# Patient Record
Sex: FEMALE | Race: WHITE | NOT HISPANIC OR LATINO | Employment: UNEMPLOYED | URBAN - METROPOLITAN AREA
[De-identification: names, ages, dates, MRNs, and addresses within clinical notes are randomized per-mention and may not be internally consistent; named-entity substitution may affect disease eponyms.]

---

## 2018-04-12 ENCOUNTER — CONVERSION ENCOUNTER (OUTPATIENT)
Dept: SURGERY | Facility: CLINIC | Age: 23
End: 2018-04-12

## 2018-04-12 ENCOUNTER — OFFICE VISIT CONVERTED (OUTPATIENT)
Dept: SURGERY | Facility: CLINIC | Age: 23
End: 2018-04-12
Attending: SURGERY

## 2019-05-15 ENCOUNTER — HOSPITAL ENCOUNTER (OUTPATIENT)
Dept: URGENT CARE | Facility: CLINIC | Age: 24
Discharge: HOME | End: 2019-05-15
Attending: NURSE PRACTITIONER

## 2019-05-18 LAB — BACTERIA SPEC AEROBE CULT: NORMAL

## 2020-06-01 ENCOUNTER — HOSPITAL ENCOUNTER (OUTPATIENT)
Dept: OTHER | Facility: HOSPITAL | Age: 25
Discharge: HOME | End: 2020-06-01
Attending: FAMILY MEDICINE

## 2020-06-02 LAB
VZV IGG SER IA-ACNC: 602 INDEX
VZV IGM SER IA-ACNC: <0.91 INDEX (ref 0–0.9)

## 2021-01-18 ENCOUNTER — HOSPITAL ENCOUNTER (OUTPATIENT)
Dept: OTHER | Facility: HOSPITAL | Age: 26
Discharge: HOME | End: 2021-01-18
Attending: INTERNAL MEDICINE

## 2021-02-12 ENCOUNTER — HOSPITAL ENCOUNTER (OUTPATIENT)
Dept: VACCINE CLINIC | Facility: HOSPITAL | Age: 26
Discharge: HOME | End: 2021-02-12
Attending: INTERNAL MEDICINE

## 2021-05-16 VITALS — WEIGHT: 158.5 LBS | RESPIRATION RATE: 14 BRPM | HEIGHT: 64 IN | BODY MASS INDEX: 27.06 KG/M2

## 2022-03-14 ENCOUNTER — OFFICE VISIT (OUTPATIENT)
Dept: OBSTETRICS AND GYNECOLOGY | Facility: CLINIC | Age: 27
End: 2022-03-14

## 2022-03-14 VITALS
HEART RATE: 76 BPM | BODY MASS INDEX: 36.8 KG/M2 | WEIGHT: 200 LBS | HEIGHT: 62 IN | SYSTOLIC BLOOD PRESSURE: 136 MMHG | DIASTOLIC BLOOD PRESSURE: 80 MMHG

## 2022-03-14 DIAGNOSIS — R10.2 PELVIC PAIN IN FEMALE: Primary | ICD-10-CM

## 2022-03-14 PROCEDURE — 99213 OFFICE O/P EST LOW 20 MIN: CPT | Performed by: OBSTETRICS & GYNECOLOGY

## 2022-03-14 PROCEDURE — G0123 SCREEN CERV/VAG THIN LAYER: HCPCS | Performed by: OBSTETRICS & GYNECOLOGY

## 2022-03-14 NOTE — PROGRESS NOTES
"GYN Problem/Follow Up Visit    Chief Complaint   Patient presents with   • Discuss IUD removel           HPI  Sherri Clifford is a 27 y.o. female, No obstetric history on file., who presents for pelvic pain. States had mirena inserted about four years ago and has had some pain/cramping since then but it has worsened over the past year. C/o bad cramps in mid pelvis. Has irregular menses with mirena. Denies vaginal sx. Also c/o weight gain. Considering iud removal but getting  in October and unsure if she should wait to remove it.        Additional OB/GYN History   No LMP recorded. Patient has had an implant.  Current contraception: contraceptive methods: IUD.  Insertion date: 4 years ago  Allergies : Patient has no known allergies.     The additional following portions of the patient's history were reviewed and updated as appropriate: allergies, current medications, past family history, past medical history, past social history, past surgical history and problem list.    Review of Systems    I have reviewed and agree with the HPI, ROS, and historical information as entered above. Wendy Nelson, APRN    Objective   /80   Pulse 76   Ht 157.5 cm (62\")   Wt 90.7 kg (200 lb)   BMI 36.58 kg/m²     Physical Exam  Vitals reviewed.   Genitourinary:     General: Normal vulva.      Vagina: Normal.      Cervix: Normal.      Uterus: Normal. Not tender.       Adnexa: Right adnexa normal and left adnexa normal.        Right: No tenderness.          Left: No tenderness.     Skin:     General: Skin is warm and dry.   Neurological:      Mental Status: She is alert and oriented to person, place, and time.            Assessment and Plan    Diagnoses and all orders for this visit:    1. Pelvic pain in female (Primary)  -     US Pelvis Transvaginal Non OB; Future  -     IGP,rfx Aptima HPV All Pth    will check pap and pelvic u/s. Discussed checking hormone levels but we will see what u/s shows first. Declines iud " removal today. Will consider options and f/u after u/s.     Counseling:  She understands the importance of having the above orders performed in a timely fashion.  She is encouraged to review her results online and/or contact or office if she has questions.     Follow Up:  Return for u/s f/u.      Wendy Nelson, KECIA  03/14/2022

## 2022-03-18 LAB
CONV .: NORMAL
CYTOLOGIST CVX/VAG CYTO: NORMAL
CYTOLOGY CVX/VAG DOC CYTO: NORMAL
CYTOLOGY CVX/VAG DOC THIN PREP: NORMAL
DX ICD CODE: NORMAL
HIV 1 & 2 AB SER-IMP: NORMAL
OTHER STN SPEC: NORMAL
STAT OF ADQ CVX/VAG CYTO-IMP: NORMAL

## 2022-03-29 ENCOUNTER — HOSPITAL ENCOUNTER (OUTPATIENT)
Dept: ULTRASOUND IMAGING | Facility: HOSPITAL | Age: 27
Discharge: HOME | End: 2022-03-29
Admitting: OBSTETRICS & GYNECOLOGY

## 2022-03-29 DIAGNOSIS — R10.2 PELVIC PAIN IN FEMALE: ICD-10-CM

## 2022-03-29 PROCEDURE — 76856 US EXAM PELVIC COMPLETE: CPT

## 2022-03-29 PROCEDURE — 76830 TRANSVAGINAL US NON-OB: CPT

## 2022-04-04 ENCOUNTER — OFFICE VISIT (OUTPATIENT)
Dept: OBSTETRICS AND GYNECOLOGY | Facility: CLINIC | Age: 27
End: 2022-04-04

## 2022-04-04 VITALS
WEIGHT: 206 LBS | HEART RATE: 82 BPM | BODY MASS INDEX: 37.68 KG/M2 | SYSTOLIC BLOOD PRESSURE: 140 MMHG | DIASTOLIC BLOOD PRESSURE: 84 MMHG

## 2022-04-04 DIAGNOSIS — R10.2 PELVIC PAIN IN FEMALE: Primary | ICD-10-CM

## 2022-04-04 DIAGNOSIS — N83.202 LEFT OVARIAN CYST: ICD-10-CM

## 2022-04-04 PROCEDURE — 99213 OFFICE O/P EST LOW 20 MIN: CPT | Performed by: OBSTETRICS & GYNECOLOGY

## 2022-04-04 NOTE — PROGRESS NOTES
GYN Problem/Follow Up Visit    Chief Complaint   Patient presents with   • Follow-up     U/s           HPI  Sherri Cilfford is a 27 y.o. female, No obstetric history on file., who presents for u/s f/u. Seen in office 3/14/22 with c/o pelvic pain. Has had mirena for four years. irreg menses.        Additional OB/GYN History   Patient's last menstrual period was 04/04/2022.  Current contraception: contraceptive methods: IUD.  Insertion date: 4 years ago  Desires to: continue contraception  Allergies : Patient has no known allergies.     The additional following portions of the patient's history were reviewed and updated as appropriate: allergies, current medications, past family history, past medical history, past social history, past surgical history and problem list.    Review of Systems    I have reviewed and agree with the HPI, ROS, and historical information as entered above. Wendy Nelson, APRN    Objective   /84   Pulse 82   Wt 93.4 kg (206 lb)   LMP 04/04/2022   BMI 37.68 kg/m²     Physical Exam  Vitals reviewed.   Neurological:      Mental Status: She is alert and oriented to person, place, and time.            Assessment and Plan    Diagnoses and all orders for this visit:    1. Pelvic pain in female (Primary)  -     US Pelvis Transvaginal Non OB; Future    2. Left ovarian cyst  -     US Pelvis Transvaginal Non OB; Future    reviewed pap done 3/14/22: normal. Reviewed pelvic u/s done 3/29/22: stripe 5 mm, iud in place, 2.3 cm possible hemorrhagic left ovarian cyst. Discussed options such as removing mirena to see if that helps her pain. Pt states has always had painful menses and declines iud removal today. Getting  in October. Will monitor sx. Will recheck pelvic u/s in two months.     Counseling:  She understands the importance of having the above orders performed in a timely fashion.  She is encouraged to review her results online and/or contact or office if she has questions.      Follow Up:  Return for u/s f/u-phone appt okay.      Wendy Nelson, APRN  04/04/2022

## 2022-04-26 ENCOUNTER — TELEPHONE (OUTPATIENT)
Dept: OBSTETRICS AND GYNECOLOGY | Facility: CLINIC | Age: 27
End: 2022-04-26

## 2022-04-26 NOTE — TELEPHONE ENCOUNTER
Please unable to repeat the ultrasound at this time due to the cost. She is requesting to speak with you.

## 2022-04-26 NOTE — TELEPHONE ENCOUNTER
----- Message from Sherri Clifford sent at 4/26/2022 12:32 PM EDT -----  Regarding: Ovarian Cyst   I would like to cancel my upcoming ultrasound appointment due to the high bill that follows. I would like to meet with you or a doctor to discuss surgery and remove the lesion completely as this will most likely be my decision in the end regardless. Please give me a call at your earliest convenience.   Thank you   Sherri

## 2022-06-03 ENCOUNTER — OFFICE VISIT (OUTPATIENT)
Dept: OBSTETRICS AND GYNECOLOGY | Facility: CLINIC | Age: 27
End: 2022-06-03

## 2022-06-03 VITALS
HEIGHT: 62 IN | DIASTOLIC BLOOD PRESSURE: 92 MMHG | BODY MASS INDEX: 36.44 KG/M2 | WEIGHT: 198 LBS | SYSTOLIC BLOOD PRESSURE: 141 MMHG | HEART RATE: 86 BPM

## 2022-06-03 DIAGNOSIS — F41.9 ANXIETY: ICD-10-CM

## 2022-06-03 DIAGNOSIS — N83.202 LEFT OVARIAN CYST: Primary | ICD-10-CM

## 2022-06-03 PROBLEM — N83.209 OVARIAN CYST: Status: ACTIVE | Noted: 2022-06-03

## 2022-06-03 PROCEDURE — 99213 OFFICE O/P EST LOW 20 MIN: CPT | Performed by: STUDENT IN AN ORGANIZED HEALTH CARE EDUCATION/TRAINING PROGRAM

## 2022-06-03 RX ORDER — LEVONORGESTREL 52 MG/1
1 INTRAUTERINE DEVICE INTRAUTERINE
COMMUNITY
End: 2022-11-28

## 2022-06-03 NOTE — PROGRESS NOTES
GYN Problem/Follow Up Visit    Chief Complaint   Patient presents with   • Follow-up     FU from  for Plv Pain and Ovarian Cyst            HPI  Sherri Clifford is a 27 y.o. female, , who presents for ovarian cyst. Feeling crampy and diana. Had an IUD placed four years ago. Reports that periods have been sporadic and irregular. 5-6 day period and then she can have spotting after a week or so afterwards. This has been long this with the IUD. She is a nonsmoker. She uses tylenol/ibuprofen and heating pad to help with discomfort. She reports that it usually does not help. Denies any pain with urination. She reports irregular bowel movements since she had her GB taken out. She has not seen a GI for this discomfort. She has not had any other abdominal surgeries. She has never been pregnant. No vaginal discharge, no odor. She is sexually active no pain with intercourse.  She does have some discomfort with bowel movements.  She reports that she is not sleeping very well, having difficulty with concentration, still finds shiv in life.  No desire to hurt self or hurt others.  Had been on Zoloft in the past when she was 18 has not seen by for this in a while.  Her primary care physician has retired.  She does not have a primary care physician.  She is in the process of planning a wedding    Additional OB/GYN History   No LMP recorded. Patient has had an implant.  Current contraception: contraceptive methods: IUD.  Insertion date:   Desires to: continue contraception  Allergies : Patient has no known allergies.     The additional following portions of the patient's history were reviewed and updated as appropriate: allergies, current medications, past family history, past medical history, past social history, past surgical history and problem list.    Review of Systems   Constitutional: Positive for fatigue and unexpected weight gain. Negative for fever.   Respiratory: Negative for cough and shortness of breath.   "  Cardiovascular: Negative for chest pain.   Gastrointestinal: Negative for abdominal distention and abdominal pain.   Genitourinary: Positive for menstrual problem and pelvic pain. Negative for decreased urine volume, difficulty urinating, dyspareunia, dysuria, urgency, urinary incontinence, vaginal discharge and vaginal pain.   Psychiatric/Behavioral: Positive for behavioral problems, decreased concentration and depressed mood. The patient is nervous/anxious.        I have reviewed and agree with the HPI, ROS, and historical information as entered above. Adonis Silva MD    Objective   /92   Pulse 86   Ht 157.5 cm (62\")   Wt 89.8 kg (198 lb)   Breastfeeding No   BMI 36.21 kg/m²     Physical Exam  Vitals and nursing note reviewed.   Constitutional:       General: She is not in acute distress.     Appearance: Normal appearance. She is not toxic-appearing.   HENT:      Head: Normocephalic and atraumatic.   Eyes:      Extraocular Movements: Extraocular movements intact.      Conjunctiva/sclera: Conjunctivae normal.   Cardiovascular:      Pulses: Normal pulses.   Pulmonary:      Effort: Pulmonary effort is normal.   Abdominal:      General: There is no distension.      Palpations: Abdomen is soft.      Tenderness: There is no abdominal tenderness.   Musculoskeletal:      Cervical back: Normal range of motion.   Skin:     General: Skin is warm and dry.   Neurological:      Mental Status: She is alert and oriented to person, place, and time.   Psychiatric:         Mood and Affect: Mood normal.         Behavior: Behavior normal.         Thought Content: Thought content normal.            Assessment and Plan  Sherri Clifford is a 27 y.o.  presenting for follow-up for ongoing pelvic discomforts.  Review of transvaginal ultrasound performed in March showing normal-appearing uterus with IUD in place.  There appeared to be a small hemorrhagic cyst on the left side.  Right ovary appears normal.  " Discussed with patient possibility that this began a small endometrioma versus a hemorrhagic cyst with recommendation for follow-up ultrasound 6 to 8 weeks following that previous scan.  Also discussed stress is a cause of abnormalities in menses regulation.  Recommendation for evaluation by primary care physician for possible needs of anxiolytic, depressive medication.  Patient also with elevated blood pressures noted since July of last year likely needs to be on an agent.    Diagnoses and all orders for this visit:    1. Left ovarian cyst (Primary)  -     US Non-ob Transvaginal; Future    2. Anxiety  -     Ambulatory Referral to Family Practice          She understands the importance of having the above orders performed in a timely fashion.  The risks of not performing them include, but are not limited to, cancer and/or subsequent increase in morbidity and/or mortality.  She is encouraged to review her results online and/or contact or office if she has questions.     Follow Up:  Return in about 6 weeks (around 7/15/2022) for Next scheduled follow up.        Adonis Silva MD  06/03/2022

## 2022-06-06 ENCOUNTER — APPOINTMENT (OUTPATIENT)
Dept: ULTRASOUND IMAGING | Facility: HOSPITAL | Age: 27
End: 2022-06-06

## 2022-06-14 NOTE — PROGRESS NOTES
"CHIEF COMPLAINT  Sherri Clifford presents to [facility] to Establish Care (Pt would like to establish care ) and Hypertension     HPI  Pt is here to establish care-concerned about BP-dx-2-3 yrs ago.Occ HA-1x/week-for yrs-FH DM    Broke her toe 3 days ago -after knocked down by her black lab-    HTN--not controlled-no meds-lost 10 lbs past 2 weeks  Getting  in 10/2022    Past History:  Allergies: Patient has no known allergies.   Medical History: has a past medical history of Fracture of foot and Hypertension.   Surgical History: has a past surgical history that includes Cholecystectomy.   Family History: family history includes Breast cancer (age of onset: 38) in her paternal aunt; Diabetes in her maternal grandmother; Heart disease in her paternal grandmother; Stroke in her maternal grandfather and paternal grandmother.   Social History: reports that she has never smoked. She has never used smokeless tobacco. She reports current alcohol use of about 2.0 standard drinks of alcohol per week. She reports that she does not use drugs.  Social History     Social History Narrative   • Not on file     SH-works at [employer]-drinks alcohol socially, no cigarette use    OBJECTIVE  Vital Signs  Vitals:    06/21/22 1408 06/21/22 1430   BP: 135/93 138/98   BP Location: Right arm Left arm   Patient Position: Sitting Sitting   Cuff Size: Adult Adult   Pulse: 77    Resp: 12    Temp: 97.2 °F (36.2 °C)    SpO2: 98%    Weight: 88.9 kg (196 lb)    Height: 160 cm (63\")       Body mass index is 34.72 kg/m².    Review of Systems -significant for occasional headaches, left great toe fracture    Physical Exam  Vitals and nursing note reviewed.   Constitutional:       Appearance: Normal appearance.   HENT:      Head: Normocephalic and atraumatic.   Cardiovascular:      Rate and Rhythm: Normal rate and regular rhythm.   Pulmonary:      Effort: Pulmonary effort is normal.      Breath sounds: " Normal breath sounds.   Abdominal:      Palpations: Abdomen is soft.   Musculoskeletal:      Cervical back: Normal range of motion and neck supple.   Neurological:      General: No focal deficit present.      Mental Status: She is alert and oriented to person, place, and time.         RESULTS REVIEW  No results found for: PROBNP, BNP          No results found for: TSH   No results found for: FREET4         US Pelvis Transvaginal Non OB    Result Date: 3/29/2022    1. 2.3 centimeter cystic lesion in the left ovary favored to reflect small hemorrhagic cyst.  Endometrioma or other process less likely.  Follow-up could be considered in 12 weeks to ensure resolution. 2. Small amount of fluid adjacent to the right ovary may be physiologic free fluid. 3. IUD in expected position.  Normal endometrial thickness.     ESTELITA MCGUIRE MD       Electronically Signed and Approved By: ESTELITA MCGUIRE MD on 3/29/2022 at 14:40                         ASSESSMENT & PLAN  Diagnoses and all orders for this visit:    1. Primary hypertension (Primary)  Assessment & Plan:  Not controlled discussed hypertension and complications.  Start amlodipine 2.5 mg daily monitor for any swelling of the legs.  Information regarding the DASH diet and hypertension given to patient.  Monitor blood pressure twice a day 5 days a week at home and record bring in log at next visit.    Orders:  -     Vitamin B12; Future  -     Folate; Future  -     CBC w AUTO Differential; Future  -     Comprehensive metabolic panel; Future  -     Lipid panel; Future  -     TSH+Free T4; Future  -     Vitamin D 25 hydroxy; Future  -     Hepatitis C antibody; Future  -     amLODIPine (NORVASC) 2.5 MG tablet; Take 1 tablet by mouth Daily.  Dispense: 30 tablet; Refill: 2    2. Closed nondisplaced fracture of phalanx of left great toe, unspecified phalanx, initial encounter  Comments:  just saw Dr Hendricks-wearing boot--f/u with him as directed 4-6 weeks      Patient  Instructions   Start amlodipine 2.5 mg daily  Do labs  Information on Mediterranean diet and DASH diet given to patient and hypertension management.     FOLLOW UP  Return in about 2 months (around 8/21/2022).     Addendum 6/29/2022  Vitamin D level low start high-dose vitamin D for 2 months then vitamin D 2000 units daily      Patient was given instructions and counseling regarding her condition or for health maintenance advice. Please see specific information pulled into the AVS if appropriate.

## 2022-06-20 ENCOUNTER — HOSPITAL ENCOUNTER (EMERGENCY)
Facility: HOSPITAL | Age: 27
Discharge: HOME | End: 2022-06-20
Attending: EMERGENCY MEDICINE | Admitting: EMERGENCY MEDICINE

## 2022-06-20 ENCOUNTER — APPOINTMENT (OUTPATIENT)
Dept: GENERAL RADIOLOGY | Facility: HOSPITAL | Age: 27
End: 2022-06-20

## 2022-06-20 VITALS
OXYGEN SATURATION: 100 % | DIASTOLIC BLOOD PRESSURE: 82 MMHG | HEIGHT: 63 IN | SYSTOLIC BLOOD PRESSURE: 149 MMHG | RESPIRATION RATE: 16 BRPM | HEART RATE: 92 BPM | TEMPERATURE: 98.2 F | WEIGHT: 196.87 LBS | BODY MASS INDEX: 34.88 KG/M2

## 2022-06-20 DIAGNOSIS — S92.415A CLOSED NONDISPLACED FRACTURE OF PROXIMAL PHALANX OF LEFT GREAT TOE, INITIAL ENCOUNTER: Primary | ICD-10-CM

## 2022-06-20 PROCEDURE — 73630 X-RAY EXAM OF FOOT: CPT

## 2022-06-20 PROCEDURE — 99283 EMERGENCY DEPT VISIT LOW MDM: CPT

## 2022-06-20 PROCEDURE — 96372 THER/PROPH/DIAG INJ SC/IM: CPT

## 2022-06-20 PROCEDURE — 25010000002 KETOROLAC TROMETHAMINE PER 15 MG: Performed by: NURSE PRACTITIONER

## 2022-06-20 RX ORDER — KETOROLAC TROMETHAMINE 30 MG/ML
30 INJECTION, SOLUTION INTRAMUSCULAR; INTRAVENOUS ONCE
Status: COMPLETED | OUTPATIENT
Start: 2022-06-20 | End: 2022-06-20

## 2022-06-20 RX ORDER — KETOROLAC TROMETHAMINE 10 MG/1
10 TABLET, FILM COATED ORAL EVERY 6 HOURS PRN
Qty: 20 TABLET | Refills: 0 | OUTPATIENT
Start: 2022-06-20 | End: 2022-08-14

## 2022-06-20 RX ADMIN — KETOROLAC TROMETHAMINE 30 MG: 30 INJECTION, SOLUTION INTRAMUSCULAR; INTRAVENOUS at 15:27

## 2022-06-20 NOTE — ED PROVIDER NOTES
Subjective   Patient presents to the emergency department today due to pain, bruising, swelling of her left great toe.  She says that her dog tripped her last night causing her to injure her toe.  He also reports that she has had an injury to that toe in the past.      History provided by:  Patient   used: No        Review of Systems   Constitutional: Negative for chills and fever.   HENT: Negative for congestion, ear pain, rhinorrhea and sore throat.    Eyes: Negative for pain.   Respiratory: Negative for cough and shortness of breath.    Cardiovascular: Negative for chest pain.   Gastrointestinal: Negative for abdominal pain, diarrhea, nausea and vomiting.   Genitourinary: Negative for decreased urine volume, dysuria and flank pain.   Musculoskeletal: Positive for arthralgias (Left great toe), gait problem and joint swelling (Left great toe). Negative for myalgias.   Skin: Negative for rash.   Neurological: Negative for seizures and headaches.   All other systems reviewed and are negative.      Past Medical History:   Diagnosis Date   • Fracture of foot     left foot   • Hypertension        No Known Allergies    Past Surgical History:   Procedure Laterality Date   • CHOLECYSTECTOMY         Family History   Problem Relation Age of Onset   • Breast cancer Paternal Aunt 38   • Diabetes Maternal Grandmother    • Stroke Maternal Grandfather    • Heart disease Paternal Grandmother    • Stroke Paternal Grandmother        Social History     Socioeconomic History   • Marital status: Single   Tobacco Use   • Smoking status: Never Smoker   • Smokeless tobacco: Never Used   Vaping Use   • Vaping Use: Never used   Substance and Sexual Activity   • Alcohol use: Yes     Alcohol/week: 2.0 standard drinks     Types: 2 Standard drinks or equivalent per week   • Drug use: Never   • Sexual activity: Yes     Partners: Male     Birth control/protection: I.U.D.           Objective   Physical Exam  Vitals and nursing  note reviewed.   Constitutional:       General: She is not in acute distress.     Appearance: Normal appearance. She is normal weight. She is not ill-appearing, toxic-appearing or diaphoretic.   HENT:      Head: Normocephalic and atraumatic.      Right Ear: External ear normal.      Left Ear: External ear normal.   Eyes:      General: No scleral icterus.     Conjunctiva/sclera: Conjunctivae normal.      Pupils: Pupils are equal, round, and reactive to light.   Cardiovascular:      Rate and Rhythm: Normal rate.   Pulmonary:      Effort: Pulmonary effort is normal. No respiratory distress.   Abdominal:      General: There is no distension.      Tenderness: There is no abdominal tenderness.   Musculoskeletal:         General: Swelling, tenderness and signs of injury present. No deformity.      Cervical back: Normal range of motion and neck supple.      Comments: Left great toe swelling and bruising with limited range of motion due to pain   Skin:     General: Skin is warm and dry.      Capillary Refill: Capillary refill takes less than 2 seconds.      Findings: Bruising present.   Neurological:      General: No focal deficit present.      Mental Status: She is alert and oriented to person, place, and time.   Psychiatric:         Mood and Affect: Mood normal.         Behavior: Behavior normal.         Procedures           ED Course                                                 MDM  Number of Diagnoses or Management Options  Closed nondisplaced fracture of proximal phalanx of left great toe, initial encounter: new and requires workup     Amount and/or Complexity of Data Reviewed  Tests in the radiology section of CPT®: reviewed    Patient Progress  Patient progress: stable      Final diagnoses:   Closed nondisplaced fracture of proximal phalanx of left great toe, initial encounter       ED Disposition  ED Disposition     ED Disposition   Discharge    Condition   Stable    Comment   --             Henri Marquez,  MD  [address] [id number]  [phone]    Schedule an appointment as soon as possible for a visit       Coy Ramirez MD  [address] [facility] [address]  [place] [address]  [phone]      As needed         Medication List      New Prescriptions    ketorolac 10 MG tablet  Commonly known as: TORADOL  Take 1 tablet by mouth Every 6 (Six) Hours As Needed for Moderate Pain .           Where to Get Your Medications      These medications were sent to [address] [facility] AT [facility] [address] & [address] - [phone]  - [phone] [place] [id number]    Phone: [phone]   · ketorolac 10 MG tablet          Sheryl Pimentel, APRN  06/22/22 0067

## 2022-06-21 ENCOUNTER — OFFICE VISIT (OUTPATIENT)
Dept: INTERNAL MEDICINE | Facility: CLINIC | Age: 27
End: 2022-06-21

## 2022-06-21 ENCOUNTER — OFFICE VISIT (OUTPATIENT)
Dept: PODIATRY | Facility: CLINIC | Age: 27
End: 2022-06-21

## 2022-06-21 VITALS
SYSTOLIC BLOOD PRESSURE: 138 MMHG | TEMPERATURE: 97.2 F | WEIGHT: 196 LBS | RESPIRATION RATE: 12 BRPM | HEART RATE: 77 BPM | BODY MASS INDEX: 34.73 KG/M2 | DIASTOLIC BLOOD PRESSURE: 98 MMHG | HEIGHT: 63 IN | OXYGEN SATURATION: 98 %

## 2022-06-21 VITALS
HEIGHT: 63 IN | WEIGHT: 196 LBS | DIASTOLIC BLOOD PRESSURE: 92 MMHG | OXYGEN SATURATION: 100 % | SYSTOLIC BLOOD PRESSURE: 143 MMHG | HEART RATE: 78 BPM | BODY MASS INDEX: 34.73 KG/M2 | TEMPERATURE: 97.6 F

## 2022-06-21 DIAGNOSIS — E55.9 VITAMIN D DEFICIENCY: ICD-10-CM

## 2022-06-21 DIAGNOSIS — S92.415A CLOSED NONDISPLACED FRACTURE OF PROXIMAL PHALANX OF LEFT GREAT TOE, INITIAL ENCOUNTER: ICD-10-CM

## 2022-06-21 DIAGNOSIS — M79.672 FOOT PAIN, LEFT: Primary | ICD-10-CM

## 2022-06-21 DIAGNOSIS — I10 PRIMARY HYPERTENSION: Primary | ICD-10-CM

## 2022-06-21 DIAGNOSIS — S92.405A CLOSED NONDISPLACED FRACTURE OF PHALANX OF LEFT GREAT TOE, UNSPECIFIED PHALANX, INITIAL ENCOUNTER: ICD-10-CM

## 2022-06-21 PROBLEM — N20.0 KIDNEY STONES: Status: ACTIVE | Noted: 2022-06-21

## 2022-06-21 PROCEDURE — 99203 OFFICE O/P NEW LOW 30 MIN: CPT | Performed by: INTERNAL MEDICINE

## 2022-06-21 PROCEDURE — 99203 OFFICE O/P NEW LOW 30 MIN: CPT | Performed by: PODIATRIST

## 2022-06-21 RX ORDER — AMLODIPINE BESYLATE 2.5 MG/1
2.5 TABLET ORAL DAILY
Qty: 30 TABLET | Refills: 2 | Status: SHIPPED | OUTPATIENT
Start: 2022-06-21 | End: 2022-08-17

## 2022-06-21 NOTE — ASSESSMENT & PLAN NOTE
Not controlled discussed hypertension and complications.  Start amlodipine 2.5 mg daily monitor for any swelling of the legs.  Information regarding the DASH diet and hypertension given to patient.  Monitor blood pressure twice a day 5 days a week at home and record bring in log at next visit.

## 2022-06-21 NOTE — PATIENT INSTRUCTIONS
Start amlodipine 2.5 mg daily  Do labs  Information on Mediterranean diet and DASH diet given to patient and hypertension management.

## 2022-06-21 NOTE — PROGRESS NOTES
[facility] - PODIATRY    Today's Date: 06/21/22    Patient Name: Sherri Clifford  MRN: [id number]  [facility]: [id number]  PCP: Coy Ramirez MD  Referring Provider: Referring, Self    SUBJECTIVE     Chief Complaint   Patient presents with   • Left Foot - Establish Care, Fracture, Toe Pain     Fell down after tripping over dog      HPI: Sherri Clifford, a 27 y.o.female, presents to clinic.    New, Established, New Problem:  new    Location:  Left hallux    Duration:  6/19/2022     Onset: Acute, tripped over her dog    Nature: Sore, achy    Stable, worsening, improving: Stable    Aggravating factors:  Patient relates pain is aggravated by shoe gear and ambulation.      Previous Treatment: Urgent visit, x-ray, surgical shoe    Patient denies any fevers, chills, nausea, vomiting, shortness of breath, nor any other constitutional signs nor symptoms.    No other pedal complaints at this time.    Past Medical History:   Diagnosis Date   • Fracture of foot     left foot   • Hypertension      Past Surgical History:   Procedure Laterality Date   • CHOLECYSTECTOMY       Family History   Problem Relation Age of Onset   • Breast cancer Paternal Aunt 38   • Diabetes Maternal Grandmother    • Stroke Maternal Grandfather    • Heart disease Paternal Grandmother    • Stroke Paternal Grandmother      Social History     Socioeconomic History   • Marital status: Single   Tobacco Use   • Smoking status: Never Smoker   • Smokeless tobacco: Never Used   Vaping Use   • Vaping Use: Never used   Substance and Sexual Activity   • Alcohol use: Yes     Alcohol/week: 2.0 standard drinks     Types: 2 Standard drinks or equivalent per week   • Drug use: Never   • Sexual activity: Yes     Partners: Male     Birth control/protection: I.U.D.     No Known Allergies  Current Outpatient Medications   Medication Sig Dispense Refill   • ketorolac (TORADOL) 10 MG tablet Take 1 tablet by mouth Every 6 (Six) Hours As Needed for  Moderate Pain . 20 tablet 0   • Levonorgestrel (Mirena, 52 MG,) 20 MCG/DAY intrauterine device IUD 1 each by Intrauterine route.       No current facility-administered medications for this visit.     Review of Systems   Constitutional: Negative.    Musculoskeletal:        Fracture left great toe   All other systems reviewed and are negative.      OBJECTIVE     Vitals:    06/21/22 1258   BP: 143/92   Pulse: 78   Temp: 97.6 °F (36.4 °C)   SpO2: 100%       No results found for: WBC, RBC, HGB, HCT, MCV, MCH, MCHC, RDW, RDWSD, MPV, PLT, NEUTRORELPCT, LYMPHORELPCT, MONORELPCT, EOSRELPCT, BASORELPCT, AUTOIGPER, NEUTROABS, LYMPHSABS, MONOSABS, EOSABS, BASOSABS, AUTOIGNUM, NRBC      No results found for: GLUCOSE, BUN, CREATININE, EGFRIFNONA, EGFRIFAFRI, BCR, K, CO2, CALCIUM, PROTENTOTREF, ALBUMIN, LABIL2, BILIRUBIN, AST, ALT    Patient seen in no apparent distress.      PHYSICAL EXAM:     Foot/Ankle Exam:       General:   Appearance: appears stated age and healthy    Orientation: AAOx3    Affect: appropriate    Gait: unimpaired    Shoes: Surgical shoe on left foot.    VASCULAR      Right Foot Vascularity   Normal vascular exam    Dorsalis pedis:  2+  Posterior tibial:  2+  Skin Temperature: warm    Edema Grading:  None  CFT:  < 3 seconds  Pedal Hair Growth:  Present  Varicosities: none       Left Foot Vascularity   Normal vascular exam    Dorsalis pedis:  2+  Posterior tibial:  2+  Skin Temperature: warm    Edema Grading:  None  CFT:  < 3 seconds  Pedal Hair Growth:  Present  Varicosities: none        NEUROLOGIC     Right Foot Neurologic   Normal sensation    Light touch sensation:  Normal  Vibratory sensation:  Normal  Hot/Cold sensation: normal    Protective Sensation using [place] Monofilament:  10     Left Foot Neurologic   Normal sensation    Light touch sensation:  Normal  Vibratory sensation:  Normal  Hot/cold sensation: normal    Protective Sensation using [place] Monofilament:  10      MUSCULOSKELETAL      Left Foot Musculoskeletal   Ecchymosis:  Toe 1  Tenderness: toe 1       MUSCLE STRENGTH     Right Foot Muscle Strength   Foot dorsiflexion:  4  Foot plantar flexion:  4  Foot inversion:  4  Foot eversion:  4     Left Foot Muscle Strength   Foot dorsiflexion:  4  Foot plantar flexion:  4  Foot inversion:  4  Foot eversion:  4     RANGE OF MOTION      Right Foot Range of Motion   Foot and ankle ROM within normal limits       Left Foot Range of Motion   Foot and ankle ROM within normal limits       DERMATOLOGIC     Right Foot Dermatologic   Skin: skin intact    Nails: normal       Left Foot Dermatologic   Skin: skin intact    Nails: normal        RADIOLOGY:      XR Foot 3+ View Left    Result Date: 6/20/2022  Narrative: PROCEDURE: XR FOOT 3+ VW LEFT  COMPARISON: None  INDICATIONS: LEFT BIG TOE PAIN/BRUISING AFTER FALL  FINDINGS:  There is a fracture 1st proximal phalanx with extension to the distal articular surface.  Alignment is near anatomic.  First digit soft tissue swelling is noted.  No other fracture is identified.  No tibiotalar or subtalar joint effusion is noted.      Impression:   1. Nondisplaced intra-articular fracture of the 1st proximal phalanx      Jaspreet Walker M.D.       Electronically Signed and Approved By: Jaspreet Walker M.D. on 6/20/2022 at 14:35               ASSESSMENT/PLAN     Diagnoses and all orders for this visit:    1. Foot pain, left (Primary)    2. Closed nondisplaced fracture of proximal phalanx of left great toe, initial encounter        Comprehensive lower extremity examination and evaluation was performed.    Discussed findings and treatment plan including risks, benefits, and treatment options with patient in detail. Patient agreed with treatment plan.    Medications and allergies reviewed.  Reviewed available lab values along with other pertinent labs.  These were discussed with the patient.    CAM walker applied to Left lower leg.  Patient instructed to utilize  for partial-weight bearing at all time with CAM walker.  Dr. Hendricks advised patient may resume driving, but advised not to drive while wearing an ambulatory device.  Advised quick/hard depression of brakes could cause injury to areas.  Also advised of possible legal implications while driving in restrictive device.  The patient states understanding and agreement with these instructions.    An After Visit Summary was printed and given to the patient at discharge, including (if requested) any available informative/educational handouts regarding diagnosis, treatment, or medications. All questions were answered to patient/family satisfaction. Should symptoms fail to improve or worsen they agree to call or return to clinic or to go to the Emergency Department. Discussed the importance of following up with any needed screening tests/labs/specialist appointments and any requested follow-up recommended by me today. Importance of maintaining follow-up discussed and patient accepts that missed appointments can delay diagnosis and potentially lead to worsening of conditions.    Return in about 3 weeks (around 7/12/2022) for X-ray needed., or sooner if acute issues arise.    This document has been electronically signed by Felton Hendricks DPM on June 21, 2022 13:26 EDT

## 2022-06-22 ENCOUNTER — PATIENT ROUNDING (BHMG ONLY) (OUTPATIENT)
Dept: PODIATRY | Facility: CLINIC | Age: 27
End: 2022-06-22

## 2022-06-22 NOTE — PROGRESS NOTES
A [place] message has been sent to the patient for PATIENT ROUNDING with [facility]

## 2022-06-23 ENCOUNTER — LAB (OUTPATIENT)
Dept: LAB | Facility: HOSPITAL | Age: 27
End: 2022-06-23

## 2022-06-23 DIAGNOSIS — I10 PRIMARY HYPERTENSION: ICD-10-CM

## 2022-06-23 LAB
25(OH)D3 SERPL-MCNC: 30.7 NG/ML (ref 30–100)
ALBUMIN SERPL-MCNC: 4.3 G/DL (ref 3.5–5.2)
ALBUMIN/GLOB SERPL: 1.6 G/DL
ALP SERPL-CCNC: 75 U/L (ref 39–117)
ALT SERPL W P-5'-P-CCNC: 23 U/L (ref 1–33)
ANION GAP SERPL CALCULATED.3IONS-SCNC: 8.1 MMOL/L (ref 5–15)
AST SERPL-CCNC: 21 U/L (ref 1–32)
BASOPHILS # BLD AUTO: 0.02 10*3/MM3 (ref 0–0.2)
BASOPHILS NFR BLD AUTO: 0.4 % (ref 0–1.5)
BILIRUB SERPL-MCNC: 0.4 MG/DL (ref 0–1.2)
BUN SERPL-MCNC: 16 MG/DL (ref 6–20)
BUN/CREAT SERPL: 16.5 (ref 7–25)
CALCIUM SPEC-SCNC: 9.4 MG/DL (ref 8.6–10.5)
CHLORIDE SERPL-SCNC: 104 MMOL/L (ref 98–107)
CHOLEST SERPL-MCNC: 137 MG/DL (ref 0–200)
CO2 SERPL-SCNC: 24.9 MMOL/L (ref 22–29)
CREAT SERPL-MCNC: 0.97 MG/DL (ref 0.57–1)
DEPRECATED RDW RBC AUTO: 41.2 FL (ref 37–54)
EGFRCR SERPLBLD CKD-EPI 2021: 81.8 ML/MIN/1.73
EOSINOPHIL # BLD AUTO: 0.07 10*3/MM3 (ref 0–0.4)
EOSINOPHIL NFR BLD AUTO: 1.4 % (ref 0.3–6.2)
ERYTHROCYTE [DISTWIDTH] IN BLOOD BY AUTOMATED COUNT: 12.1 % (ref 12.3–15.4)
FOLATE SERPL-MCNC: 6.97 NG/ML (ref 4.78–24.2)
GLOBULIN UR ELPH-MCNC: 2.7 GM/DL
GLUCOSE SERPL-MCNC: 89 MG/DL (ref 65–99)
HCT VFR BLD AUTO: 40.3 % (ref 34–46.6)
HCV AB SER DONR QL: NORMAL
HDLC SERPL-MCNC: 49 MG/DL (ref 40–60)
HGB BLD-MCNC: 13.3 G/DL (ref 12–15.9)
IMM GRANULOCYTES # BLD AUTO: 0.01 10*3/MM3 (ref 0–0.05)
IMM GRANULOCYTES NFR BLD AUTO: 0.2 % (ref 0–0.5)
LDLC SERPL CALC-MCNC: 78 MG/DL (ref 0–100)
LDLC/HDLC SERPL: 1.61 {RATIO}
LYMPHOCYTES # BLD AUTO: 1.29 10*3/MM3 (ref 0.7–3.1)
LYMPHOCYTES NFR BLD AUTO: 25.6 % (ref 19.6–45.3)
MCH RBC QN AUTO: 30.5 PG (ref 26.6–33)
MCHC RBC AUTO-ENTMCNC: 33 G/DL (ref 31.5–35.7)
MCV RBC AUTO: 92.4 FL (ref 79–97)
MONOCYTES # BLD AUTO: 0.27 10*3/MM3 (ref 0.1–0.9)
MONOCYTES NFR BLD AUTO: 5.4 % (ref 5–12)
NEUTROPHILS NFR BLD AUTO: 3.38 10*3/MM3 (ref 1.7–7)
NEUTROPHILS NFR BLD AUTO: 67 % (ref 42.7–76)
NRBC BLD AUTO-RTO: 0 /100 WBC (ref 0–0.2)
PLATELET # BLD AUTO: 224 10*3/MM3 (ref 140–450)
PMV BLD AUTO: 10 FL (ref 6–12)
POTASSIUM SERPL-SCNC: 4.3 MMOL/L (ref 3.5–5.2)
PROT SERPL-MCNC: 7 G/DL (ref 6–8.5)
RBC # BLD AUTO: 4.36 10*6/MM3 (ref 3.77–5.28)
SODIUM SERPL-SCNC: 137 MMOL/L (ref 136–145)
T4 FREE SERPL-MCNC: 1.18 NG/DL (ref 0.93–1.7)
TRIGL SERPL-MCNC: 45 MG/DL (ref 0–150)
TSH SERPL DL<=0.05 MIU/L-ACNC: 1.24 UIU/ML (ref 0.27–4.2)
VIT B12 BLD-MCNC: 488 PG/ML (ref 211–946)
VLDLC SERPL-MCNC: 10 MG/DL (ref 5–40)
WBC NRBC COR # BLD: 5.04 10*3/MM3 (ref 3.4–10.8)

## 2022-06-23 PROCEDURE — 82306 VITAMIN D 25 HYDROXY: CPT

## 2022-06-23 PROCEDURE — 36415 COLL VENOUS BLD VENIPUNCTURE: CPT

## 2022-06-23 PROCEDURE — 80061 LIPID PANEL: CPT

## 2022-06-23 PROCEDURE — 80050 GENERAL HEALTH PANEL: CPT

## 2022-06-23 PROCEDURE — 82607 VITAMIN B-12: CPT

## 2022-06-23 PROCEDURE — 86803 HEPATITIS C AB TEST: CPT

## 2022-06-23 PROCEDURE — 84439 ASSAY OF FREE THYROXINE: CPT

## 2022-06-23 PROCEDURE — 82746 ASSAY OF FOLIC ACID SERUM: CPT

## 2022-06-27 ENCOUNTER — PATIENT ROUNDING (BHMG ONLY) (OUTPATIENT)
Dept: INTERNAL MEDICINE | Facility: CLINIC | Age: 27
End: 2022-06-27

## 2022-06-27 NOTE — PROGRESS NOTES
June 27, 2022    Hello, may I speak with Sherri Clifford?    My name is Rio Waite     I am  with [facility] SHAYLEE RODRIGES  [facility]  [address] [address]    Before we get started may I verify your date of birth? 1995    I am calling to officially welcome you to our practice and ask about your recent visit. Is this a good time to talk? yes    Tell me about your visit with us. What things went well?  Patient says everything went fine        We're always looking for ways to make our patients' experiences even better. Do you have recommendations on ways we may improve?  no    Overall were you satisfied with your first visit to our practice? yes       I appreciate you taking the time to speak with me today. Is there anything else I can do for you? no      Thank you, and have a great day.

## 2022-06-29 ENCOUNTER — HOSPITAL ENCOUNTER (OUTPATIENT)
Dept: ULTRASOUND IMAGING | Facility: HOSPITAL | Age: 27
Discharge: HOME | End: 2022-06-29
Admitting: STUDENT IN AN ORGANIZED HEALTH CARE EDUCATION/TRAINING PROGRAM

## 2022-06-29 DIAGNOSIS — N83.202 LEFT OVARIAN CYST: ICD-10-CM

## 2022-06-29 PROCEDURE — 76830 TRANSVAGINAL US NON-OB: CPT

## 2022-06-29 RX ORDER — ERGOCALCIFEROL 1.25 MG/1
50000 CAPSULE ORAL WEEKLY
Qty: 9 CAPSULE | Refills: 0 | Status: ON HOLD | OUTPATIENT
Start: 2022-06-29 | End: 2022-08-21

## 2022-06-30 ENCOUNTER — TELEPHONE (OUTPATIENT)
Dept: OBSTETRICS AND GYNECOLOGY | Facility: CLINIC | Age: 27
End: 2022-06-30

## 2022-06-30 NOTE — TELEPHONE ENCOUNTER
----- Message from Adonis Silva MD sent at 6/30/2022  1:06 PM EDT -----  Normal transvaginal ultrasound

## 2022-08-14 ENCOUNTER — APPOINTMENT (OUTPATIENT)
Dept: CT IMAGING | Facility: HOSPITAL | Age: 27
End: 2022-08-14

## 2022-08-14 ENCOUNTER — HOSPITAL ENCOUNTER (EMERGENCY)
Facility: HOSPITAL | Age: 27
Discharge: HOME | End: 2022-08-14
Attending: EMERGENCY MEDICINE | Admitting: EMERGENCY MEDICINE

## 2022-08-14 VITALS
RESPIRATION RATE: 18 BRPM | SYSTOLIC BLOOD PRESSURE: 136 MMHG | OXYGEN SATURATION: 100 % | TEMPERATURE: 97.9 F | DIASTOLIC BLOOD PRESSURE: 77 MMHG | WEIGHT: 195.77 LBS | HEIGHT: 64 IN | HEART RATE: 91 BPM | BODY MASS INDEX: 33.42 KG/M2

## 2022-08-14 DIAGNOSIS — N20.1 URETEROLITHIASIS: Primary | ICD-10-CM

## 2022-08-14 LAB
ALBUMIN SERPL-MCNC: 4 G/DL (ref 3.5–5.2)
ALBUMIN/GLOB SERPL: 1.4 G/DL
ALP SERPL-CCNC: 71 U/L (ref 39–117)
ALT SERPL W P-5'-P-CCNC: 16 U/L (ref 1–33)
ANION GAP SERPL CALCULATED.3IONS-SCNC: 11.1 MMOL/L (ref 5–15)
AST SERPL-CCNC: 21 U/L (ref 1–32)
BACTERIA UR QL AUTO: ABNORMAL /HPF
BASOPHILS # BLD AUTO: 0.03 10*3/MM3 (ref 0–0.2)
BASOPHILS NFR BLD AUTO: 0.4 % (ref 0–1.5)
BILIRUB SERPL-MCNC: 0.4 MG/DL (ref 0–1.2)
BILIRUB UR QL STRIP: NEGATIVE
BUN SERPL-MCNC: 8 MG/DL (ref 6–20)
BUN/CREAT SERPL: 9.9 (ref 7–25)
CALCIUM SPEC-SCNC: 8.3 MG/DL (ref 8.6–10.5)
CHLORIDE SERPL-SCNC: 109 MMOL/L (ref 98–107)
CLARITY UR: CLEAR
CO2 SERPL-SCNC: 19.9 MMOL/L (ref 22–29)
COLOR UR: YELLOW
CREAT SERPL-MCNC: 0.81 MG/DL (ref 0.57–1)
DEPRECATED RDW RBC AUTO: 41 FL (ref 37–54)
EGFRCR SERPLBLD CKD-EPI 2021: 101.5 ML/MIN/1.73
EOSINOPHIL # BLD AUTO: 0.06 10*3/MM3 (ref 0–0.4)
EOSINOPHIL NFR BLD AUTO: 0.7 % (ref 0.3–6.2)
ERYTHROCYTE [DISTWIDTH] IN BLOOD BY AUTOMATED COUNT: 12.5 % (ref 12.3–15.4)
GLOBULIN UR ELPH-MCNC: 2.8 GM/DL
GLUCOSE SERPL-MCNC: 86 MG/DL (ref 65–99)
GLUCOSE UR STRIP-MCNC: NEGATIVE MG/DL
HCG INTACT+B SERPL-ACNC: <0.5 MIU/ML
HCT VFR BLD AUTO: 41.8 % (ref 34–46.6)
HGB BLD-MCNC: 14.3 G/DL (ref 12–15.9)
HGB UR QL STRIP.AUTO: NEGATIVE
HOLD SPECIMEN: NORMAL
HOLD SPECIMEN: NORMAL
HYALINE CASTS UR QL AUTO: ABNORMAL /LPF
IMM GRANULOCYTES # BLD AUTO: 0.02 10*3/MM3 (ref 0–0.05)
IMM GRANULOCYTES NFR BLD AUTO: 0.2 % (ref 0–0.5)
KETONES UR QL STRIP: NEGATIVE
LEUKOCYTE ESTERASE UR QL STRIP.AUTO: ABNORMAL
LIPASE SERPL-CCNC: 21 U/L (ref 13–60)
LYMPHOCYTES # BLD AUTO: 0.97 10*3/MM3 (ref 0.7–3.1)
LYMPHOCYTES NFR BLD AUTO: 11.9 % (ref 19.6–45.3)
MCH RBC QN AUTO: 31 PG (ref 26.6–33)
MCHC RBC AUTO-ENTMCNC: 34.2 G/DL (ref 31.5–35.7)
MCV RBC AUTO: 90.7 FL (ref 79–97)
MONOCYTES # BLD AUTO: 0.31 10*3/MM3 (ref 0.1–0.9)
MONOCYTES NFR BLD AUTO: 3.8 % (ref 5–12)
NEUTROPHILS NFR BLD AUTO: 6.75 10*3/MM3 (ref 1.7–7)
NEUTROPHILS NFR BLD AUTO: 83 % (ref 42.7–76)
NITRITE UR QL STRIP: NEGATIVE
NRBC BLD AUTO-RTO: 0 /100 WBC (ref 0–0.2)
PH UR STRIP.AUTO: 6.5 [PH] (ref 5–8)
PLATELET # BLD AUTO: 267 10*3/MM3 (ref 140–450)
PMV BLD AUTO: 9.3 FL (ref 6–12)
POTASSIUM SERPL-SCNC: 4.2 MMOL/L (ref 3.5–5.2)
PROT SERPL-MCNC: 6.8 G/DL (ref 6–8.5)
PROT UR QL STRIP: NEGATIVE
RBC # BLD AUTO: 4.61 10*6/MM3 (ref 3.77–5.28)
RBC # UR STRIP: ABNORMAL /HPF
REF LAB TEST METHOD: ABNORMAL
SODIUM SERPL-SCNC: 140 MMOL/L (ref 136–145)
SP GR UR STRIP: 1.01 (ref 1–1.03)
SQUAMOUS #/AREA URNS HPF: ABNORMAL /HPF
UROBILINOGEN UR QL STRIP: ABNORMAL
WBC # UR STRIP: ABNORMAL /HPF
WBC NRBC COR # BLD: 8.14 10*3/MM3 (ref 3.4–10.8)
WHOLE BLOOD HOLD COAG: NORMAL
WHOLE BLOOD HOLD SPECIMEN: NORMAL

## 2022-08-14 PROCEDURE — 83690 ASSAY OF LIPASE: CPT | Performed by: EMERGENCY MEDICINE

## 2022-08-14 PROCEDURE — 96365 THER/PROPH/DIAG IV INF INIT: CPT

## 2022-08-14 PROCEDURE — 74176 CT ABD & PELVIS W/O CONTRAST: CPT

## 2022-08-14 PROCEDURE — 80053 COMPREHEN METABOLIC PANEL: CPT | Performed by: EMERGENCY MEDICINE

## 2022-08-14 PROCEDURE — 25010000002 CEFTRIAXONE PER 250 MG: Performed by: PHYSICIAN ASSISTANT

## 2022-08-14 PROCEDURE — 85025 COMPLETE CBC W/AUTO DIFF WBC: CPT | Performed by: EMERGENCY MEDICINE

## 2022-08-14 PROCEDURE — 96375 TX/PRO/DX INJ NEW DRUG ADDON: CPT

## 2022-08-14 PROCEDURE — 25010000002 KETOROLAC TROMETHAMINE PER 15 MG: Performed by: PHYSICIAN ASSISTANT

## 2022-08-14 PROCEDURE — 99283 EMERGENCY DEPT VISIT LOW MDM: CPT

## 2022-08-14 PROCEDURE — 81001 URINALYSIS AUTO W/SCOPE: CPT | Performed by: EMERGENCY MEDICINE

## 2022-08-14 PROCEDURE — 84702 CHORIONIC GONADOTROPIN TEST: CPT | Performed by: EMERGENCY MEDICINE

## 2022-08-14 RX ORDER — SODIUM CHLORIDE 0.9 % (FLUSH) 0.9 %
10 SYRINGE (ML) INJECTION AS NEEDED
Status: DISCONTINUED | OUTPATIENT
Start: 2022-08-14 | End: 2022-08-14 | Stop reason: HOSPADM

## 2022-08-14 RX ORDER — ONDANSETRON 4 MG/1
4 TABLET, ORALLY DISINTEGRATING ORAL EVERY 8 HOURS PRN
Qty: 15 TABLET | Refills: 0 | Status: SHIPPED | OUTPATIENT
Start: 2022-08-14 | End: 2022-08-21

## 2022-08-14 RX ORDER — CEFTRIAXONE SODIUM 1 G/50ML
1 INJECTION, SOLUTION INTRAVENOUS ONCE
Status: COMPLETED | OUTPATIENT
Start: 2022-08-14 | End: 2022-08-14

## 2022-08-14 RX ORDER — KETOROLAC TROMETHAMINE 15 MG/ML
15 INJECTION, SOLUTION INTRAMUSCULAR; INTRAVENOUS ONCE
Status: COMPLETED | OUTPATIENT
Start: 2022-08-14 | End: 2022-08-14

## 2022-08-14 RX ORDER — TAMSULOSIN HYDROCHLORIDE 0.4 MG/1
1 CAPSULE ORAL DAILY
Qty: 5 CAPSULE | Refills: 0 | Status: SHIPPED | OUTPATIENT
Start: 2022-08-14 | End: 2022-08-21

## 2022-08-14 RX ORDER — PHENAZOPYRIDINE HYDROCHLORIDE 100 MG/1
200 TABLET, FILM COATED ORAL ONCE
Status: COMPLETED | OUTPATIENT
Start: 2022-08-14 | End: 2022-08-14

## 2022-08-14 RX ORDER — KETOROLAC TROMETHAMINE 10 MG/1
10 TABLET, FILM COATED ORAL EVERY 6 HOURS PRN
Qty: 12 TABLET | Refills: 0 | Status: SHIPPED | OUTPATIENT
Start: 2022-08-14 | End: 2022-08-18 | Stop reason: HOSPADM

## 2022-08-14 RX ORDER — CEPHALEXIN 500 MG/1
500 CAPSULE ORAL 3 TIMES DAILY
Qty: 21 CAPSULE | Refills: 0 | Status: ON HOLD | OUTPATIENT
Start: 2022-08-14 | End: 2022-08-21

## 2022-08-14 RX ADMIN — KETOROLAC TROMETHAMINE 15 MG: 15 INJECTION, SOLUTION INTRAMUSCULAR; INTRAVENOUS at 12:06

## 2022-08-14 RX ADMIN — PHENAZOPYRIDINE HYDROCHLORIDE 200 MG: 100 TABLET ORAL at 12:06

## 2022-08-14 RX ADMIN — CEFTRIAXONE SODIUM 1 G: 1 INJECTION, SOLUTION INTRAVENOUS at 13:19

## 2022-08-14 NOTE — ED PROVIDER NOTES
"Subjective   The patient is a 27-year-old female past medical history of hypertension presents to the emergency department chief complaint of right flank pain.  Started gradually at 7 AM. At 8:30 AM she took 200 mg Ibuprofen and increased fluid intake.  Does have mild dysuria described as burning and stinging at her urethra.  Did have kidney stone at age 7.  Passed without intervention.  Does have cyst on right ovary. Urine dark today, but not sure about blood.  Has had mild nausea but no vomiting.  Denies fever, chills, URI symptoms.      History provided by:  Patient   used: No    Flank Pain  Pain location:  R flank  Pain quality: sharp    Pain quality comment:  \"intense\"  Pain radiation: RLQ.  Pain severity now: currently mild.  Onset quality:  Gradual  Duration:  4 hours  Timing:  Unable to specify  Progression:  Waxing and waning  Chronicity:  New  Context: not sick contacts    Relieved by:  None tried  Worsened by:  Nothing  Ineffective treatments:  NSAIDs  Associated symptoms: dysuria and nausea    Associated symptoms: no chest pain, no chills, no cough, no diarrhea, no fever, no hematochezia, no hematuria, no melena, no shortness of breath, no sore throat, no vaginal bleeding, no vaginal discharge and no vomiting    Risk factors: no recent hospitalization        Review of Systems   Constitutional: Negative for chills and fever.   HENT: Negative for congestion and sore throat.    Respiratory: Negative for cough and shortness of breath.    Cardiovascular: Negative for chest pain and palpitations.   Gastrointestinal: Positive for abdominal pain and nausea. Negative for diarrhea, hematochezia, melena and vomiting.   Genitourinary: Positive for dysuria and flank pain. Negative for frequency, hematuria, urgency, vaginal bleeding and vaginal discharge.        +\"dark urine\"   Neurological: Negative for headaches.   All other systems reviewed and are negative.      Past Medical History: "   Diagnosis Date   • Fracture of foot     left foot   • Hypertension        No Known Allergies    Past Surgical History:   Procedure Laterality Date   • CHOLECYSTECTOMY         Family History   Problem Relation Age of Onset   • Breast cancer Paternal Aunt 38   • Diabetes Maternal Grandmother    • Stroke Maternal Grandfather    • Heart disease Paternal Grandmother    • Stroke Paternal Grandmother        Social History     Socioeconomic History   • Marital status: Single   Tobacco Use   • Smoking status: Never Smoker   • Smokeless tobacco: Never Used   Vaping Use   • Vaping Use: Never used   Substance and Sexual Activity   • Alcohol use: Yes     Alcohol/week: 2.0 standard drinks     Types: 2 Standard drinks or equivalent per week   • Drug use: Never   • Sexual activity: Yes     Partners: Male     Birth control/protection: I.U.D.           Objective   Physical Exam  Vitals and nursing note reviewed.   Constitutional:       Appearance: Normal appearance. She is not ill-appearing or toxic-appearing.   HENT:      Head: Normocephalic.      Nose: Nose normal.      Mouth/Throat:      Mouth: Mucous membranes are moist.   Eyes:      Conjunctiva/sclera: Conjunctivae normal.   Cardiovascular:      Rate and Rhythm: Normal rate and regular rhythm.   Pulmonary:      Effort: Pulmonary effort is normal.      Breath sounds: Normal breath sounds.   Abdominal:      General: There is no distension.      Palpations: Abdomen is soft.      Tenderness: There is abdominal tenderness (mild RLQ). There is right CVA tenderness (minimal). There is no left CVA tenderness.   Musculoskeletal:         General: Normal range of motion.      Cervical back: Normal range of motion.   Skin:     General: Skin is warm and dry.      Capillary Refill: Capillary refill takes less than 2 seconds.   Neurological:      Mental Status: She is alert.         Procedures           ED Course                                           MDM  Number of Diagnoses or  Management Options     Amount and/or Complexity of Data Reviewed  Clinical lab tests: ordered and reviewed  Tests in the radiology section of CPT®: ordered and reviewed  Discuss the patient with other providers: yes (Dr. Jay - Urology - OP clinic f/u this week. In agreement with abx management)       27-year-old female presents to ED for right flank pain x4 hours.  Had 1 kidney stone age 7 or 9.  Passed without intervention.  No other history of recurrent UTIs or kidney stones.  Is radiating to right lower quadrant abdomen.  Stated with nausea but no vomiting.  No fevers.    Vital signs reassuring she is afebrile, normotensive, not tachycardic and oxygen is 100% on room air  Abdomen with minimal tenderness to palpation no peritoneal signs, bowel sounds are active in all quadrants.  Is soft and nondistended.  Very minimal CVA tenderness    UA with trace leukocytes, nitrate negative, no hematuria  CBC with WC 8.14, no neutrophilia, H&H WNL  CMP with chloride 109, calcium 8.3, normal renal and hepatic function, no anion gap  hCG negative  Lipase WNL    CT stone study shows a 6 mm right proximal ureteral stone with mild hydronephrosis.    Consult with Dr. Ortiz urology.  No medication for emergent intervention.  Can see the patient in outpatient clinic this week.  In agreement with discharging on antibiotics.    Patient discharged with Keflex, Zofran, Flomax.  Increase hydration.  Return precautions for high fevers, p.o. intolerance, inability to urinate, worsening abdominal pain or worsening concern symptoms.    The patient is resting comfortably and feels better, is alert and in no distress. Repeat examination is unremarkable and benign; in particular, there's no discomfort at McBurney's point and there is no pulsatile mass. The history, exam, diagnostic testing, and current condition does not suggest acute appendicitis, bowel obstruction, acute cholecystitis, bowel perforation, major gastrointestinal bleeding,  severe diverticulitis, abdominal aortic aneurysm, mesenteric ischemia, volvulus, sepsis, or other significant pathology that warrants further testing, continued ED treatment, admission, for surgical evaluation at this point. The vital signs have been stable. The patient does not have uncontrollable pain, intractable vomiting, or other significant symptoms. The patient's condition is stable and appropriate for discharge from the emergency department.    Labs Reviewed   COMPREHENSIVE METABOLIC PANEL - Abnormal; Notable for the following components:       Result Value    Chloride 109 (*)     CO2 19.9 (*)     Calcium 8.3 (*)     All other components within normal limits    Narrative:     GFR Normal >60  Chronic Kidney Disease <60  Kidney Failure <15     URINALYSIS W/ MICROSCOPIC IF INDICATED (NO CULTURE) - Abnormal; Notable for the following components:    Leuk Esterase, UA Trace (*)     All other components within normal limits   CBC WITH AUTO DIFFERENTIAL - Abnormal; Notable for the following components:    Neutrophil % 83.0 (*)     Lymphocyte % 11.9 (*)     Monocyte % 3.8 (*)     All other components within normal limits   URINALYSIS, MICROSCOPIC ONLY - Abnormal; Notable for the following components:    RBC, UA 6-12 (*)     WBC, UA 6-12 (*)     Bacteria, UA 1+ (*)     All other components within normal limits   LIPASE - Normal   RAINBOW DRAW    Narrative:     The following orders were created for panel order [place] Draw.  Procedure                               Abnormality         Status                     ---------                               -----------         ------                     Green Top (Gel)[[id number]]                                  Final result               Lavender Top[[id number]]                                     Final result               Gold Top - SST[[id number]]                                   Final result               Light Blue Top[[id number]]                                   Final  result                 Please view results for these tests on the individual orders.   HCG, QUANTITATIVE, PREGNANCY    Narrative:     HCG Ranges by Gestational Age    Females - non-pregnant premenopausal   </= 1mIU/mL HCG  Females - postmenopausal               </= 7mIU/mL HCG    3 Weeks       5.4   -      72 mIU/mL  4 Weeks      10.2   -     708 mIU/mL  5 Weeks       217   -   8,245 mIU/mL  6 Weeks       152   -  32,177 mIU/mL  7 Weeks     4,059   - 153,767 mIU/mL  8 Weeks    31,366   - 149,094 mIU/mL  9 Weeks    59,109   - 135,901 mIU/mL  10 Weeks   44,186   - 170,409 mIU/mL  12 Weeks   27,107   - 201,615 mIU/mL  14 Weeks   24,302   -  93,646 mIU/mL  15 Weeks   12,540   -  69,747 mIU/mL  16 Weeks    8,904   -  55,332 mIU/mL  17 Weeks    8,240   -  51,793 mIU/mL  18 Weeks    9,649   -  55,271 mIU/mL    Results may be falsely decreased if patient taking Biotin.     CBC AND DIFFERENTIAL    Narrative:     The following orders were created for panel order CBC & Differential.  Procedure                               Abnormality         Status                     ---------                               -----------         ------                     CBC Auto Differential[[id number]]        Abnormal            Final result                 Please view results for these tests on the individual orders.   GREEN TOP   LAVENDER TOP   GOLD TOP - SST   LIGHT BLUE TOP      CT Abdomen Pelvis Stone Protocol   Final Result       1. 6 mm calculus in the proximal right ureter with mild right hydronephrosis.     2. Punctate left renal calculus.                MARLA FITZPATRICK MD          Electronically Signed and Approved By: MARLA FITZPATRICK MD on 8/14/2022 at 12:44                                Final diagnoses:   Ureterolithiasis       ED Disposition  ED Disposition     ED Disposition   Discharge    Condition   Stable    Comment   Discharge discussed with patient at bedside.              Shanta Jay MD  [address] [place] [address]  [place] [address]  [phone]    Call   8/15/2022 and schedule appt for this week if you don't receive a call Najma Glynn MD  [address] [facility]  [address]  [facility] [address]  [phone]          [facility] EMERGENCY ROOM  [address] [facility] [address] [phone]    If symptoms worsen         Medication List      New Prescriptions    cephalexin 500 MG capsule  Commonly known as: KEFLEX  Take 1 capsule by mouth 3 (Three) Times a Day for 7 days.     ondansetron ODT 4 MG disintegrating tablet  Commonly known as: ZOFRAN-ODT  Place 1 tablet on the tongue Every 8 (Eight) Hours As Needed for Nausea or Vomiting for up to 5 days.     tamsulosin 0.4 MG capsule 24 hr capsule  Commonly known as: FLOMAX  Take 1 capsule by mouth Daily for 5 days.           Where to Get Your Medications      These medications were sent to [facility] - [address] [facility] [address] - [phone] [facility] [phone]   [address] [address]    Phone: [phone]   · cephalexin 500 MG capsule  · ketorolac 10 MG tablet  · ondansetron ODT 4 MG disintegrating tablet  · tamsulosin 0.4 MG capsule 24 hr capsule          Sumit Desir PA  08/14/22 1457

## 2022-08-14 NOTE — ED TRIAGE NOTES
Patient reports she took 200mg of ibuprofen and that gave some relief, but she is still in pain. Patient reports she has a history of kidney stones and expresses concerns for that being the cause to her pain. Patient also reports that she has an ovarian cyst that may have ruptured

## 2022-08-15 ENCOUNTER — TELEPHONE (OUTPATIENT)
Dept: UROLOGY | Facility: CLINIC | Age: 27
End: 2022-08-15

## 2022-08-15 NOTE — TELEPHONE ENCOUNTER
Patient called and she can come for appointment on 08/17/22 at 3:15.  Miller is putting on the schedule.

## 2022-08-15 NOTE — TELEPHONE ENCOUNTER
I called and left a PT a message asking her to call us back and let us know if she can see us at 3:15 on Wednesday the 17th.

## 2022-08-17 ENCOUNTER — OFFICE VISIT (OUTPATIENT)
Dept: UROLOGY | Facility: CLINIC | Age: 27
End: 2022-08-17

## 2022-08-17 ENCOUNTER — PREP FOR SURGERY (OUTPATIENT)
Dept: OTHER | Facility: HOSPITAL | Age: 27
End: 2022-08-17

## 2022-08-17 VITALS — BODY MASS INDEX: 33.7 KG/M2 | HEIGHT: 64 IN | WEIGHT: 197.4 LBS

## 2022-08-17 DIAGNOSIS — N20.1 URETERAL STONE: Primary | ICD-10-CM

## 2022-08-17 DIAGNOSIS — N20.0 KIDNEY STONE: Primary | ICD-10-CM

## 2022-08-17 LAB
BILIRUB BLD-MCNC: NEGATIVE MG/DL
CLARITY, POC: CLEAR
COLOR UR: YELLOW
EXPIRATION DATE: NORMAL
GLUCOSE UR STRIP-MCNC: NEGATIVE MG/DL
KETONES UR QL: NEGATIVE
LEUKOCYTE EST, POC: NEGATIVE
Lab: NORMAL
NITRITE UR-MCNC: NEGATIVE MG/ML
PH UR: 5.5 [PH] (ref 5–8)
PROT UR STRIP-MCNC: NEGATIVE MG/DL
RBC # UR STRIP: NEGATIVE /UL
SP GR UR: 1.02 (ref 1–1.03)
UROBILINOGEN UR QL: NORMAL

## 2022-08-17 PROCEDURE — 99203 OFFICE O/P NEW LOW 30 MIN: CPT | Performed by: UROLOGY

## 2022-08-17 PROCEDURE — 81003 URINALYSIS AUTO W/O SCOPE: CPT | Performed by: UROLOGY

## 2022-08-17 RX ORDER — SODIUM CHLORIDE 9 MG/ML
100 INJECTION, SOLUTION INTRAVENOUS CONTINUOUS
Status: CANCELLED | OUTPATIENT
Start: 2022-08-17

## 2022-08-17 RX ORDER — LEVOFLOXACIN 5 MG/ML
500 INJECTION, SOLUTION INTRAVENOUS ONCE
Status: CANCELLED | OUTPATIENT
Start: 2022-08-17 | End: 2022-08-17

## 2022-08-17 NOTE — H&P
[facility]   Urology HISTORY AND PHYSICAL    Patient Name: Sherri Clifford  : 1995  MRN: [id number]  Primary Care Physician:  Najma Mckee MD  Date of admission: (Not on file)    Subjective   Subjective       Patient has a 6 mm right proximal ureteral stone and presents for removal.      Personal History     Past Medical History:   Diagnosis Date   • Fracture of foot     left foot   • Hypertension        Past Surgical History:   Procedure Laterality Date   • CHOLECYSTECTOMY         Family History: family history includes Breast cancer (age of onset: 38) in her paternal aunt; Diabetes in her maternal grandmother; Heart disease in her paternal grandmother; Stroke in her maternal grandfather and paternal grandmother. Otherwise pertinent FHx was reviewed and not pertinent to current issue.    Social History:  reports that she has never smoked. She has never used smokeless tobacco. She reports current alcohol use of about 2.0 standard drinks of alcohol per week. She reports that she does not use drugs.    Home Medications:  Levonorgestrel, cephalexin, ketorolac, ondansetron ODT, tamsulosin, and vitamin D    Allergies:  No Known Allergies    Objective    Objective     Vitals:        Physical Exam  Constitutional:       Appearance: Normal appearance.   Cardiovascular:      Rate and Rhythm: Normal rate and regular rhythm.   Pulmonary:      Effort: Pulmonary effort is normal.      Breath sounds: Normal breath sounds.   Neurological:      Mental Status: She is alert. Mental status is at baseline.   Psychiatric:         Mood and Affect: Mood and affect normal.         Speech: Speech normal.         Judgment: Judgment normal.         Result Review    Result Review:  I have personally reviewed the results from the time of this admission to 2022 16:22 EDT and agree with these findings:  [x]  Laboratory  []  Microbiology  [x]  Radiology  []  EKG/Telemetry   []  Cardiology/Vascular   []   Pathology  [x]  Old records  []  Other:      Assessment & Plan   Assessment / Plan       Active Hospital Problems:  There are no active hospital problems to display for this patient.      Plan: Right ureteroscopy, laser lithotripsy, right ureteral stent placement.    Risks and benefits discussed with patient and they are agreeable to proceed.    DVT prophylaxis:  No DVT prophylaxis order currently exists.    CODE STATUS:           Electronically signed by Shanta Jay MD, 08/17/22, 4:22 PM EDT.

## 2022-08-17 NOTE — ASSESSMENT & PLAN NOTE
- We will get her on the schedule for a right ureteroscopy, laser lithotripsy, and right ureteral stent placement. Risks, benefits, and she is agreeable to proceed.

## 2022-08-17 NOTE — H&P (VIEW-ONLY)
[facility]   Urology HISTORY AND PHYSICAL    Patient Name: Sherri Clifford  : 1995  MRN: [id number]  Primary Care Physician:  Najma Mckee MD  Date of admission: (Not on file)    Subjective   Subjective       Patient has a 6 mm right proximal ureteral stone and presents for removal.      Personal History     Past Medical History:   Diagnosis Date   • Fracture of foot     left foot   • Hypertension        Past Surgical History:   Procedure Laterality Date   • CHOLECYSTECTOMY         Family History: family history includes Breast cancer (age of onset: 38) in her paternal aunt; Diabetes in her maternal grandmother; Heart disease in her paternal grandmother; Stroke in her maternal grandfather and paternal grandmother. Otherwise pertinent FHx was reviewed and not pertinent to current issue.    Social History:  reports that she has never smoked. She has never used smokeless tobacco. She reports current alcohol use of about 2.0 standard drinks of alcohol per week. She reports that she does not use drugs.    Home Medications:  Levonorgestrel, cephalexin, ketorolac, ondansetron ODT, tamsulosin, and vitamin D    Allergies:  No Known Allergies    Objective    Objective     Vitals:        Physical Exam  Constitutional:       Appearance: Normal appearance.   Cardiovascular:      Rate and Rhythm: Normal rate and regular rhythm.   Pulmonary:      Effort: Pulmonary effort is normal.      Breath sounds: Normal breath sounds.   Neurological:      Mental Status: She is alert. Mental status is at baseline.   Psychiatric:         Mood and Affect: Mood and affect normal.         Speech: Speech normal.         Judgment: Judgment normal.         Result Review    Result Review:  I have personally reviewed the results from the time of this admission to 2022 16:22 EDT and agree with these findings:  [x]  Laboratory  []  Microbiology  [x]  Radiology  []  EKG/Telemetry   []  Cardiology/Vascular   []   Pathology  [x]  Old records  []  Other:      Assessment & Plan   Assessment / Plan       Active Hospital Problems:  There are no active hospital problems to display for this patient.      Plan: Right ureteroscopy, laser lithotripsy, right ureteral stent placement.    Risks and benefits discussed with patient and they are agreeable to proceed.    DVT prophylaxis:  No DVT prophylaxis order currently exists.    CODE STATUS:           Electronically signed by Shanta Jay MD, 08/17/22, 4:22 PM EDT.

## 2022-08-17 NOTE — PROGRESS NOTES
"Chief Complaint  Kidney Stone (Right)    Subjective          Sherri Clifford presents to [facility] UROLOGY for follow-up.     Ms. Clifford was seen in the ER on 08/14/2022. She had a CT scan done which revealed a 6-mm calculus in the proximal right ureter with mild hydronephrosis. The patient also has a punctate left renal calculus.    The patient reports she is doing well. She denies any pain currently. The patient does not feel like she urinates like she should. She reports she is drinking a lot. The patient states every morning when she wakes up after lying down all night, she gets the exact same pain. She only has 2 days left of the dilating medication. The patient is getting  on 10/22/2022. She has a shower party coming up on 09/16/2022. The patient has been drinking cranberry juice and water. She feels pressure on the side all the time. The patient is still taking Keflex. She feels like it is causing her severe headaches. The patient reports she has a cyst on her ovary.     A review of systems was completed and positive findings are noted in the HPI.      History of Present Illness    Objective   Vital Signs:   Ht 162.6 cm (64\")   Wt 89.5 kg (197 lb 6.4 oz)   BMI 33.88 kg/m²       Physical Exam  Vitals and nursing note reviewed.   Constitutional:       Appearance: Normal appearance. She is well-developed.   Pulmonary:      Effort: Pulmonary effort is normal.      Breath sounds: Normal air entry.   Neurological:      Mental Status: She is alert and oriented to person, place, and time.      Motor: Motor function is intact.   Psychiatric:         Mood and Affect: Mood normal.         Behavior: Behavior normal.          Result Review :       Results for orders placed or performed in visit on 08/17/22   POC Urinalysis Dipstick, Automated    Specimen: Urine   Result Value Ref Range    Color Yellow Yellow, Straw, Dark Yellow, Joy    Clarity, UA Clear Clear    Specific [place]  " 1.020 1.005 - 1.030    pH, Urine 5.5 5.0 - 8.0    Leukocytes Negative Negative    Nitrite, UA Negative Negative    Protein, POC Negative Negative mg/dL    Glucose, UA Negative Negative mg/dL    Ketones, UA Negative Negative    Urobilinogen, UA Normal Normal    Bilirubin Negative Negative    Blood, UA Negative Negative    Lot Number [id number]     Expiration Date 8/2,023        Data reviewed: Radiologic studies CT scan:   Study Result    Narrative & Impression   PROCEDURE:  CT ABDOMEN PELVIS STONE PROTOCOL     COMPARISON: [facility], CT, ABD PEL W/O CONTRAST, 4/10/2018, 9:17.     INDICATIONS:  Flank pain, kidney stone suspected     TECHNIQUE:    CT images were created without intravenous contrast.       PROTOCOL:     Standard imaging protocol performed                 RADIATION:      DLP: 696.5 mGy*cm               Automated exposure control was utilized to minimize radiation dose.      FINDINGS:          No suspicious infiltrate is seen in the lung bases.  Heart size appears within normal limits.  No   pericardial effusion.     No ectopic bowel gas identified.  The liver, spleen, adrenal glands, and pancreas appear   unremarkable on this limited noncontrast study.  The gallbladder appears surgically absent.  Aorta   appears normal in caliber.  No definite abdominal or pelvic lymphadenopathy is seen.  No acute   gastric abnormality is identified.  No dilated small bowel loops.  The appendix appears within   normal limits.  There appears to be mild diverticulosis of the distal colon.  No acute colonic   abnormality is seen.  IUD appears to be in expected position terminating at the uterine fundus.  No   pelvic free fluid.  No suspicious adnexal abnormality is seen.     There is mild right hydronephrosis.  There is a calculus in the proximal right ureter estimated to   measure up to 6 mm in size.  No distal ureteral calculus is seen.  No left hydronephrosis.  No   other significant right renal calculi are  seen.  There is a suspected punctate calculus in the left   lower pole.  There is mild right perinephric edema.  No other definite renal abnormality is seen.    No suspicious bladder abnormality is seen.     No acute osseous abnormality.     IMPRESSION:                 1. 6 mm calculus in the proximal right ureter with mild right hydronephrosis.    2. Punctate left renal calculus.            MARLA FITZPATRICK MD         Electronically Signed and Approved By: MARLA FITZPATRICK MD on 8/14/2022 at 12:44               Assessment and Plan    Diagnoses and all orders for this visit:    1. Kidney stone (Primary)  Assessment & Plan:  - We will get her on the schedule for a right ureteroscopy, laser lithotripsy, and right ureteral stent placement. Risks, benefits, and she is agreeable to proceed.      Orders:  -     POC Urinalysis Dipstick, Automated          Follow Up       No follow-ups on file.  Patient was given instructions and counseling regarding her condition or for health maintenance advice. Please see specific information pulled into the AVS if appropriate.       Transcribed from ambient dictation for Shanta Jay MD by Jolanta Latif.  08/17/22   16:29 EDT    Patient verbalized consent to the visit recording.

## 2022-08-18 ENCOUNTER — HOSPITAL ENCOUNTER (OUTPATIENT)
Facility: HOSPITAL | Age: 27
Setting detail: HOSPITAL OUTPATIENT SURGERY
Discharge: HOME | End: 2022-08-18
Attending: UROLOGY | Admitting: UROLOGY

## 2022-08-18 ENCOUNTER — ANESTHESIA (OUTPATIENT)
Dept: PERIOP | Facility: HOSPITAL | Age: 27
End: 2022-08-18

## 2022-08-18 ENCOUNTER — APPOINTMENT (OUTPATIENT)
Dept: GENERAL RADIOLOGY | Facility: HOSPITAL | Age: 27
End: 2022-08-18

## 2022-08-18 ENCOUNTER — ANESTHESIA EVENT (OUTPATIENT)
Dept: PERIOP | Facility: HOSPITAL | Age: 27
End: 2022-08-18

## 2022-08-18 VITALS
SYSTOLIC BLOOD PRESSURE: 145 MMHG | DIASTOLIC BLOOD PRESSURE: 102 MMHG | TEMPERATURE: 97.5 F | WEIGHT: 197.09 LBS | HEART RATE: 80 BPM | BODY MASS INDEX: 32.84 KG/M2 | RESPIRATION RATE: 18 BRPM | OXYGEN SATURATION: 100 % | HEIGHT: 65 IN

## 2022-08-18 DIAGNOSIS — N20.1 URETERAL STONE: ICD-10-CM

## 2022-08-18 LAB — B-HCG UR QL: NEGATIVE

## 2022-08-18 PROCEDURE — 25010000002 ONDANSETRON PER 1 MG: Performed by: NURSE ANESTHETIST, CERTIFIED REGISTERED

## 2022-08-18 PROCEDURE — 25010000002 MIDAZOLAM PER 1 MG: Performed by: ANESTHESIOLOGY

## 2022-08-18 PROCEDURE — C1758 CATHETER, URETERAL: HCPCS | Performed by: UROLOGY

## 2022-08-18 PROCEDURE — 88300 SURGICAL PATH GROSS: CPT | Performed by: UROLOGY

## 2022-08-18 PROCEDURE — C1769 GUIDE WIRE: HCPCS | Performed by: UROLOGY

## 2022-08-18 PROCEDURE — 76000 FLUOROSCOPY <1 HR PHYS/QHP: CPT

## 2022-08-18 PROCEDURE — C2617 STENT, NON-COR, TEM W/O DEL: HCPCS | Performed by: UROLOGY

## 2022-08-18 PROCEDURE — 25010000002 HYDROMORPHONE 1 MG/ML SOLUTION: Performed by: NURSE ANESTHETIST, CERTIFIED REGISTERED

## 2022-08-18 PROCEDURE — 25010000002 PROPOFOL 10 MG/ML EMULSION: Performed by: NURSE ANESTHETIST, CERTIFIED REGISTERED

## 2022-08-18 PROCEDURE — 25010000002 LEVOFLOXACIN PER 250 MG: Performed by: UROLOGY

## 2022-08-18 PROCEDURE — 25010000002 DEXAMETHASONE PER 1 MG: Performed by: NURSE ANESTHETIST, CERTIFIED REGISTERED

## 2022-08-18 PROCEDURE — 81025 URINE PREGNANCY TEST: CPT | Performed by: UROLOGY

## 2022-08-18 PROCEDURE — C1894 INTRO/SHEATH, NON-LASER: HCPCS | Performed by: UROLOGY

## 2022-08-18 PROCEDURE — 25010000002 FENTANYL CITRATE (PF) 50 MCG/ML SOLUTION: Performed by: NURSE ANESTHETIST, CERTIFIED REGISTERED

## 2022-08-18 PROCEDURE — 74018 RADEX ABDOMEN 1 VIEW: CPT

## 2022-08-18 PROCEDURE — 82365 CALCULUS SPECTROSCOPY: CPT | Performed by: UROLOGY

## 2022-08-18 PROCEDURE — 25010000002 KETOROLAC TROMETHAMINE PER 15 MG: Performed by: NURSE ANESTHETIST, CERTIFIED REGISTERED

## 2022-08-18 PROCEDURE — 52332 CYSTOSCOPY AND TREATMENT: CPT | Performed by: UROLOGY

## 2022-08-18 PROCEDURE — 52352 CYSTOURETERO W/STONE REMOVE: CPT | Performed by: UROLOGY

## 2022-08-18 DEVICE — STNT CLASSC DBL PIG 4.5F 24CM: Type: IMPLANTABLE DEVICE | Site: URETER | Status: FUNCTIONAL

## 2022-08-18 RX ORDER — GLYCOPYRROLATE 0.2 MG/ML
0.2 INJECTION INTRAMUSCULAR; INTRAVENOUS
Status: COMPLETED | OUTPATIENT
Start: 2022-08-18 | End: 2022-08-18

## 2022-08-18 RX ORDER — PROMETHAZINE HYDROCHLORIDE 25 MG/1
25 SUPPOSITORY RECTAL ONCE AS NEEDED
Status: DISCONTINUED | OUTPATIENT
Start: 2022-08-18 | End: 2022-08-18 | Stop reason: HOSPADM

## 2022-08-18 RX ORDER — GLYCOPYRROLATE 0.2 MG/ML
INJECTION INTRAMUSCULAR; INTRAVENOUS AS NEEDED
Status: DISCONTINUED | OUTPATIENT
Start: 2022-08-18 | End: 2022-08-18 | Stop reason: SURG

## 2022-08-18 RX ORDER — PROMETHAZINE HYDROCHLORIDE 12.5 MG/1
25 TABLET ORAL ONCE AS NEEDED
Status: DISCONTINUED | OUTPATIENT
Start: 2022-08-18 | End: 2022-08-18 | Stop reason: HOSPADM

## 2022-08-18 RX ORDER — PROMETHAZINE HYDROCHLORIDE 12.5 MG/1
12.5 TABLET ORAL ONCE AS NEEDED
Status: DISCONTINUED | OUTPATIENT
Start: 2022-08-18 | End: 2022-08-18 | Stop reason: HOSPADM

## 2022-08-18 RX ORDER — OXYCODONE HYDROCHLORIDE AND ACETAMINOPHEN 5; 325 MG/1; MG/1
1-2 TABLET ORAL EVERY 4 HOURS PRN
Qty: 20 TABLET | Refills: 0 | Status: ON HOLD | OUTPATIENT
Start: 2022-08-18 | End: 2022-08-21

## 2022-08-18 RX ORDER — KETOROLAC TROMETHAMINE 30 MG/ML
INJECTION, SOLUTION INTRAMUSCULAR; INTRAVENOUS AS NEEDED
Status: DISCONTINUED | OUTPATIENT
Start: 2022-08-18 | End: 2022-08-18 | Stop reason: SURG

## 2022-08-18 RX ORDER — LEVOFLOXACIN 5 MG/ML
500 INJECTION, SOLUTION INTRAVENOUS ONCE
Status: COMPLETED | OUTPATIENT
Start: 2022-08-18 | End: 2022-08-18

## 2022-08-18 RX ORDER — MAGNESIUM HYDROXIDE 1200 MG/15ML
LIQUID ORAL AS NEEDED
Status: DISCONTINUED | OUTPATIENT
Start: 2022-08-18 | End: 2022-08-18 | Stop reason: HOSPADM

## 2022-08-18 RX ORDER — MIDAZOLAM HYDROCHLORIDE 1 MG/ML
2 INJECTION INTRAMUSCULAR; INTRAVENOUS ONCE
Status: COMPLETED | OUTPATIENT
Start: 2022-08-18 | End: 2022-08-18

## 2022-08-18 RX ORDER — IBUPROFEN 600 MG/1
600 TABLET ORAL EVERY 6 HOURS PRN
Status: DISCONTINUED | OUTPATIENT
Start: 2022-08-18 | End: 2022-08-18 | Stop reason: HOSPADM

## 2022-08-18 RX ORDER — ACETAMINOPHEN 325 MG/1
650 TABLET ORAL ONCE
Status: DISCONTINUED | OUTPATIENT
Start: 2022-08-18 | End: 2022-08-18 | Stop reason: HOSPADM

## 2022-08-18 RX ORDER — FENTANYL CITRATE 50 UG/ML
INJECTION, SOLUTION INTRAMUSCULAR; INTRAVENOUS AS NEEDED
Status: DISCONTINUED | OUTPATIENT
Start: 2022-08-18 | End: 2022-08-18 | Stop reason: SURG

## 2022-08-18 RX ORDER — MEPERIDINE HYDROCHLORIDE 25 MG/ML
12.5 INJECTION INTRAMUSCULAR; INTRAVENOUS; SUBCUTANEOUS
Status: DISCONTINUED | OUTPATIENT
Start: 2022-08-18 | End: 2022-08-18 | Stop reason: HOSPADM

## 2022-08-18 RX ORDER — METOPROLOL TARTRATE 5 MG/5ML
INJECTION INTRAVENOUS AS NEEDED
Status: DISCONTINUED | OUTPATIENT
Start: 2022-08-18 | End: 2022-08-18 | Stop reason: SURG

## 2022-08-18 RX ORDER — ONDANSETRON 2 MG/ML
4 INJECTION INTRAMUSCULAR; INTRAVENOUS ONCE AS NEEDED
Status: DISCONTINUED | OUTPATIENT
Start: 2022-08-18 | End: 2022-08-18 | Stop reason: HOSPADM

## 2022-08-18 RX ORDER — ONDANSETRON 2 MG/ML
INJECTION INTRAMUSCULAR; INTRAVENOUS AS NEEDED
Status: DISCONTINUED | OUTPATIENT
Start: 2022-08-18 | End: 2022-08-18 | Stop reason: SURG

## 2022-08-18 RX ORDER — OXYCODONE HYDROCHLORIDE 5 MG/1
5 TABLET ORAL
Status: DISCONTINUED | OUTPATIENT
Start: 2022-08-18 | End: 2022-08-18 | Stop reason: HOSPADM

## 2022-08-18 RX ORDER — ACETAMINOPHEN 500 MG
1000 TABLET ORAL ONCE
Status: COMPLETED | OUTPATIENT
Start: 2022-08-18 | End: 2022-08-18

## 2022-08-18 RX ORDER — SODIUM CHLORIDE, SODIUM LACTATE, POTASSIUM CHLORIDE, CALCIUM CHLORIDE 600; 310; 30; 20 MG/100ML; MG/100ML; MG/100ML; MG/100ML
9 INJECTION, SOLUTION INTRAVENOUS CONTINUOUS PRN
Status: DISCONTINUED | OUTPATIENT
Start: 2022-08-18 | End: 2022-08-18 | Stop reason: HOSPADM

## 2022-08-18 RX ORDER — SODIUM CHLORIDE 9 MG/ML
100 INJECTION, SOLUTION INTRAVENOUS CONTINUOUS
Status: DISCONTINUED | OUTPATIENT
Start: 2022-08-18 | End: 2022-08-18 | Stop reason: HOSPADM

## 2022-08-18 RX ORDER — LIDOCAINE HYDROCHLORIDE 20 MG/ML
INJECTION, SOLUTION EPIDURAL; INFILTRATION; INTRACAUDAL; PERINEURAL AS NEEDED
Status: DISCONTINUED | OUTPATIENT
Start: 2022-08-18 | End: 2022-08-18 | Stop reason: SURG

## 2022-08-18 RX ORDER — ROCURONIUM BROMIDE 10 MG/ML
INJECTION, SOLUTION INTRAVENOUS AS NEEDED
Status: DISCONTINUED | OUTPATIENT
Start: 2022-08-18 | End: 2022-08-18 | Stop reason: SURG

## 2022-08-18 RX ORDER — DEXAMETHASONE SODIUM PHOSPHATE 4 MG/ML
INJECTION, SOLUTION INTRA-ARTICULAR; INTRALESIONAL; INTRAMUSCULAR; INTRAVENOUS; SOFT TISSUE AS NEEDED
Status: DISCONTINUED | OUTPATIENT
Start: 2022-08-18 | End: 2022-08-18 | Stop reason: SURG

## 2022-08-18 RX ORDER — PROPOFOL 10 MG/ML
VIAL (ML) INTRAVENOUS AS NEEDED
Status: DISCONTINUED | OUTPATIENT
Start: 2022-08-18 | End: 2022-08-18 | Stop reason: SURG

## 2022-08-18 RX ADMIN — ONDANSETRON 4 MG: 2 INJECTION INTRAMUSCULAR; INTRAVENOUS at 18:55

## 2022-08-18 RX ADMIN — METOPROLOL TARTRATE 5 MG: 5 INJECTION INTRAVENOUS at 18:58

## 2022-08-18 RX ADMIN — GLYCOPYRROLATE 0.2 MG: 0.2 INJECTION INTRAMUSCULAR; INTRAVENOUS at 18:49

## 2022-08-18 RX ADMIN — SUGAMMADEX 200 MG: 100 INJECTION, SOLUTION INTRAVENOUS at 19:01

## 2022-08-18 RX ADMIN — LIDOCAINE HYDROCHLORIDE 100 MG: 20 INJECTION, SOLUTION EPIDURAL; INFILTRATION; INTRACAUDAL; PERINEURAL at 18:36

## 2022-08-18 RX ADMIN — SODIUM CHLORIDE, POTASSIUM CHLORIDE, SODIUM LACTATE AND CALCIUM CHLORIDE: 600; 310; 30; 20 INJECTION, SOLUTION INTRAVENOUS at 19:03

## 2022-08-18 RX ADMIN — GLYCOPYRROLATE 0.2 MG: 0.2 INJECTION INTRAMUSCULAR; INTRAVENOUS at 18:07

## 2022-08-18 RX ADMIN — LEVOFLOXACIN 500 MG: 500 INJECTION, SOLUTION INTRAVENOUS at 18:40

## 2022-08-18 RX ADMIN — SODIUM CHLORIDE, POTASSIUM CHLORIDE, SODIUM LACTATE AND CALCIUM CHLORIDE 9 ML/HR: 600; 310; 30; 20 INJECTION, SOLUTION INTRAVENOUS at 18:06

## 2022-08-18 RX ADMIN — OXYCODONE HYDROCHLORIDE 5 MG: 5 TABLET ORAL at 19:31

## 2022-08-18 RX ADMIN — ACETAMINOPHEN 1000 MG: 500 TABLET ORAL at 18:06

## 2022-08-18 RX ADMIN — ROCURONIUM BROMIDE 50 MG: 10 INJECTION INTRAVENOUS at 18:37

## 2022-08-18 RX ADMIN — FENTANYL CITRATE 100 MCG: 50 INJECTION, SOLUTION INTRAMUSCULAR; INTRAVENOUS at 18:36

## 2022-08-18 RX ADMIN — HYDROMORPHONE HYDROCHLORIDE 0.5 MG: 1 INJECTION, SOLUTION INTRAMUSCULAR; INTRAVENOUS; SUBCUTANEOUS at 19:30

## 2022-08-18 RX ADMIN — PROPOFOL 200 MG: 10 INJECTION, EMULSION INTRAVENOUS at 18:36

## 2022-08-18 RX ADMIN — DEXAMETHASONE SODIUM PHOSPHATE 4 MG: 4 INJECTION, SOLUTION INTRA-ARTICULAR; INTRALESIONAL; INTRAMUSCULAR; INTRAVENOUS; SOFT TISSUE at 18:50

## 2022-08-18 RX ADMIN — KETOROLAC TROMETHAMINE 15 MG: 30 INJECTION, SOLUTION INTRAMUSCULAR; INTRAVENOUS at 18:56

## 2022-08-18 RX ADMIN — MIDAZOLAM HYDROCHLORIDE 2 MG: 2 INJECTION, SOLUTION INTRAMUSCULAR; INTRAVENOUS at 18:07

## 2022-08-18 NOTE — OP NOTE
URETEROSCOPY LASER LITHOTRIPSY WITH STENT INSERTION  Procedure Report    Patient Name:  Sherri Boydkokarl  YOB: 1995    Date of Surgery:  8/18/2022     Pre-op Diagnosis:   Ureteral stone [N20.1]       Post-Op Diagnosis Codes:     * Ureteral stone [N20.1]      Procedure/CPT® Codes:    Procedure(s):  RIGHT URETEROSCOPY, STONE BASKETING WITH URETERAL STENT INSERTION      Staff:  Surgeon(s):  Shanta Jay MD         Anesthesia: General    Estimated Blood Loss: 0 mL    Implants:    Implant Name Type Inv. Item Serial No.  Lot No. LRB No. Used Action   STNT CLASSC DBL PIG 4.5F 24CM - [id number] Stent STNT CLASSC DBL PIG 4.5F 24CM  [facility] [id number] 1 Implanted       Specimen:          Specimens     ID Source Type Tests Collected By Collected At Frozen?    A Ureter, Right Calculus · TISSUE PATHOLOGY EXAM  · STONE ANALYSIS   Shanta Jay MD 8/18/22 4578     Description: RIGHT URETERAL STONE              Complications: None    Description of Procedure:     After proper consent was obtained, patient was taken to operating room and placed in the dorsal lithotomy position.  The patient was prepped and draped in the normal sterile fashion for a right ureteroscopy.      A 22 [place] rigid cystoscopy was passed per urethra into the bladder.  The bladder was inspected in a systemic meridian fashion.  No stones, tumors or other abnormalities were seen.      A glide wire was passed up the right ureteral orifice without difficulty.  A dual lumen catheter was placed and a second wire was placed as a safety wire.  Over the working wire a ureteral access sheath was passed.  A flexible scope was then passed into the ureter.  The right ureteral stone was encountered in the right UPJ.       A stone basket was placed into the ureter and the stone was grasped and removed.      There was no evidence of injury to the ureter.  The ureteroscope was removed.  Over the wire, a 4.5 [place]  24cm stent was passed.  Under fluoroscopy it was seen curling in the renal pelvis and under cystoscopy was seen curling in the bladder.  The cystoscope was removed.      A string was left on the stent.      The patient tolerated the procedure well and was transferred to the PACU in stable condition.      Shanta Jay MD     Date: 8/18/2022  Time: 19:17 EDT

## 2022-08-18 NOTE — DISCHARGE INSTRUCTIONS
DISCHARGE INSTRUCTIONS  Ureteroscopy, Stone Basket Extraction, Urethral Stent Insertion - Right      For your surgery you had:  General anesthesia (you may have a sore throat for the first 24 hours)  You have received an anesthesia medication today that can cause hormonal forms of birth control to be ineffective. You should use a different form of birth control (to prevent pregnancy) for 7 days.   You may experience dizziness, drowsiness, or lightheadedness for several hours following surgery.  Do not stay alone today or tonight.  Limit your activity for 24 hours.  You should not drive or operate machinery, drink alcohol, or sign legally binding documents for 24 hours or while you are taking pain medication.  Resume your diet slowly.  Follow any special dietary instructions you may have been given by your doctor.    Last dose of pain medication was given at:    TYLENOL 1000 MG AT 6:06 PM  OXYIR 5 MG AT 7:30 PM .     NOTIFY YOUR DOCTOR IF YOU EXPERIENCE ANY OF THE FOLLOWING:  Temperature greater than 101 degrees Fahrenheit  Shaking Chills  Redness or excessive drainage from incision  Nausea, vomiting and/or pain that is not controlled by prescribed medications  Increase in bleeding or bleeding that is excessive  Unable to urinate in 6 hours after surgery  If unable to reach your doctor, please go to the closest Emergency Room  Strain urine if instructed by physician.  Collect any fragments and take with you on your scheduled appointment. You may pass stone pieces or small blood clots.  Blood in your urine is normal.  It could be light pink to cherry color.  Drink 6-8 glasses of fluid each day to assist with passing of stone fragments.  Back pain is common.  It may feel like a dull ache or back spasm.  Urine will be bloody for several days.  Slight redness or bruising may be noticed on treated side.  If you have a stent, it must be managed by your urologist.  Do NOT forget.  Medications per physician instructions as  indicated on Discharge Medication Information Sheet.      SPECIAL INSTRUCTIONS:         Remove stent by string on Tuesday per Dr Jay                                            URETERAL STENT TEACHING SHEET   Frequently Asked Questions about Ureteral Stents          What is a ureteral stent?  A ureteral stent is a small, soft tube that is about 10-12 inches long and about as big around as a swizzle stick. It is placed in the ureter, which is the muscular tube that drains urine from the kidney to the bladder.  Each end of the stent is shaped like a pigtail. One end of the tube sits inside the kidney, and the other end sits in the bladder. The purpose of the stent is to hold the ureter open and maintain proper drainage of urine.  It usually is temporary but may be used long term.  This decision will be made by your doctor.  When is the stent used?  A stent is used in a number of situations.  A stent is placed if the doctor is concerned that urine might not drain well through the ureter, due to blockage or as a reaction to surgery.  Stents usually will be placed in a ureter that has been irritated during a procedure that involves removal of a stone.  Stents for this reason are usually left in place for about a week.  These stents ensure that the ureter does not spasm and collapse after the trauma of the procedure.  Does the stent cause any symptoms?  Many patients do feel the stent.  Most commonly there is bladder irritation, typically causing frequent and/or uncomfortable urination and a sensation of pressure over the bladder or in the lower abdomen. Bloody urine is common. Some patients experience pain in the kidney during urination. There can be urinary tract infections associated with the stent so it is very important that you practice good hygiene. Once the stent is removed, all of the symptoms associated with the stent will quite rapidly disappear (oftentimes immediately). While the stent is in place, you may  carry on with most normal activities, including work.  Strenuous activity may cause discomfort. Showering is preferred to tub baths while your stent is in place. If you must take a tub bath, do not use bubble baths or oils as they may lead to infection.       When should the stent be removed?  In some cases the stent can be removed just a few days after the procedure, while in other cases your doctor may recommend that it stay in place for longer periods of time.  You must follow-up with your doctor as directed when you have a stent since stents left in place for too long can lead to blockage, stone formation, urinary infections and ultimately, kidney failure if they are not removed. If your stent passes by itself when you urinate, or you inadvertently pull the string and begin to have large amounts of urine leakage, follow the procedure below to remove the stent.  How is the stent removed?  In some instances, your doctor may decide to leave a string on the stent.  The string is attached to the stent and the string comes out of the urethra (the natural hole where urine exits the body).  The doctor may tell you to remove the stent at home on a given day.  Stents with the string may be removed in a matter of seconds by pulling on the string.  When removing the stent, constant, steady force should be applied, to avoid starting and stopping. Something should be placed below the patient to catch any urine that leaks during removal.  You may choose to remove the stent in the shower, just be sure to have someone close at hand in case you become weak or dizzy.  After the stent is removed, it should be placed in a sealed bag and taken with you to your next office visit.  On the rare occasion that the string breaks and the stent doesn't come out, you should call your doctors office. You should urinate within 4-6 hours after your stent is removed. For this reason, your stent should be removed early in the day.  If you are  unable to urinate within 6 hours, call your doctor.   Stents without a string can be removed in the office using a cystoscope. Cystoscopy involves placement of a small flexible tube through the urethra (the hole where urine exits the body). The procedure, which usually only takes a few minutes and causes little discomfort, is performed in the office. Most patients tolerate having the stent removed using only a topical anesthetic instilled into the urethra. Since no IV line is inserted and there is no anesthesia, you do not have to be accompanied by anyone else and you can eat normally before and after the procedure.  Your doctor may choose to have you return to the hospital to have your stent removed. In this case, you will receive anesthesia and must not eat or drink anything after midnight.

## 2022-08-18 NOTE — ANESTHESIA POSTPROCEDURE EVALUATION
Patient: Sherri Clifford    Procedure Summary     Date: 08/18/22 Room / Location: [facility] OR 07 / [facility] MAIN OR    Anesthesia Start: 1829 Anesthesia Stop: 1915    Procedure: URETEROSCOPY STONE BASKETING WITH URETERAL STENT INSERTION (Right ) Diagnosis:       Ureteral stone      (Ureteral stone [N20.1])    Surgeons: Shanta Jay MD Provider:     Anesthesia Type: general ASA Status: 2          Anesthesia Type: general    Vitals  Vitals Value Taken Time   /90 08/18/22 1935   Temp 36.8 °C (98.3 °F) 08/18/22 1915   Pulse 90 08/18/22 1940   Resp 13 08/18/22 1925   SpO2 97 % 08/18/22 1940   Vitals shown include unvalidated device data.        Post Anesthesia Care and Evaluation    Patient location during evaluation: bedside  Patient participation: complete - patient participated  Level of consciousness: awake  Pain score: 0  Pain management: adequate    Airway patency: patent  Anesthetic complications: No anesthetic complications  PONV Status: none  Cardiovascular status: acceptable and stable  Respiratory status: acceptable and room air  Hydration status: acceptable    Comments: An Anesthesiologist personally participated in the most demanding procedures (including induction and emergence if applicable) in the anesthesia plan, monitored the course of anesthesia administration at frequent intervals and remained physically present and available for immediate diagnosis and treatment of emergencies.

## 2022-08-18 NOTE — ANESTHESIA PREPROCEDURE EVALUATION
Anesthesia Evaluation     Patient summary reviewed and Nursing notes reviewed   no history of anesthetic complications:  NPO Solid Status: > 8 hours  NPO Liquid Status: > 2 hours           Airway   Mallampati: II  TM distance: >3 FB  Neck ROM: full  No difficulty expected  Dental - normal exam     Pulmonary - negative pulmonary ROS and normal exam   Cardiovascular - normal exam  Exercise tolerance: good (4-7 METS)    (+) hypertension,       Neuro/Psych  (+) psychiatric history Anxiety,    GI/Hepatic/Renal/Endo    (+)   renal disease stones,     Musculoskeletal (-) negative ROS    Abdominal  - normal exam    Bowel sounds: normal.   Substance History - negative use     OB/GYN negative ob/gyn ROS         Other - negative ROS                     Anesthesia Plan    ASA 2     general     (Patient understands anesthesia not responsible for dental damage.)  intravenous induction     Anesthetic plan, risks, benefits, and alternatives have been provided, discussed and informed consent has been obtained with: patient.    Use of blood products discussed with patient .   Plan discussed with CRNA.        CODE STATUS:

## 2022-08-19 NOTE — DISCHARGE INSTR - APPOINTMENTS
Please call Dr Jay's office tomorrow morning to schedule a one week follow-up appointment from the day of surgery.    Dr Jay's Office [phone]

## 2022-08-20 ENCOUNTER — HOSPITAL ENCOUNTER (INPATIENT)
Facility: HOSPITAL | Age: 27
LOS: 1 days | Discharge: HOME | End: 2022-08-22
Attending: EMERGENCY MEDICINE | Admitting: FAMILY MEDICINE

## 2022-08-20 DIAGNOSIS — N12 PYELONEPHRITIS: ICD-10-CM

## 2022-08-20 DIAGNOSIS — N20.1 URETERAL STONE: Primary | ICD-10-CM

## 2022-08-20 LAB
ALBUMIN SERPL-MCNC: 4 G/DL (ref 3.5–5.2)
ALBUMIN/GLOB SERPL: 1.3 G/DL
ALP SERPL-CCNC: 98 U/L (ref 39–117)
ALT SERPL W P-5'-P-CCNC: 245 U/L (ref 1–33)
ANION GAP SERPL CALCULATED.3IONS-SCNC: 9.6 MMOL/L (ref 5–15)
AST SERPL-CCNC: 162 U/L (ref 1–32)
BACTERIA UR QL AUTO: ABNORMAL /HPF
BASOPHILS # BLD AUTO: 0.03 10*3/MM3 (ref 0–0.2)
BASOPHILS NFR BLD AUTO: 0.2 % (ref 0–1.5)
BILIRUB SERPL-MCNC: 1.5 MG/DL (ref 0–1.2)
BILIRUB UR QL STRIP: ABNORMAL
BUN SERPL-MCNC: 5 MG/DL (ref 6–20)
BUN/CREAT SERPL: 5.4 (ref 7–25)
CALCIUM SPEC-SCNC: 9.4 MG/DL (ref 8.6–10.5)
CHLORIDE SERPL-SCNC: 103 MMOL/L (ref 98–107)
CLARITY UR: CLEAR
CO2 SERPL-SCNC: 23.4 MMOL/L (ref 22–29)
COD CRY URNS QL: ABNORMAL /HPF
COLOR UR: ABNORMAL
CREAT SERPL-MCNC: 0.93 MG/DL (ref 0.57–1)
DEPRECATED RDW RBC AUTO: 41.5 FL (ref 37–54)
EGFRCR SERPLBLD CKD-EPI 2021: 86 ML/MIN/1.73
EOSINOPHIL # BLD AUTO: 0.01 10*3/MM3 (ref 0–0.4)
EOSINOPHIL NFR BLD AUTO: 0.1 % (ref 0.3–6.2)
ERYTHROCYTE [DISTWIDTH] IN BLOOD BY AUTOMATED COUNT: 12.6 % (ref 12.3–15.4)
GLOBULIN UR ELPH-MCNC: 3 GM/DL
GLUCOSE SERPL-MCNC: 109 MG/DL (ref 65–99)
GLUCOSE UR STRIP-MCNC: ABNORMAL MG/DL
HCG INTACT+B SERPL-ACNC: <0.5 MIU/ML
HCT VFR BLD AUTO: 37.9 % (ref 34–46.6)
HGB BLD-MCNC: 12.8 G/DL (ref 12–15.9)
HGB UR QL STRIP.AUTO: ABNORMAL
HYALINE CASTS UR QL AUTO: ABNORMAL /LPF
IMM GRANULOCYTES # BLD AUTO: 0.04 10*3/MM3 (ref 0–0.05)
IMM GRANULOCYTES NFR BLD AUTO: 0.3 % (ref 0–0.5)
KETONES UR QL STRIP: ABNORMAL
LEUKOCYTE ESTERASE UR QL STRIP.AUTO: ABNORMAL
LIPASE SERPL-CCNC: 12 U/L (ref 13–60)
LYMPHOCYTES # BLD AUTO: 1.21 10*3/MM3 (ref 0.7–3.1)
LYMPHOCYTES NFR BLD AUTO: 9.3 % (ref 19.6–45.3)
MCH RBC QN AUTO: 31 PG (ref 26.6–33)
MCHC RBC AUTO-ENTMCNC: 33.8 G/DL (ref 31.5–35.7)
MCV RBC AUTO: 91.8 FL (ref 79–97)
MONOCYTES # BLD AUTO: 0.57 10*3/MM3 (ref 0.1–0.9)
MONOCYTES NFR BLD AUTO: 4.4 % (ref 5–12)
NEUTROPHILS NFR BLD AUTO: 11.14 10*3/MM3 (ref 1.7–7)
NEUTROPHILS NFR BLD AUTO: 85.7 % (ref 42.7–76)
NITRITE UR QL STRIP: ABNORMAL
NRBC BLD AUTO-RTO: 0 /100 WBC (ref 0–0.2)
PH UR STRIP.AUTO: ABNORMAL [PH]
PLATELET # BLD AUTO: 206 10*3/MM3 (ref 140–450)
PMV BLD AUTO: 9 FL (ref 6–12)
POTASSIUM SERPL-SCNC: 3.3 MMOL/L (ref 3.5–5.2)
PROT SERPL-MCNC: 7 G/DL (ref 6–8.5)
PROT UR QL STRIP: ABNORMAL
RBC # BLD AUTO: 4.13 10*6/MM3 (ref 3.77–5.28)
RBC # UR STRIP: ABNORMAL /HPF
REF LAB TEST METHOD: ABNORMAL
SODIUM SERPL-SCNC: 136 MMOL/L (ref 136–145)
SP GR UR STRIP: 1.01 (ref 1–1.03)
SQUAMOUS #/AREA URNS HPF: ABNORMAL /HPF
UROBILINOGEN UR QL STRIP: ABNORMAL
WBC # UR STRIP: ABNORMAL /HPF
WBC CLUMPS # UR AUTO: ABNORMAL /HPF
WBC NRBC COR # BLD: 13 10*3/MM3 (ref 3.4–10.8)

## 2022-08-20 PROCEDURE — 81001 URINALYSIS AUTO W/SCOPE: CPT

## 2022-08-20 PROCEDURE — 84702 CHORIONIC GONADOTROPIN TEST: CPT

## 2022-08-20 PROCEDURE — 85025 COMPLETE CBC W/AUTO DIFF WBC: CPT

## 2022-08-20 PROCEDURE — 83690 ASSAY OF LIPASE: CPT

## 2022-08-20 PROCEDURE — 80053 COMPREHEN METABOLIC PANEL: CPT

## 2022-08-20 PROCEDURE — 99284 EMERGENCY DEPT VISIT MOD MDM: CPT

## 2022-08-20 PROCEDURE — 87040 BLOOD CULTURE FOR BACTERIA: CPT | Performed by: EMERGENCY MEDICINE

## 2022-08-20 PROCEDURE — 87086 URINE CULTURE/COLONY COUNT: CPT | Performed by: INTERNAL MEDICINE

## 2022-08-20 PROCEDURE — 83605 ASSAY OF LACTIC ACID: CPT | Performed by: EMERGENCY MEDICINE

## 2022-08-20 PROCEDURE — 36415 COLL VENOUS BLD VENIPUNCTURE: CPT

## 2022-08-20 RX ORDER — SODIUM CHLORIDE 0.9 % (FLUSH) 0.9 %
10 SYRINGE (ML) INJECTION AS NEEDED
Status: DISCONTINUED | OUTPATIENT
Start: 2022-08-20 | End: 2022-08-22 | Stop reason: HOSPADM

## 2022-08-21 ENCOUNTER — APPOINTMENT (OUTPATIENT)
Dept: ULTRASOUND IMAGING | Facility: HOSPITAL | Age: 27
End: 2022-08-21

## 2022-08-21 ENCOUNTER — APPOINTMENT (OUTPATIENT)
Dept: GENERAL RADIOLOGY | Facility: HOSPITAL | Age: 27
End: 2022-08-21

## 2022-08-21 PROBLEM — N12 PYELONEPHRITIS: Status: ACTIVE | Noted: 2022-08-21

## 2022-08-21 LAB
ANION GAP SERPL CALCULATED.3IONS-SCNC: 8.5 MMOL/L (ref 5–15)
BASOPHILS # BLD AUTO: 0.02 10*3/MM3 (ref 0–0.2)
BASOPHILS NFR BLD AUTO: 0.2 % (ref 0–1.5)
BUN SERPL-MCNC: 5 MG/DL (ref 6–20)
BUN/CREAT SERPL: 6.1 (ref 7–25)
CALCIUM SPEC-SCNC: 8.4 MG/DL (ref 8.6–10.5)
CHLORIDE SERPL-SCNC: 106 MMOL/L (ref 98–107)
CO2 SERPL-SCNC: 22.5 MMOL/L (ref 22–29)
CREAT SERPL-MCNC: 0.82 MG/DL (ref 0.57–1)
D-LACTATE SERPL-SCNC: 1.2 MMOL/L (ref 0.5–2)
DEPRECATED RDW RBC AUTO: 42.2 FL (ref 37–54)
EGFRCR SERPLBLD CKD-EPI 2021: 100.1 ML/MIN/1.73
EOSINOPHIL # BLD AUTO: 0.02 10*3/MM3 (ref 0–0.4)
EOSINOPHIL NFR BLD AUTO: 0.2 % (ref 0.3–6.2)
ERYTHROCYTE [DISTWIDTH] IN BLOOD BY AUTOMATED COUNT: 12.6 % (ref 12.3–15.4)
GLUCOSE SERPL-MCNC: 125 MG/DL (ref 65–99)
HCT VFR BLD AUTO: 33.2 % (ref 34–46.6)
HGB BLD-MCNC: 11.3 G/DL (ref 12–15.9)
HOLD SPECIMEN: NORMAL
HOLD SPECIMEN: NORMAL
IMM GRANULOCYTES # BLD AUTO: 0.04 10*3/MM3 (ref 0–0.05)
IMM GRANULOCYTES NFR BLD AUTO: 0.4 % (ref 0–0.5)
LYMPHOCYTES # BLD AUTO: 1.1 10*3/MM3 (ref 0.7–3.1)
LYMPHOCYTES NFR BLD AUTO: 9.7 % (ref 19.6–45.3)
MCH RBC QN AUTO: 31.2 PG (ref 26.6–33)
MCHC RBC AUTO-ENTMCNC: 34 G/DL (ref 31.5–35.7)
MCV RBC AUTO: 91.7 FL (ref 79–97)
MONOCYTES # BLD AUTO: 0.45 10*3/MM3 (ref 0.1–0.9)
MONOCYTES NFR BLD AUTO: 4 % (ref 5–12)
NEUTROPHILS NFR BLD AUTO: 85.5 % (ref 42.7–76)
NEUTROPHILS NFR BLD AUTO: 9.74 10*3/MM3 (ref 1.7–7)
NRBC BLD AUTO-RTO: 0 /100 WBC (ref 0–0.2)
PLATELET # BLD AUTO: 181 10*3/MM3 (ref 140–450)
PMV BLD AUTO: 9.7 FL (ref 6–12)
POTASSIUM SERPL-SCNC: 3.4 MMOL/L (ref 3.5–5.2)
RBC # BLD AUTO: 3.62 10*6/MM3 (ref 3.77–5.28)
SODIUM SERPL-SCNC: 137 MMOL/L (ref 136–145)
WBC NRBC COR # BLD: 11.37 10*3/MM3 (ref 3.4–10.8)
WHOLE BLOOD HOLD COAG: NORMAL
WHOLE BLOOD HOLD SPECIMEN: NORMAL

## 2022-08-21 PROCEDURE — 99223 1ST HOSP IP/OBS HIGH 75: CPT | Performed by: FAMILY MEDICINE

## 2022-08-21 PROCEDURE — 25010000002 ONDANSETRON PER 1 MG: Performed by: EMERGENCY MEDICINE

## 2022-08-21 PROCEDURE — 25010000002 KETOROLAC TROMETHAMINE PER 15 MG: Performed by: INTERNAL MEDICINE

## 2022-08-21 PROCEDURE — 76705 ECHO EXAM OF ABDOMEN: CPT

## 2022-08-21 PROCEDURE — 25010000002 HYDROMORPHONE 1 MG/ML SOLUTION: Performed by: EMERGENCY MEDICINE

## 2022-08-21 PROCEDURE — 80048 BASIC METABOLIC PNL TOTAL CA: CPT | Performed by: FAMILY MEDICINE

## 2022-08-21 PROCEDURE — 74018 RADEX ABDOMEN 1 VIEW: CPT

## 2022-08-21 PROCEDURE — 25010000002 KETOROLAC TROMETHAMINE PER 15 MG: Performed by: EMERGENCY MEDICINE

## 2022-08-21 PROCEDURE — 99221 1ST HOSP IP/OBS SF/LOW 40: CPT | Performed by: UROLOGY

## 2022-08-21 PROCEDURE — 25010000002 CEFTRIAXONE PER 250 MG: Performed by: EMERGENCY MEDICINE

## 2022-08-21 PROCEDURE — 85025 COMPLETE CBC W/AUTO DIFF WBC: CPT | Performed by: FAMILY MEDICINE

## 2022-08-21 PROCEDURE — 25010000002 MORPHINE PER 10 MG: Performed by: FAMILY MEDICINE

## 2022-08-21 RX ORDER — ACETAMINOPHEN 160 MG/5ML
650 SOLUTION ORAL EVERY 4 HOURS PRN
Status: DISCONTINUED | OUTPATIENT
Start: 2022-08-21 | End: 2022-08-21

## 2022-08-21 RX ORDER — AMOXICILLIN 250 MG
2 CAPSULE ORAL 2 TIMES DAILY
Status: DISCONTINUED | OUTPATIENT
Start: 2022-08-21 | End: 2022-08-22 | Stop reason: HOSPADM

## 2022-08-21 RX ORDER — POLYETHYLENE GLYCOL 3350 17 G/17G
17 POWDER, FOR SOLUTION ORAL DAILY PRN
Status: DISCONTINUED | OUTPATIENT
Start: 2022-08-21 | End: 2022-08-22 | Stop reason: HOSPADM

## 2022-08-21 RX ORDER — CEFTRIAXONE SODIUM 1 G/50ML
1 INJECTION, SOLUTION INTRAVENOUS EVERY 24 HOURS
Status: DISCONTINUED | OUTPATIENT
Start: 2022-08-22 | End: 2022-08-22 | Stop reason: HOSPADM

## 2022-08-21 RX ORDER — SODIUM CHLORIDE 9 MG/ML
40 INJECTION, SOLUTION INTRAVENOUS AS NEEDED
Status: DISCONTINUED | OUTPATIENT
Start: 2022-08-21 | End: 2022-08-22 | Stop reason: HOSPADM

## 2022-08-21 RX ORDER — MORPHINE SULFATE 2 MG/ML
2 INJECTION, SOLUTION INTRAMUSCULAR; INTRAVENOUS
Status: DISCONTINUED | OUTPATIENT
Start: 2022-08-21 | End: 2022-08-22 | Stop reason: HOSPADM

## 2022-08-21 RX ORDER — KETOROLAC TROMETHAMINE 30 MG/ML
30 INJECTION, SOLUTION INTRAMUSCULAR; INTRAVENOUS EVERY 6 HOURS PRN
Status: DISCONTINUED | OUTPATIENT
Start: 2022-08-21 | End: 2022-08-22 | Stop reason: HOSPADM

## 2022-08-21 RX ORDER — ONDANSETRON 2 MG/ML
4 INJECTION INTRAMUSCULAR; INTRAVENOUS ONCE
Status: COMPLETED | OUTPATIENT
Start: 2022-08-21 | End: 2022-08-21

## 2022-08-21 RX ORDER — BISACODYL 10 MG
10 SUPPOSITORY, RECTAL RECTAL DAILY PRN
Status: DISCONTINUED | OUTPATIENT
Start: 2022-08-21 | End: 2022-08-22 | Stop reason: HOSPADM

## 2022-08-21 RX ORDER — BISACODYL 5 MG/1
5 TABLET, DELAYED RELEASE ORAL DAILY PRN
Status: DISCONTINUED | OUTPATIENT
Start: 2022-08-21 | End: 2022-08-22 | Stop reason: HOSPADM

## 2022-08-21 RX ORDER — OXYCODONE HYDROCHLORIDE 5 MG/1
10 TABLET ORAL EVERY 8 HOURS PRN
Status: DISCONTINUED | OUTPATIENT
Start: 2022-08-21 | End: 2022-08-22 | Stop reason: HOSPADM

## 2022-08-21 RX ORDER — KETOROLAC TROMETHAMINE 30 MG/ML
30 INJECTION, SOLUTION INTRAMUSCULAR; INTRAVENOUS ONCE
Status: COMPLETED | OUTPATIENT
Start: 2022-08-21 | End: 2022-08-21

## 2022-08-21 RX ORDER — OXYCODONE HYDROCHLORIDE 5 MG/1
5 TABLET ORAL EVERY 6 HOURS PRN
Status: DISCONTINUED | OUTPATIENT
Start: 2022-08-21 | End: 2022-08-22 | Stop reason: HOSPADM

## 2022-08-21 RX ORDER — ACETAMINOPHEN 325 MG/1
650 TABLET ORAL EVERY 4 HOURS PRN
Status: DISCONTINUED | OUTPATIENT
Start: 2022-08-21 | End: 2022-08-21

## 2022-08-21 RX ORDER — CHOLECALCIFEROL (VITAMIN D3) 125 MCG
5 CAPSULE ORAL NIGHTLY PRN
Status: DISCONTINUED | OUTPATIENT
Start: 2022-08-21 | End: 2022-08-22 | Stop reason: HOSPADM

## 2022-08-21 RX ORDER — SODIUM CHLORIDE 0.9 % (FLUSH) 0.9 %
10 SYRINGE (ML) INJECTION AS NEEDED
Status: DISCONTINUED | OUTPATIENT
Start: 2022-08-21 | End: 2022-08-22 | Stop reason: HOSPADM

## 2022-08-21 RX ORDER — SODIUM CHLORIDE, SODIUM LACTATE, POTASSIUM CHLORIDE, CALCIUM CHLORIDE 600; 310; 30; 20 MG/100ML; MG/100ML; MG/100ML; MG/100ML
100 INJECTION, SOLUTION INTRAVENOUS CONTINUOUS
Status: DISCONTINUED | OUTPATIENT
Start: 2022-08-21 | End: 2022-08-22 | Stop reason: HOSPADM

## 2022-08-21 RX ORDER — ONDANSETRON 2 MG/ML
4 INJECTION INTRAMUSCULAR; INTRAVENOUS EVERY 6 HOURS PRN
Status: DISCONTINUED | OUTPATIENT
Start: 2022-08-21 | End: 2022-08-22 | Stop reason: HOSPADM

## 2022-08-21 RX ORDER — CEFTRIAXONE SODIUM 1 G/50ML
1 INJECTION, SOLUTION INTRAVENOUS ONCE
Status: COMPLETED | OUTPATIENT
Start: 2022-08-21 | End: 2022-08-21

## 2022-08-21 RX ORDER — SODIUM CHLORIDE 0.9 % (FLUSH) 0.9 %
10 SYRINGE (ML) INJECTION EVERY 12 HOURS SCHEDULED
Status: DISCONTINUED | OUTPATIENT
Start: 2022-08-21 | End: 2022-08-22 | Stop reason: HOSPADM

## 2022-08-21 RX ORDER — NALOXONE HCL 0.4 MG/ML
0.4 VIAL (ML) INJECTION
Status: DISCONTINUED | OUTPATIENT
Start: 2022-08-21 | End: 2022-08-22 | Stop reason: HOSPADM

## 2022-08-21 RX ORDER — ACETAMINOPHEN 650 MG/1
650 SUPPOSITORY RECTAL EVERY 4 HOURS PRN
Status: DISCONTINUED | OUTPATIENT
Start: 2022-08-21 | End: 2022-08-21

## 2022-08-21 RX ADMIN — ACETAMINOPHEN 325MG 650 MG: 325 TABLET ORAL at 12:41

## 2022-08-21 RX ADMIN — CEFTRIAXONE SODIUM 1 G: 1 INJECTION, SOLUTION INTRAVENOUS at 01:33

## 2022-08-21 RX ADMIN — MORPHINE SULFATE 2 MG: 2 INJECTION, SOLUTION INTRAMUSCULAR; INTRAVENOUS at 03:49

## 2022-08-21 RX ADMIN — KETOROLAC TROMETHAMINE 30 MG: 30 INJECTION, SOLUTION INTRAMUSCULAR; INTRAVENOUS at 01:27

## 2022-08-21 RX ADMIN — HYDROMORPHONE HYDROCHLORIDE 1 MG: 1 INJECTION, SOLUTION INTRAMUSCULAR; INTRAVENOUS; SUBCUTANEOUS at 01:29

## 2022-08-21 RX ADMIN — ONDANSETRON 4 MG: 2 INJECTION INTRAMUSCULAR; INTRAVENOUS at 01:24

## 2022-08-21 RX ADMIN — SODIUM CHLORIDE 1000 ML: 9 INJECTION, SOLUTION INTRAVENOUS at 01:22

## 2022-08-21 RX ADMIN — MORPHINE SULFATE 2 MG: 2 INJECTION, SOLUTION INTRAMUSCULAR; INTRAVENOUS at 06:58

## 2022-08-21 RX ADMIN — OXYCODONE HYDROCHLORIDE 10 MG: 5 TABLET ORAL at 22:17

## 2022-08-21 RX ADMIN — SODIUM CHLORIDE, POTASSIUM CHLORIDE, SODIUM LACTATE AND CALCIUM CHLORIDE 100 ML/HR: 600; 310; 30; 20 INJECTION, SOLUTION INTRAVENOUS at 11:11

## 2022-08-21 RX ADMIN — MORPHINE SULFATE 2 MG: 2 INJECTION, SOLUTION INTRAMUSCULAR; INTRAVENOUS at 12:36

## 2022-08-21 RX ADMIN — MORPHINE SULFATE 2 MG: 2 INJECTION, SOLUTION INTRAMUSCULAR; INTRAVENOUS at 09:16

## 2022-08-21 RX ADMIN — SODIUM CHLORIDE, POTASSIUM CHLORIDE, SODIUM LACTATE AND CALCIUM CHLORIDE 100 ML/HR: 600; 310; 30; 20 INJECTION, SOLUTION INTRAVENOUS at 20:46

## 2022-08-21 RX ADMIN — KETOROLAC TROMETHAMINE 30 MG: 30 INJECTION, SOLUTION INTRAMUSCULAR; INTRAVENOUS at 18:18

## 2022-08-21 RX ADMIN — OXYCODONE HYDROCHLORIDE 10 MG: 5 TABLET ORAL at 14:06

## 2022-08-21 RX ADMIN — SODIUM CHLORIDE, POTASSIUM CHLORIDE, SODIUM LACTATE AND CALCIUM CHLORIDE 100 ML/HR: 600; 310; 30; 20 INJECTION, SOLUTION INTRAVENOUS at 03:49

## 2022-08-21 NOTE — ED PROVIDER NOTES
Time: 12:25 AM EDT  Arrived by: private car  Chief Complaint: flank and bladder pain  History provided by: patient  History is limited by: N/A     History of Present Illness:  Patient is a 27 y.o. year old female who presents to the emergency department with flank and bladder pain.    Pt states she had surgery for kidney stones on Thursday (08/18) and has a stent placed. Per pt, she is experiencing flank pain and pain while urinating. She states she has been taking medication for pain (Oxycodone) with no relief. Patient denies diarrhea, nausea, vomiting, or shortness of breath.      History provided by:  Patient   used: No    Fever  Severity:  Moderate  Relieved by:  Nothing  Associated symptoms: dysuria    Associated symptoms: no chest pain, no chills, no congestion, no cough, no diarrhea, no headaches, no myalgias, no nausea, no rhinorrhea, no sore throat and no vomiting      Similar Symptoms Previously: Yes  Recently seen: 08/14/22      Patient Care Team  Primary Care Provider: Najma Mckee MD    Past Medical History:     No Known Allergies  Past Medical History:   Diagnosis Date   • Fracture of foot     left foot   • Hypertension    • Kidney stone      Past Surgical History:   Procedure Laterality Date   • CHOLECYSTECTOMY     • URETEROSCOPY LASER LITHOTRIPSY WITH STENT INSERTION Right 8/18/2022    Procedure: URETEROSCOPY STONE BASKETING WITH URETERAL STENT INSERTION;  Surgeon: Shanta Jay MD;  Location: [facility];  Service: Urology;  Laterality: Right;     Family History   Problem Relation Age of Onset   • Breast cancer Paternal Aunt 38   • Diabetes Maternal Grandmother    • Stroke Maternal Grandfather    • Heart disease Paternal Grandmother    • Stroke Paternal Grandmother    • Malig Hyperthermia Neg Hx        Home Medications:  Prior to Admission medications    Medication Sig Start Date End Date Taking? Authorizing Provider   cephalexin (KEFLEX) 500 MG  "capsule Take 1 capsule by mouth 3 (Three) Times a Day for 7 days. 8/14/22 8/21/22  Sumit Desir PA-C   Levonorgestrel (Mirena, 52 MG,) 20 MCG/DAY intrauterine device IUD 1 each by Intrauterine route.    Provider, MD Hugo   oxyCODONE-acetaminophen (PERCOCET) 5-325 MG per tablet Take 1-2 tablets by mouth Every 4 (Four) Hours As Needed for Moderate Pain  or Severe Pain  (Pain). 8/18/22   Shanta Jay MD   vitamin D (ERGOCALCIFEROL) 1.25 MG (20413 UT) capsule capsule Take 1 capsule by mouth 1 (One) Time Per Week. 6/29/22   Najma Mckee MD        Social History:   Social History     Tobacco Use   • Smoking status: Never Smoker   • Smokeless tobacco: Never Used   Vaping Use   • Vaping Use: Never used   Substance Use Topics   • Alcohol use: Yes     Alcohol/week: 2.0 standard drinks     Types: 2 Standard drinks or equivalent per week   • Drug use: Never     Recent travel: no     Review of Systems:  Review of Systems   Constitutional: Negative for chills and fever.   HENT: Negative for congestion, rhinorrhea and sore throat.    Eyes: Negative for pain and visual disturbance.   Respiratory: Negative for apnea, cough, chest tightness and shortness of breath.    Cardiovascular: Negative for chest pain and palpitations.   Gastrointestinal: Negative for abdominal pain, diarrhea, nausea and vomiting.   Genitourinary: Positive for dysuria and flank pain. Negative for difficulty urinating.   Musculoskeletal: Negative for joint swelling and myalgias.   Skin: Negative for color change.   Neurological: Negative for seizures and headaches.   Psychiatric/Behavioral: Negative.    All other systems reviewed and are negative.       Physical Exam:  /76 (BP Location: Right arm, Patient Position: Lying)   Pulse 103   Temp 98.4 °F (36.9 °C) (Oral)   Resp 18   Ht 162.6 cm (64\")   Wt 89.4 kg (197 lb)   LMP 08/01/2022 Comment: pt estimated that last period ended approx. 2.5 weeks ago  SpO2 98%   " BMI 33.81 kg/m²     Physical Exam  Vitals and nursing note reviewed.   Constitutional:       General: She is not in acute distress.     Appearance: Normal appearance. She is not toxic-appearing.   HENT:      Head: Normocephalic and atraumatic.      Jaw: There is normal jaw occlusion.   Eyes:      General: Lids are normal.      Extraocular Movements: Extraocular movements intact.      Conjunctiva/sclera: Conjunctivae normal.      Pupils: Pupils are equal, round, and reactive to light.   Cardiovascular:      Rate and Rhythm: Normal rate and regular rhythm.      Pulses: Normal pulses.      Heart sounds: Normal heart sounds.   Pulmonary:      Effort: Pulmonary effort is normal. No respiratory distress.      Breath sounds: Normal breath sounds. No wheezing or rhonchi.   Abdominal:      General: Abdomen is flat.      Palpations: Abdomen is soft.      Tenderness: There is abdominal tenderness (right lower quadrant). There is right CVA tenderness. There is no guarding or rebound.   Musculoskeletal:         General: Normal range of motion.      Cervical back: Normal range of motion and neck supple.      Right lower leg: No edema.      Left lower leg: No edema.   Skin:     General: Skin is warm and dry.   Neurological:      Mental Status: She is alert and oriented to person, place, and time. Mental status is at baseline.   Psychiatric:         Mood and Affect: Mood normal.                Medications in the Emergency Department:  Medications   sodium chloride 0.9 % flush 10 mL (has no administration in time range)   sodium chloride 0.9 % flush 10 mL (has no administration in time range)   sodium chloride 0.9 % flush 10 mL (has no administration in time range)   sodium chloride 0.9 % flush 10 mL (has no administration in time range)   sodium chloride 0.9 % infusion 40 mL (has no administration in time range)   acetaminophen (TYLENOL) tablet 650 mg (has no administration in time range)     Or   acetaminophen (TYLENOL) 160 MG/5ML  solution 650 mg (has no administration in time range)     Or   acetaminophen (TYLENOL) suppository 650 mg (has no administration in time range)   sennosides-docusate (PERICOLACE) 8.6-50 MG per tablet 2 tablet (has no administration in time range)     And   polyethylene glycol (MIRALAX) packet 17 g (has no administration in time range)     And   bisacodyl (DULCOLAX) EC tablet 5 mg (has no administration in time range)     And   bisacodyl (DULCOLAX) suppository 10 mg (has no administration in time range)   ondansetron (ZOFRAN) injection 4 mg (has no administration in time range)   lactated ringers infusion (100 mL/hr Intravenous New Bag 8/21/22 0349)   melatonin tablet 5 mg (has no administration in time range)   morphine injection 2 mg (2 mg Intravenous Given 8/21/22 0349)     And   naloxone (NARCAN) injection 0.4 mg (has no administration in time range)   cefTRIAXone (ROCEPHIN) IVPB 1 g (has no administration in time range)   ketorolac (TORADOL) injection 30 mg (30 mg Intravenous Given 8/21/22 0127)   HYDROmorphone (DILAUDID) injection 1 mg (1 mg Intravenous Given 8/21/22 0129)   ondansetron (ZOFRAN) injection 4 mg (4 mg Intravenous Given 8/21/22 0124)   sodium chloride 0.9 % bolus 1,000 mL (1,000 mL Intravenous New Bag 8/21/22 0122)   cefTRIAXone (ROCEPHIN) IVPB 1 g (1 g Intravenous New Bag 8/21/22 0133)        Labs  Lab Results (last 24 hours)     Procedure Component Value Units Date/Time    CBC & Differential [[id number]]  (Abnormal) Collected: 08/20/22 2306    Specimen: Blood Updated: 08/20/22 2312    Narrative:      The following orders were created for panel order CBC & Differential.  Procedure                               Abnormality         Status                     ---------                               -----------         ------                     CBC Auto Differential[[id number]]        Abnormal            Final result                 Please view results for these tests on the individual orders.     Comprehensive Metabolic Panel [[id number]]  (Abnormal) Collected: 08/20/22 2306    Specimen: Blood Updated: 08/20/22 2328     Glucose 109 mg/dL      BUN 5 mg/dL      Creatinine 0.93 mg/dL      Sodium 136 mmol/L      Potassium 3.3 mmol/L      Chloride 103 mmol/L      CO2 23.4 mmol/L      Calcium 9.4 mg/dL      Total Protein 7.0 g/dL      Albumin 4.00 g/dL      ALT (SGPT) 245 U/L      AST (SGOT) 162 U/L      Alkaline Phosphatase 98 U/L      Total Bilirubin 1.5 mg/dL      Globulin 3.0 gm/dL      A/G Ratio 1.3 g/dL      BUN/Creatinine Ratio 5.4     Anion Gap 9.6 mmol/L      eGFR 86.0 mL/min/1.73      Comment: National Kidney Foundation and American Society of Nephrology (ASN) Task Force recommended calculation based on the Chronic Kidney Disease Epidemiology Collaboration (CKD-EPI) equation refit without adjustment for race.       Narrative:      GFR Normal >60  Chronic Kidney Disease <60  Kidney Failure <15      Lipase [[id number]]  (Abnormal) Collected: 08/20/22 2306    Specimen: Blood Updated: 08/20/22 2328     Lipase 12 U/L     hCG, Quantitative, Pregnancy [[id number]] Collected: 08/20/22 2306    Specimen: Blood Updated: 08/20/22 2333     HCG Quantitative <0.50 mIU/mL     Narrative:      HCG Ranges by Gestational Age    Females - non-pregnant premenopausal   </= 1mIU/mL HCG  Females - postmenopausal               </= 7mIU/mL HCG    3 Weeks       5.4   -      72 mIU/mL  4 Weeks      10.2   -     708 mIU/mL  5 Weeks       217   -   8,245 mIU/mL  6 Weeks       152   -  32,177 mIU/mL  7 Weeks     4,059   - 153,767 mIU/mL  8 Weeks    31,366   - 149,094 mIU/mL  9 Weeks    59,109   - 135,901 mIU/mL  10 Weeks   44,186   - 170,409 mIU/mL  12 Weeks   27,107   - 201,615 mIU/mL  14 Weeks   24,302   -  93,646 mIU/mL  15 Weeks   12,540   -  69,747 mIU/mL  16 Weeks    8,904   -  55,332 mIU/mL  17 Weeks    8,240   -  51,793 mIU/mL  18 Weeks    9,649   -  55,271 mIU/mL    Results may be falsely decreased if patient taking Biotin.       CBC Auto Differential [[id number]]  (Abnormal) Collected: 08/20/22 2306    Specimen: Blood Updated: 08/20/22 2312     WBC 13.00 10*3/mm3      RBC 4.13 10*6/mm3      Hemoglobin 12.8 g/dL      Hematocrit 37.9 %      MCV 91.8 fL      MCH 31.0 pg      MCHC 33.8 g/dL      RDW 12.6 %      RDW-SD 41.5 fl      MPV 9.0 fL      Platelets 206 10*3/mm3      Neutrophil % 85.7 %      Lymphocyte % 9.3 %      Monocyte % 4.4 %      Eosinophil % 0.1 %      Basophil % 0.2 %      Immature Grans % 0.3 %      Neutrophils, Absolute 11.14 10*3/mm3      Lymphocytes, Absolute 1.21 10*3/mm3      Monocytes, Absolute 0.57 10*3/mm3      Eosinophils, Absolute 0.01 10*3/mm3      Basophils, Absolute 0.03 10*3/mm3      Immature Grans, Absolute 0.04 10*3/mm3      nRBC 0.0 /100 WBC     Urinalysis With Microscopic If Indicated (No Culture) - Urine, Clean Catch [[id number]]  (Abnormal) Collected: 08/20/22 2320    Specimen: Urine, Clean Catch Updated: 08/20/22 2350     Color, UA Dalton     Appearance, UA Clear     pH, UA --     Comment: Result not available due to interfering substances.        Specific Gravity, UA 1.007     Glucose, UA --     Comment: Result not available due to interfering substances.        Ketones, UA --     Comment: Result not available due to interfering substances.        Bilirubin, UA --     Comment: Result not available due to interfering substances.        Blood, UA --     Comment: Result not available due to interfering substances.        Protein, UA --     Comment: Result not available due to interfering substances.        Leuk Esterase, UA --     Comment: Result not available due to interfering substances.        Nitrite, UA --     Comment: Result not available due to interfering substances.        Urobilinogen, UA --     Comment: Result not available due to interfering substances.       Urinalysis, Microscopic Only - Urine, Clean Catch [[id number]]  (Abnormal) Collected: 08/20/22 2320    Specimen: Urine, Clean Catch Updated:  08/20/22 2350     RBC, UA Too Numerous to Count /HPF      WBC, UA 13-20 /HPF      Bacteria, UA 1+ /HPF      Squamous Epithelial Cells, UA 7-12 /HPF      Hyaline Casts, UA None Seen /LPF      Calcium Oxalate Crystals, UA Small/1+ /HPF      WBC Clumps, UA Small/1+ /HPF      Methodology Manual Light Microscopy    Lactic Acid, Plasma [[id number]]  (Normal) Collected: 08/20/22 2353    Specimen: Blood Updated: 08/21/22 0019     Lactate 1.2 mmol/L     Blood Culture - Blood, Arm, Left [[id number]] Collected: 08/20/22 2353    Specimen: Blood from Arm, Left Updated: 08/20/22 2359    Blood Culture - Blood, Arm, Left [[id number]] Collected: 08/20/22 2353    Specimen: Blood from Arm, Left Updated: 08/20/22 2358           Imaging:  US Gallbladder    Result Date: 8/21/2022  PROCEDURE: US GALLBLADDER  COMPARISON: KUB, 8/21/2022.  INDICATIONS: Abdominal pain, not otherwise specified; abnormal labs, not otherwise specified.  TECHNIQUE: A limited abdominal ultrasound examination of the right upper quadrant was performed, tailored in order to evaluate the gallbladder and the biliary tree.   FINDINGS:  Two-dimensional grayscale images as well as color and spectral Doppler analysis are provided for review. The patient was fasting as per protocol for the study.  The patient has undergone cholecystectomy.  No focal abnormalities are seen involving the liver or right kidney.  The pancreas is obscured by bowel gas.  Doppler interrogation of the hepatic vasculature reveals patent vessels with normal blood flow direction.  The common bile duct measures 4 mm in diameter. No biliary ductal dilatation is seen. No choledocholithiasis. The resy-yh-gtok length of the right kidney is 10 cm.  No right hydronephrosis.  There is a right ureteral stent in place, better seen by plain radiography.  The craniocaudal dimension of the right lobe of the liver is 14.6 cm.  The left kidney, spleen, inferior vena cava, and abdominal aorta were not evaluated.         The patient has undergone cholecystectomy.  No biliary ductal dilatation.  The other findings are as detailed above.    COMMENT:  Part of this note is an electronic transcription of spoken language to printed text. The electronic translation/transcription may permit erroneous, or at times, nonsensical (or even sensical) words or phrases to be inadvertently transcribed or omitted; this  has reviewed the note for such errors (as well as additional errors); however, some may still exist.  JAMI COLBERT JR, MD       Electronically Signed and Approved By: JAMI COLBERT JR, MD on 8/21/2022 at 2:48              XR Abdomen KUB    Result Date: 8/21/2022  PROCEDURE: XR ABDOMEN KUB  COMPARISON: 8/18/2022.  INDICATIONS: EVALUATE STENT. FEVER.  FINDINGS: Two AP supine views of the abdomen and pelvis were obtained.  The bowel gas pattern is nonobstructive.  A right ureteral stent remains in proper position, similar to that seen on the prior 8/18/2022 exam.  There are pelvic phleboliths.  An intrauterine device (IUD) is in place.  External artifacts obscure detail.       There is no significant interval change in the position of the right ureteral stent is suggested radiographically since 8/18/2022.     COMMENT:  Part of this note is an electronic transcription of spoken language to printed text. The electronic translation/transcription may permit erroneous, or at times, nonsensical (or even sensical) words or phrases to be inadvertently transcribed or omitted; this  has reviewed the note for such errors (as well as additional errors); however, some may still exist.  JAMI COLBERT JR, MD       Electronically Signed and Approved By: JAMI COLBERT JR, MD on 8/21/2022 at 1:54                Procedures:  Procedures    Progress                            Medical Decision Making:  MDM  Number of Diagnoses or Management Options  Pyelonephritis  Diagnosis management comments: The patient´s CBC was reviewed and  shows no abnormalities of critical concern. Of note, there is no anemia requiring a blood transfusion and the platelet count is acceptable.  The patient´s CMP was reviewed and shows no abnormalities of critical concern. Of note, the patient´s sodium and potassium are acceptable. The patient´s liver enzymes are unremarkable. The patient´s renal function (creatinine) is preserved. The patient has a normal anion gap.  Urinalysis shows +1 bacteriuria.  KUB shows no change in the position of the right ureteral stent.  Case was discussed with Dr. Ham who agrees to consult.  Patient admitted under the care of Dr. Graham.       Amount and/or Complexity of Data Reviewed  Clinical lab tests: ordered and reviewed  Tests in the radiology section of CPT®: reviewed  Tests in the medicine section of CPT®: ordered  Decide to obtain previous medical records or to obtain history from someone other than the patient: yes  Discuss the patient with other providers: yes (0038- Dr. Ham)  Independent visualization of images, tracings, or specimens: yes    Risk of Complications, Morbidity, and/or Mortality  Presenting problems: moderate  Management options: moderate    Patient Progress  Patient progress: stable       Final diagnoses:   Pyelonephritis        Disposition:  ED Disposition     ED Disposition   Decision to Admit    Condition   --    Comment   Level of Care: Med/Surg [1]   Diagnosis: Pyelonephritis [755096]   Admitting Physician: DAPHNEY GRAVES [[id number]]   Attending Physician: DAPHNEY GRAVES [[id number]]   Certification: I Certify That Inpatient Hospital Services Are Medically Necessary For Greater Than 2 Midnights               This medical record created using voice recognition software.    Documentation assistance provided by óscar Gonsalez for Galileo Olivarez MD.  Information recorded by the óscar was done at my direction and has been verified and validated by me.       Geoff Gonsalez  08/21/22 0038        Geoff Gonsalez  08/21/22 0048       Geoff Gonsalez  08/21/22 0050       Galileo Olivarez MD  08/21/22 0544

## 2022-08-21 NOTE — CONSULTS
[facility]   UROLOGY Consult Note    Patient Name: Sherri Clifford  : 1995  MRN: [id number]  Primary Care Physician:  Najma Mckee MD  Referring Physician: No ref. provider found  Date of admission: 2022    Subjective   Subjective     Chief Complaint:     Right flank pain/fevers      HPI:  Sherri Clifford is a 27 y.o. female     Intractable right flank pain  Urinary sepsis/right pyelonephritis      Patient started having worsening pain yesterday that became intractable even with narcotic pain medication.  Started having fevers up to 101 at home and came to the emergency room      Patient admitted to the hospitalist service through the emergency room.    Overnight patient's pain is a little better.  Still bothersome.  No fevers.  No nausea.  Pain is in her right flank radiates anteriorly      On ceftriaxone    2022    0.8, , WBC 11  2022 KUB-right ureteral stent in position, images reviewed  2022 right ureteroscopy-Misty-stone removed, stent placed string was left on.          Review of Systems     10 systems reviewed and are negative other than what is listed in the HPI    Personal History     Past Medical History:   Diagnosis Date   • Fracture of foot     left foot   • Hypertension    • Kidney stone        Past Surgical History:   Procedure Laterality Date   • CHOLECYSTECTOMY     • URETEROSCOPY LASER LITHOTRIPSY WITH STENT INSERTION Right 2022    Procedure: URETEROSCOPY STONE BASKETING WITH URETERAL STENT INSERTION;  Surgeon: Shanta Jay MD;  Location: [facility];  Service: Urology;  Laterality: Right;       Family History: family history includes Breast cancer (age of onset: 38) in her paternal aunt; Diabetes in her maternal grandmother; Heart disease in her paternal grandmother; Stroke in her maternal grandfather and paternal grandmother. Otherwise pertinent FHx was reviewed and not pertinent to current issue.    Social History:   reports that she has never smoked. She has never used smokeless tobacco. She reports current alcohol use of about 2.0 standard drinks of alcohol per week. She reports that she does not use drugs.    Home Medications:  Levonorgestrel, cephalexin, oxyCODONE-acetaminophen, and vitamin D    Allergies:  No Known Allergies    Objective    Objective     Vitals:   Temp:  [98.3 °F (36.8 °C)-99.4 °F (37.4 °C)] 98.4 °F (36.9 °C)  Heart Rate:  [103-109] 103  Resp:  [18-22] 18  BP: (125-139)/(66-78) 125/76    Physical Exam:   Constitutional: Awake, alert    Respiratory: Clear to auscultation bilaterally, nonlabored respirations    Cardiovascular: RRR, no murmurs, rubs, or gallops, palpable pedal pulses bilaterally   Gastrointestinal: Positive bowel sounds, soft, nontender, nondistended   Musculoskeletal: No bilateral ankle edema, no clubbing or cyanosis to extremities    Skin: No rashes Psychiatric: Appropriate affect, cooperative   Neurologic: Oriented x 3, strength symmetric in all extremities, Cranial Nerves grossly intact to confrontation, speech clear    Result Review    Result Review:  I have personally reviewed the results from the time of this admission to 8/21/2022 06:53 EDT and agree with these findings:  []  Laboratory  []  Microbiology  []  Radiology  []  EKG/Telemetry   []  Cardiology/Vascular   []  Pathology  []  Old records  []  Other:      Assessment & Plan   Assessment / Plan     Brief Patient Summary:  Sherri Clifford is a 27 y.o. female     Active Hospital Problems:  Active Hospital Problems    Diagnosis    • Pyelonephritis        Plan:       KUB reviewed, stent is in appropriate position.  Patient does still have her string on this and I discussed with her she need to be very careful not to pull this out until she discusses this with Dr. Jay as she will likely want her to leave this in longer till she completes a course of antibiotics.  Patient voiced understanding    No operative management at this  time.      Agree with broad-spectrum antibiotics until culture results      Thank you for taking care of this postoperative patient, we will continue to follow.              Electronically signed by Andres Ham MD, 08/21/22, 6:53 AM EDT.

## 2022-08-21 NOTE — H&P
[facility] HISTORY AND PHYSICAL  Date: 2022   Patient Name: Sherri Clifford  : 1995  MRN: [id number]  Primary Care Physician:  Najma Mckee MD  Date of admission: 2022    Subjective   Subjective     Chief Complaint: Abdominal pain and fever    HPI:    Sherri Clifford is a 27 y.o. female with a history of obstructing nephrolithiasis who underwent stenting on Thursday presents the hospital with abdominal pain and fever.  Pain is located in the right flank and right lower quadrant.  Pain does not radiate.  It is sharp and severe.  Patient had a ureteral stent placed on the right on Thursday.  She was discharged home and told to return if she developed fever or uncontrolled pain.  Instead of her pain getting better it started actually getting gradually worse to where her oral pain medicine at home was not controlling her pain.  She then developed a fever of T-max 101 and she called the on-call urologist Dr. Ham who instructed her to come to the ER.  In the emergency department she was noted to have tachycardia as well as well as a significant urinary tract infection on urinalysis.  She has leukocytosis with a white blood cell count of 13.  We are asked to admit her for further inpatient management      Personal History     Past Medical History:  Past Medical History:   Diagnosis Date   • Fracture of foot     left foot   • Hypertension    • Kidney stone          Past Surgical History:  Past Surgical History:   Procedure Laterality Date   • CHOLECYSTECTOMY     • URETEROSCOPY LASER LITHOTRIPSY WITH STENT INSERTION Right 2022    Procedure: URETEROSCOPY STONE BASKETING WITH URETERAL STENT INSERTION;  Surgeon: Shanta Jay MD;  Location: [facility];  Service: Urology;  Laterality: Right;         Family History:   Family History   Problem Relation Age of Onset   • Breast cancer Paternal Aunt 38   • Diabetes Maternal Grandmother    • Stroke  Maternal Grandfather    • Heart disease Paternal Grandmother    • Stroke Paternal Grandmother    • Malig Hyperthermia Neg Hx          Social History:   Social History     Tobacco Use   • Smoking status: Never Smoker   • Smokeless tobacco: Never Used   Vaping Use   • Vaping Use: Never used   Substance Use Topics   • Alcohol use: Yes     Alcohol/week: 2.0 standard drinks     Types: 2 Standard drinks or equivalent per week   • Drug use: Never         Home Medications:  Levonorgestrel, cephalexin, oxyCODONE-acetaminophen, and vitamin D    Allergies:  No Known Allergies    Review of Systems   All systems were reviewed and negative except for: Noted above or in HPI    Objective   Objective     Vitals:   Temp:  [98.3 °F (36.8 °C)-99.4 °F (37.4 °C)] 98.4 °F (36.9 °C)  Heart Rate:  [103-109] 103  Resp:  [18-22] 18  BP: (125-139)/(66-78) 125/76    Physical Exam    Constitutional: Awake, alert, no acute distress   Eyes: Pupils equal, sclerae anicteric, no conjunctival injection   HENT: NCAT, mucous membranes moist   Neck: Supple, no thyromegaly, no lymphadenopathy, trachea midline   Respiratory: Clear to auscultation bilaterally, nonlabored respirations    Cardiovascular: RRR, no murmurs, rubs, or gallops, palpable pedal pulses bilaterally   Gastrointestinal: Positive bowel sounds, soft, entire abdomen is exquisitely tender especially in the right.  She winces even when I listen to her with my stethoscope., nondistended   Musculoskeletal: No bilateral ankle edema, no clubbing or cyanosis to extremities   Psychiatric: Appropriate affect, cooperative   Neurologic: Oriented x 3, strength symmetric in all extremities, Cranial Nerves grossly intact to confrontation, speech clear   Skin: No rashes           Assessment & Plan   Assessment / Plan     Assessment/Plan:   • Acute pyelonephritis  • Obstructing uropathy on the right status post ureteral stent placement  • Sepsis secondary to above  • Obesity with a BMI of 34    Patient is  admitted for further care  IV antibiotics  IV fluids  Consult urology  Pain control with IV pain medications  Regular diet  CBC in am for surveillance of any acute blood loss or thrombocytopenia  Metabolic panel in the morning to evaluate electrolytes and renal function  Reviewed today's labs  Reviewed telemetry  Discussed with ER physician  Risk of primary complaint: patient is at significant risk of morbidity if primary complaint is not treated especially considering the patient's comorbidities.  DVT prophylaxis:  Mechanical DVT prophylaxis orders are present.    CODE STATUS:    Code Status (Patient has no pulse and is not breathing): CPR (Attempt to Resuscitate)  Medical Interventions (Patient has pulse or is breathing): Full Support      Admission Status:  I believe this patient meets inpatient status.    Electronically signed by Tani Robles DO, 08/21/22, 2:40 AM EDT.

## 2022-08-21 NOTE — PLAN OF CARE
Goal Outcome Evaluation:  Plan of Care Reviewed With: patient        Progress: no change  Outcome Evaluation: Pt. continues to have pain. Pt. received PRN morphine to help relieve pain. Pt. on continuous fluids running at 100ml/hr. Pt. made stand by due to pt. stating she feels weak. Spouse at bedside and attentive to pt. No new issues/needs noted at this time.

## 2022-08-21 NOTE — PLAN OF CARE
Goal Outcome Evaluation:  Plan of Care Reviewed With: patient        Progress: improving    PT is AAOx4, VSS. HR is tachy. Remains on continuous IVF (LR @ 100ml/hr). Able to tolerate diet. C/O of abd pain throughout the shift with little relief from PRN Morphine. Contacted Dr. Young and PRN pain medication was revised. Gave PRN Oxy IR 10mg once and patient reports much better relief. She remains weak and requires a stand by to BR. No report of N/V/D. Will continue IV antibiotics and pain control per urology. Continue to monitor and with current plan of care at this time.

## 2022-08-21 NOTE — ED NOTES
Reports flank and bladder pain states she has been taking pain medication every 3 hours without any relief

## 2022-08-21 NOTE — NURSING NOTE
Contacted [facility] regarding order for urine culture. PT's urine is still available in lab from [facility] on 8/20/2. [facility] informed would order a new UA if not enough urine available.

## 2022-08-22 ENCOUNTER — READMISSION MANAGEMENT (OUTPATIENT)
Dept: CALL CENTER | Facility: HOSPITAL | Age: 27
End: 2022-08-22

## 2022-08-22 VITALS
SYSTOLIC BLOOD PRESSURE: 135 MMHG | HEIGHT: 64 IN | HEART RATE: 95 BPM | DIASTOLIC BLOOD PRESSURE: 87 MMHG | BODY MASS INDEX: 34.21 KG/M2 | WEIGHT: 200.4 LBS | TEMPERATURE: 98.6 F | OXYGEN SATURATION: 100 % | RESPIRATION RATE: 18 BRPM

## 2022-08-22 LAB
ALBUMIN SERPL-MCNC: 3.4 G/DL (ref 3.5–5.2)
ALBUMIN/GLOB SERPL: 1.3 G/DL
ALP SERPL-CCNC: 98 U/L (ref 39–117)
ALT SERPL W P-5'-P-CCNC: 95 U/L (ref 1–33)
ANION GAP SERPL CALCULATED.3IONS-SCNC: 7.7 MMOL/L (ref 5–15)
AST SERPL-CCNC: 28 U/L (ref 1–32)
BASOPHILS # BLD AUTO: 0.01 10*3/MM3 (ref 0–0.2)
BASOPHILS NFR BLD AUTO: 0.1 % (ref 0–1.5)
BILIRUB SERPL-MCNC: 0.5 MG/DL (ref 0–1.2)
BUN SERPL-MCNC: 4 MG/DL (ref 6–20)
BUN/CREAT SERPL: 5.8 (ref 7–25)
CALCIUM SPEC-SCNC: 8.5 MG/DL (ref 8.6–10.5)
CHLORIDE SERPL-SCNC: 106 MMOL/L (ref 98–107)
CO2 SERPL-SCNC: 24.3 MMOL/L (ref 22–29)
CREAT SERPL-MCNC: 0.69 MG/DL (ref 0.57–1)
DEPRECATED RDW RBC AUTO: 40.5 FL (ref 37–54)
EGFRCR SERPLBLD CKD-EPI 2021: 121.4 ML/MIN/1.73
EOSINOPHIL # BLD AUTO: 0.05 10*3/MM3 (ref 0–0.4)
EOSINOPHIL NFR BLD AUTO: 0.7 % (ref 0.3–6.2)
ERYTHROCYTE [DISTWIDTH] IN BLOOD BY AUTOMATED COUNT: 12.2 % (ref 12.3–15.4)
GLOBULIN UR ELPH-MCNC: 2.6 GM/DL
GLUCOSE SERPL-MCNC: 102 MG/DL (ref 65–99)
HAV IGM SERPL QL IA: NORMAL
HBV CORE IGM SERPL QL IA: NORMAL
HBV SURFACE AG SERPL QL IA: NORMAL
HCT VFR BLD AUTO: 32.1 % (ref 34–46.6)
HCV AB SER DONR QL: NORMAL
HGB BLD-MCNC: 11 G/DL (ref 12–15.9)
IMM GRANULOCYTES # BLD AUTO: 0.01 10*3/MM3 (ref 0–0.05)
IMM GRANULOCYTES NFR BLD AUTO: 0.1 % (ref 0–0.5)
LAB AP CASE REPORT: NORMAL
LAB AP CLINICAL INFORMATION: NORMAL
LYMPHOCYTES # BLD AUTO: 1.24 10*3/MM3 (ref 0.7–3.1)
LYMPHOCYTES NFR BLD AUTO: 16.3 % (ref 19.6–45.3)
MCH RBC QN AUTO: 31.1 PG (ref 26.6–33)
MCHC RBC AUTO-ENTMCNC: 34.3 G/DL (ref 31.5–35.7)
MCV RBC AUTO: 90.7 FL (ref 79–97)
MONOCYTES # BLD AUTO: 0.34 10*3/MM3 (ref 0.1–0.9)
MONOCYTES NFR BLD AUTO: 4.5 % (ref 5–12)
NEUTROPHILS NFR BLD AUTO: 5.95 10*3/MM3 (ref 1.7–7)
NEUTROPHILS NFR BLD AUTO: 78.3 % (ref 42.7–76)
NRBC BLD AUTO-RTO: 0 /100 WBC (ref 0–0.2)
PATH REPORT.FINAL DX SPEC: NORMAL
PATH REPORT.GROSS SPEC: NORMAL
PLATELET # BLD AUTO: 189 10*3/MM3 (ref 140–450)
PMV BLD AUTO: 9.5 FL (ref 6–12)
POTASSIUM SERPL-SCNC: 3.7 MMOL/L (ref 3.5–5.2)
PROT SERPL-MCNC: 6 G/DL (ref 6–8.5)
RBC # BLD AUTO: 3.54 10*6/MM3 (ref 3.77–5.28)
SODIUM SERPL-SCNC: 138 MMOL/L (ref 136–145)
WBC NRBC COR # BLD: 7.6 10*3/MM3 (ref 3.4–10.8)

## 2022-08-22 PROCEDURE — 99231 SBSQ HOSP IP/OBS SF/LOW 25: CPT | Performed by: UROLOGY

## 2022-08-22 PROCEDURE — 25010000002 CEFTRIAXONE PER 250 MG: Performed by: FAMILY MEDICINE

## 2022-08-22 PROCEDURE — 25010000002 HYDROMORPHONE 1 MG/ML SOLUTION: Performed by: INTERNAL MEDICINE

## 2022-08-22 PROCEDURE — 85025 COMPLETE CBC W/AUTO DIFF WBC: CPT | Performed by: INTERNAL MEDICINE

## 2022-08-22 PROCEDURE — 80074 ACUTE HEPATITIS PANEL: CPT | Performed by: INTERNAL MEDICINE

## 2022-08-22 PROCEDURE — 80053 COMPREHEN METABOLIC PANEL: CPT | Performed by: INTERNAL MEDICINE

## 2022-08-22 PROCEDURE — 99239 HOSP IP/OBS DSCHRG MGMT >30: CPT | Performed by: INTERNAL MEDICINE

## 2022-08-22 RX ORDER — OXYCODONE HYDROCHLORIDE AND ACETAMINOPHEN 5; 325 MG/1; MG/1
1 TABLET ORAL EVERY 6 HOURS PRN
Qty: 12 TABLET | Refills: 0 | Status: SHIPPED | OUTPATIENT
Start: 2022-08-22 | End: 2022-08-25

## 2022-08-22 RX ORDER — CEPHALEXIN 500 MG/1
500 CAPSULE ORAL 2 TIMES DAILY
Qty: 14 CAPSULE | Refills: 0 | Status: SHIPPED | OUTPATIENT
Start: 2022-08-22 | End: 2022-08-29

## 2022-08-22 RX ADMIN — SENNOSIDES AND DOCUSATE SODIUM 2 TABLET: 8.6; 5 TABLET ORAL at 10:15

## 2022-08-22 RX ADMIN — OXYCODONE HYDROCHLORIDE 10 MG: 5 TABLET ORAL at 15:52

## 2022-08-22 RX ADMIN — OXYCODONE HYDROCHLORIDE 5 MG: 5 TABLET ORAL at 10:14

## 2022-08-22 RX ADMIN — SODIUM CHLORIDE, POTASSIUM CHLORIDE, SODIUM LACTATE AND CALCIUM CHLORIDE 100 ML/HR: 600; 310; 30; 20 INJECTION, SOLUTION INTRAVENOUS at 06:55

## 2022-08-22 RX ADMIN — HYDROMORPHONE HYDROCHLORIDE 0.5 MG: 1 INJECTION, SOLUTION INTRAMUSCULAR; INTRAVENOUS; SUBCUTANEOUS at 03:45

## 2022-08-22 RX ADMIN — SODIUM CHLORIDE, POTASSIUM CHLORIDE, SODIUM LACTATE AND CALCIUM CHLORIDE 100 ML/HR: 600; 310; 30; 20 INJECTION, SOLUTION INTRAVENOUS at 16:43

## 2022-08-22 RX ADMIN — OXYCODONE HYDROCHLORIDE 10 MG: 5 TABLET ORAL at 06:55

## 2022-08-22 RX ADMIN — CEFTRIAXONE SODIUM 1 G: 1 INJECTION, SOLUTION INTRAVENOUS at 01:07

## 2022-08-22 NOTE — PAYOR COMM NOTE
"Sherri Painting N (27 y.o. Female)             Date of Birth   1995    Social Security Number       Address    [place] [address]    Home Phone   [phone]    MRN   [id number]       [place]   Patient Refused    Marital Status   Single                            Admission Date   8/20/22    Admission Type   Emergency    Admitting Provider   Tani Robles DO    Attending Provider   Rakesh Young MD    Department, Room/Bed   [address], 4012/1       Discharge Date       Discharge Disposition       Discharge Destination                               Attending Provider: Rakesh Young MD    Allergies: No Known Allergies    Isolation: None   Infection: None   Code Status: CPR   Advance Care Planning Activity    Ht: 162.6 cm (64\")   Wt: 90.9 kg (200 lb 6.4 oz)    Admission Cmt: None   Principal Problem: None                Active Insurance as of 8/20/2022     Primary Coverage     Payor Plan Insurance Group Employer/Plan Group    [employer] BLUE [employer]OSS [employer] BLUE CROSS BLUE [employer] PPO [id number]     Payor Plan Address Payor Plan Phone Number Payor Plan Fax Number Effective Dates    [address] [phone]  3/1/2021 - None Entered    [place] [address]       Subscriber Name Subscriber Birth Date Member ID       SHERRI PAINTING N 1995 [id number]                 Emergency Contacts      (Rel.) Home Phone Work Phone Mobile Phone    OC SEBASTIAN (Friend) [phone] -- [phone]          ref # [id number].  Update      CONTACT   FOREST S UTILIZATION REVIEW    [facility]  [address] [place] [address]  TAX ID [id number]  NPI  [id number]    PH   [phone]   [phone]                  Callie Servin LPN    Licensed Nurse   Specialty:  Emergency Medicine   Plan of Care      Signed   Date of Service:  08/21/22 1752   Creation Time:  08/21/22 1752              Signed              Show:Clear " all  [x]Manual[x]Template[]Copied    Added by:  [x]Callie Servin LPN      []Yamile for details    Goal Outcome Evaluation:  Plan of Care Reviewed With: patient  Progress: improving     PT is AAOx4, VSS. HR is tachy. Remains on continuous IVF (LR @ 100ml/hr). Able to tolerate diet. C/O of abd pain throughout the shift with little relief from PRN Morphine. Contacted Dr. Young and PRN pain medication was revised. Gave PRN Oxy IR 10mg once and patient reports much better relief. She remains weak and requires a stand by to BR. No report of N/V/D. Will continue IV antibiotics and pain control per urology. Continue to monitor and with current plan of care at this time.                                   98.6 104-/64 sats 97%     uc and bc neg so far    Rocephin iv lr @ 100     dialudid iv x1     Physician Progress Notes (last 24 hours)      Andres Ham MD at 22 0645           [facility]   Urology Progress Note    Patient Name: Sherri Clifford  : 1995  MRN: [id number]  Primary Care Physician:  Najma Mckee MD  Date of admission: 2022    Subjective   Subjective       Pain has improved, still requiring PO narcotics.  Some low-grade fevers overnight.  No nausea or vomiting.  No chest pain    Objective   Objective     Vitals:   Temp:  [98.5 °F (36.9 °C)-99.8 °F (37.7 °C)] 99.8 °F (37.7 °C)  Heart Rate:  [] 104  Resp:  [14-20] 20  BP: (127-142)/(71-80) 128/75  Physical Exam     Alert and oriented x3  No acute distress  Unlabored respirations  Nontender/nondistended       Result Review    Result Review:  I have personally reviewed the results from the time of this admission to 2022 06:45 EDT and agree with these findings:  []  Laboratory  []  Microbiology  []  Radiology  []  EKG/Telemetry   []  Cardiology/Vascular   []  Pathology  []  Old records  []  Other:      Assessment & Plan   Assessment / Plan     Brief Patient Summary:  Sherri Clifford is a 27  y.o. female     Status post right ureteroscopy      Active Hospital Problems:  Active Hospital Problems    Diagnosis    • Pyelonephritis      Plan:        Patient will need to keep her stent in longer than previously discussed.  She understands to be very careful not to pull this out early with her string    Will let Dr. Jay know so she can contact patient with plan.     Patient voiced understanding     No operative management at this time.        Agree with broad-spectrum antibiotics until culture results        Thank you for taking care of this postoperative patient, we will continue to follow.    Electronically signed by Andres Ham MD, 08/22/22, 6:45 AM EDT.             Electronically signed by Andres Ham MD at 08/22/22 0663       Consult Notes (last 24 hours)  Notes from 08/21/22 1514 through 08/22/22 1514   No notes of this type exist for this encounter.         Miri Mueller, RN    Registered Nurse      Plan of Care      Addendum   Date of Service:  08/22/22 0338   Creation Time:  08/22/22 0338                    Show:Clear all  [x]Manual[x]Template[]Copied    Added by:  [x]Miri Mueller, RN      []Yamile for details    Goal Outcome Evaluation:  Plan of Care Reviewed With: patient  Progress: improving  Outcome Evaluation: pt aox4, Vs stable. Flank pain controlled with prn oxycodone. No new issues at this time. pt did require prn dilaudid for breakthrough pain          Revision History

## 2022-08-22 NOTE — PROGRESS NOTES
[facility]   Urology Progress Note    Patient Name: Sherri Clifford  : 1995  MRN: [id number]  Primary Care Physician:  Najma Mckee MD  Date of admission: 2022    Subjective   Subjective       Pain has improved, still requiring PO narcotics.  Some low-grade fevers overnight.  No nausea or vomiting.  No chest pain    Objective   Objective     Vitals:   Temp:  [98.5 °F (36.9 °C)-99.8 °F (37.7 °C)] 99.8 °F (37.7 °C)  Heart Rate:  [] 104  Resp:  [14-20] 20  BP: (127-142)/(71-80) 128/75  Physical Exam     Alert and oriented x3  No acute distress  Unlabored respirations  Nontender/nondistended       Result Review    Result Review:  I have personally reviewed the results from the time of this admission to 2022 06:45 EDT and agree with these findings:  []  Laboratory  []  Microbiology  []  Radiology  []  EKG/Telemetry   []  Cardiology/Vascular   []  Pathology  []  Old records  []  Other:      Assessment & Plan   Assessment / Plan     Brief Patient Summary:  Sherri Clifford is a 27 y.o. female     Status post right ureteroscopy      Active Hospital Problems:  Active Hospital Problems    Diagnosis    • Pyelonephritis      Plan:        Patient will need to keep her stent in longer than previously discussed.  She understands to be very careful not to pull this out early with her string    Will let Dr. Jay know so she can contact patient with plan.     Patient voiced understanding     No operative management at this time.        Agree with broad-spectrum antibiotics until culture results        Thank you for taking care of this postoperative patient, we will continue to follow.    Electronically signed by Andres Ham MD, 22, 6:45 AM EDT.

## 2022-08-22 NOTE — DISCHARGE SUMMARY
[facility]        HOSPITALIST  DISCHARGE SUMMARY    Patient Name: Sherri Clifford  : 1995  MRN: [id number]    Date of Admission: 2022  Date of Discharge:  2022  Primary Care Physician: Najma Mckee MD    Consults     Date and Time Order Name Status Description    2022 12:44 AM Inpatient Hospitalist Consult      2022 12:36 AM Inpatient Urology Consult Completed           Active and Resolved Hospital Problems:  Active Hospital Problems    Diagnosis POA   • Pyelonephritis [N12] Yes      Resolved Hospital Problems   No resolved problems to display.     · Obstructing uropathy on the right status post ureteral stent placement with string.  · Sepsis secondary to above.  Resolved    Hospital Course     Hospital Course:  Sherri Clifford is a 27 y.o. female with a history of obstructing nephrolithiasis who underwent stenting on Thursday presents the hospital with abdominal pain and fever.  Pain is located in the right flank and right lower quadrant.  Pain does not radiate.  It is sharp and severe.  Patient had a ureteral stent placed on the right on Thursday.  She was discharged home and told to return if she developed fever or uncontrolled pain.  Instead of her pain getting better it started actually getting gradually worse to where her oral pain medicine at home was not controlling her pain.  She then developed a fever of T-max 101 and she called the on-call urologist Dr. Ham who instructed her to come to the ER.  In the emergency department she was noted to have tachycardia as well as well as a significant urinary tract infection on urinalysis.  She has leukocytosis with a white blood cell count of 13.  We are asked to admit her for further inpatient management       Interval follow-up.  Vital signs stable.  On room air.  Leukocytosis resolved.  Elevated LFTs improved.  Cleared by urology to be released.  To keep the stent in until discussed  with urology.  Urine culture pending.    DISCHARGE Follow Up Recommendations for labs and diagnostics: PCP to follow-up on results of urine culture.  Follow-up with PCP and urology.  Most stent as per urology      Day of Discharge     Vital Signs:  Temp:  [98.5 °F (36.9 °C)-99.8 °F (37.7 °C)] 98.6 °F (37 °C)  Heart Rate:  [] 95  Resp:  [14-20] 18  BP: (121-142)/(64-87) 135/87    Physical Exam:    Constitutional: Awake, alert, no acute distress              Eyes: Pupils equal, sclerae anicteric, no conjunctival injection              HENT: NCAT, mucous membranes moist              Neck: Supple, no thyromegaly, no lymphadenopathy, trachea midline              Respiratory: Clear to auscultation bilaterally, nonlabored respirations               Cardiovascular: RRR, no murmurs, rubs, or gallops, palpable pedal pulses bilaterally              Gastrointestinal: Positive bowel sounds, soft, entire abdomen is exquisitely tender especially in the right.  She winces even when I listen to her with my stethoscope., nondistended              Musculoskeletal: No bilateral ankle edema, no clubbing or cyanosis to extremities              Psychiatric: Appropriate affect, cooperative              Neurologic: Oriented x 3, strength symmetric in all extremities, Cranial Nerves grossly intact to confrontation, speech clear              Skin: No rashes           Discharge Details        Discharge Medications      New Medications      Instructions Start Date   cephalexin 500 MG capsule  Commonly known as: Keflex   500 mg, Oral, 2 Times Daily         Changes to Medications      Instructions Start Date   oxyCODONE-acetaminophen 5-325 MG per tablet  Commonly known as: Percocet  What changed:   · how much to take  · when to take this  · reasons to take this   1 tablet, Oral, Every 6 Hours PRN         Continue These Medications      Instructions Start Date   Mirena (52 MG) 20 MCG/DAY intrauterine device IUD  Generic drug:  Levonorgestrel   1 each, Intrauterine             No Known Allergies    Discharge Disposition:  Home or Self Care.  In private vehicle with     Diet:  Diet Instructions     Advance Diet As Tolerated            Discharge Activity:   Activity Instructions     Activity as Tolerated            CODE STATUS:  Code Status and Medical Interventions:   Ordered at: 08/21/22 0320     Code Status (Patient has no pulse and is not breathing):    CPR (Attempt to Resuscitate)     Medical Interventions (Patient has pulse or is breathing):    Full Support         Future Appointments   Date Time Provider Department Center   8/24/2022 11:30 AM Shanta Jay MD [facility]   9/12/2022  9:00 AM Najma Mckee MD [facility]       Additional Instructions for the Follow-ups that You Need to Schedule     Discharge Follow-up with PCP   As directed       Currently Documented PCP:    Najma Mckee MD    PCP Phone Number:    [phone]     Follow Up Details: 1 week         Discharge Follow-up with Specified Provider: Dr. Jay   As directed      To: Dr. Jay               Pertinent  and/or Most Recent Results     PROCEDURES:   * Cannot find OR case *     LAB RESULTS:      Lab 08/22/22  0859 08/21/22  0522 08/20/22  2353 08/20/22  2306   WBC 7.60 11.37*  --  13.00*   HEMOGLOBIN 11.0* 11.3*  --  12.8   HEMATOCRIT 32.1* 33.2*  --  37.9   PLATELETS 189 181  --  206   NEUTROS ABS 5.95 9.74*  --  11.14*   IMMATURE GRANS (ABS) 0.01 0.04  --  0.04   LYMPHS ABS 1.24 1.10  --  1.21   MONOS ABS 0.34 0.45  --  0.57   EOS ABS 0.05 0.02  --  0.01   MCV 90.7 91.7  --  91.8   LACTATE  --   --  1.2  --          Lab 08/22/22  0859 08/21/22  0522 08/20/22  2306   SODIUM 138 137 136   POTASSIUM 3.7 3.4* 3.3*   CHLORIDE 106 106 103   CO2 24.3 22.5 23.4   ANION GAP 7.7 8.5 9.6   BUN 4* 5* 5*   CREATININE 0.69 0.82 0.93   EGFR 121.4 100.1 86.0   GLUCOSE 102* 125* 109*   CALCIUM 8.5* 8.4* 9.4         Lab  08/22/22  0859 08/20/22  2306   TOTAL PROTEIN 6.0 7.0   ALBUMIN 3.40* 4.00   GLOBULIN 2.6 3.0   ALT (SGPT) 95* 245*   AST (SGOT) 28 162*   BILIRUBIN 0.5 1.5*   ALK PHOS 98 98   LIPASE  --  12*                     Brief Urine Lab Results  (Last result in the past 365 days)      Color   Clarity   Blood   Leuk Est   Nitrite   Protein   CREAT   Urine HCG        08/20/22 2320 [place]   Clear   --  Comment: Result not available due to interfering substances.   --  Comment: Result not available due to interfering substances.   --  Comment: Result not available due to interfering substances.   --  Comment: Result not available due to interfering substances.               Microbiology Results (last 10 days)     Procedure Component Value - Date/Time    Blood Culture - Blood, Arm, Left [[id number]]  (Normal) Collected: 08/20/22 2353    Lab Status: Preliminary result Specimen: Blood from Arm, Left Updated: 08/22/22 0002     Blood Culture No growth at 24 hours    Blood Culture - Blood, Arm, Left [[id number]]  (Normal) Collected: 08/20/22 2353    Lab Status: Preliminary result Specimen: Blood from Arm, Left Updated: 08/22/22 0002     Blood Culture No growth at 24 hours    Urine Culture - Urine, Urine, Clean Catch [[id number]]  (Normal) Collected: 08/20/22 2320    Lab Status: Preliminary result Specimen: Urine, Clean Catch Updated: 08/22/22 0958     Urine Culture No growth          FL < 1 Hour    Result Date: 8/19/2022  Impression:  Stone retrieval from right UPJ and right ureteral stent placement.     LEATHA COLORADO MD       Electronically Signed and Approved By: LEATHA COLORADO MD on 8/19/2022 at 3:00             US Gallbladder    Result Date: 8/21/2022  Impression:   The patient has undergone cholecystectomy.  No biliary ductal dilatation.  The other findings are as detailed above.    COMMENT:  Part of this note is an electronic transcription of spoken language to printed text. The electronic translation/transcription may permit  erroneous, or at times, nonsensical (or even sensical) words or phrases to be inadvertently transcribed or omitted; this  has reviewed the note for such errors (as well as additional errors); however, some may still exist.  JMAI COLBERT JR, MD       Electronically Signed and Approved By: JAMI COLBERT JR, MD on 8/21/2022 at 2:48              CT Abdomen Pelvis Stone Protocol    Result Date: 8/14/2022  Impression:   1. 6 mm calculus in the proximal right ureter with mild right hydronephrosis.  2. Punctate left renal calculus.     MARLA FITZPATRICK MD       Electronically Signed and Approved By: MARLA FITZPATRICK MD on 8/14/2022 at 12:44             XR Abdomen KUB    Result Date: 8/21/2022  Impression:  There is no significant interval change in the position of the right ureteral stent is suggested radiographically since 8/18/2022.     COMMENT:  Part of this note is an electronic transcription of spoken language to printed text. The electronic translation/transcription may permit erroneous, or at times, nonsensical (or even sensical) words or phrases to be inadvertently transcribed or omitted; this  has reviewed the note for such errors (as well as additional errors); however, some may still exist.  JAMI COLBERT JR, MD       Electronically Signed and Approved By: JAMI COLBERT JR, MD on 8/21/2022 at 1:54              XR Abdomen KUB    Result Date: 8/19/2022  Impression:  Stone retrieval from right UPJ and right ureteral stent placement.     LEATHA COLORADO MD       Electronically Signed and Approved By: LEATHA COLORADO MD on 8/19/2022 at 3:00                           Imaging Results (All)     Procedure Component Value Units Date/Time    US Gallbladder [[id number]] Collected: 08/21/22 0248     Updated: 08/21/22 0251    Narrative:      PROCEDURE: US GALLBLADDER     COMPARISON: KUB, 8/21/2022.     INDICATIONS: Abdominal pain, not otherwise specified; abnormal labs, not otherwise specified.      TECHNIQUE: A limited abdominal ultrasound examination of the right upper quadrant was performed,   tailored in order to evaluate the gallbladder and the biliary tree.       FINDINGS:   Two-dimensional grayscale images as well as color and spectral Doppler analysis are provided for   review. The patient was fasting as per protocol for the study.  The patient has undergone   cholecystectomy.  No focal abnormalities are seen involving the liver or right kidney.  The   pancreas is obscured by bowel gas.  Doppler interrogation of the hepatic vasculature reveals patent   vessels with normal blood flow direction.  The common bile duct measures 4 mm in diameter. No   biliary ductal dilatation is seen. No choledocholithiasis. The dwwl-no-cpkc length of the right   kidney is 10 cm.  No right hydronephrosis.  There is a right ureteral stent in place, better seen   by plain radiography.  The craniocaudal dimension of the right lobe of the liver is 14.6 cm.  The   left kidney, spleen, inferior vena cava, and abdominal aorta were not evaluated.        Impression:         The patient has undergone cholecystectomy.  No biliary ductal dilatation.  The other findings are   as detailed above.           COMMENT:  Part of this note is an electronic transcription of spoken language to printed text. The   electronic translation/transcription may permit erroneous, or at times, nonsensical (or even   sensical) words or phrases to be inadvertently transcribed or omitted; this  has   reviewed the note for such errors (as well as additional errors); however, some may still exist.     JAMI COLBERT JR, MD         Electronically Signed and Approved By: JAMI COLBERT JR, MD on 8/21/2022 at 2:48                        XR Abdomen KUB [[id number]] Collected: 08/21/22 0154     Updated: 08/21/22 0157    Narrative:      PROCEDURE: XR ABDOMEN KUB     COMPARISON: 8/18/2022.     INDICATIONS: EVALUATE STENT. FEVER.     FINDINGS: Two AP supine  views of the abdomen and pelvis were obtained.  The bowel gas pattern is   nonobstructive.  A right ureteral stent remains in proper position, similar to that seen on the   prior 8/18/2022 exam.  There are pelvic phleboliths.  An intrauterine device (IUD) is in place.    External artifacts obscure detail.       Impression:       There is no significant interval change in the position of the right ureteral stent is   suggested radiographically since 8/18/2022.             COMMENT:  Part of this note is an electronic transcription of spoken language to printed text. The   electronic translation/transcription may permit erroneous, or at times, nonsensical (or even   sensical) words or phrases to be inadvertently transcribed or omitted; this  has   reviewed the note for such errors (as well as additional errors); however, some may still exist.     JAMI COLBERT JR, MD         Electronically Signed and Approved By: JAMI COLBERT JR, MD on 8/21/2022 at 1:54                               Labs Pending at Discharge:  Pending Labs     Order Current Status    Hepatitis Panel, Acute In process    Blood Culture - Blood, Arm, Left Preliminary result    Blood Culture - Blood, Arm, Left Preliminary result    Urine Culture - Urine, Urine, Clean Catch Preliminary result              Time spent on Discharge including face to face service: More than 30 minutes  Part of this note may be an electronic transcription/translation of spoken language to printed text using the Dragon Dictation System.     TElectronically signed by Rakesh Young MD, 08/22/22, 5:39 PM EDT.

## 2022-08-22 NOTE — PLAN OF CARE
Goal Outcome Evaluation:  Plan of Care Reviewed With: patient        Progress: improving  Outcome Evaluation: patient is alert and oriented this shift. medicated for pain with prn medication. will discharge home this shift

## 2022-08-22 NOTE — PLAN OF CARE
Goal Outcome Evaluation:  Plan of Care Reviewed With: patient        Progress: improving  Outcome Evaluation: pt aox4, Vs stable. Flank pain controlled with prn oxycodone. No new issues at this time. pt did require prn dilaudid for breakthrough pain

## 2022-08-23 ENCOUNTER — TRANSITIONAL CARE MANAGEMENT TELEPHONE ENCOUNTER (OUTPATIENT)
Dept: CALL CENTER | Facility: HOSPITAL | Age: 27
End: 2022-08-23

## 2022-08-23 ENCOUNTER — TELEPHONE (OUTPATIENT)
Dept: UROLOGY | Facility: CLINIC | Age: 27
End: 2022-08-23

## 2022-08-23 LAB — BACTERIA SPEC AEROBE CULT: NO GROWTH

## 2022-08-23 NOTE — OUTREACH NOTE
Call Center TCM Note    Flowsheet Row Responses   [facility] patient discharged from? [facility]   Does the patient have one of the following disease processes/diagnoses(primary or secondary)? Sepsis   TCM attempt successful? Yes   Call start time 0907   Call end time 0912   Discharge diagnosis sepsis d/t pyelonephritis, obstructing uropathy   Meds reviewed with patient/caregiver? Yes   Is the patient having any side effects they believe may be caused by any medication additions or changes? No   Does the patient have all medications related to this admission filled (includes all antibiotics, inhalers, nebulizers,steroids,etc.) Yes   Is the patient taking all medications as directed (includes completed medication regime)? Yes   Medication comments Taking ATBS and narcotics as ordered   Comments regarding appointments Dr. Jay 8/29/22@1000am   Does the patient have a primary care provider?  Yes   Comments regarding PCP Hospital PCP FOLLOW UP APPOINTMENT IS 8/24/22@730am   Does the patient have an appointment with their PCP within 7 days of discharge? Yes   Has the patient kept scheduled appointments due by today? N/A   Has home health visited the patient within 72 hours of discharge? N/A   Psychosocial issues? No   Did the patient receive a copy of their discharge instructions? Yes   Nursing interventions Reviewed instructions with patient   What is the patient's perception of their health status since discharge? Improving  [Patient doing better--still some back back when urinating at times, fever of 99.5-7 initially at discharge but has decreased to 98 ---pt drinking fluids as advised and aware of return precautions]   Nursing interventions Nurse provided patient education   Is the patient/caregiver able to teach back Sepsis? S - Shivering,fever or very cold, S - Sleepy, difficult to arouse,confused, S - Short of breath   Nursing interventions Nurse provided patient education, Advised patient to call provider   [patient plans on calling urology regarding quetions regarding timing of stent removal]   Is patient/caregiver able to teach back steps to recovery at home? Rest and regain strength   Is the patient/caregiver able to teach back signs and symptoms of worsening condition: Fever, Rapid heart rate (>90), Edema, Altered mental status(confusion/coma)   Is the patient/caregiver able to teach back the hierarchy of who to call/visit for symptoms/problems? PCP, Specialist, Home health nurse, Urgent Care, ED, 911 Yes   TCM call completed? Yes          Antonette Smith RN    8/23/2022, 09:16 EDT

## 2022-08-23 NOTE — TELEPHONE ENCOUNTER
----- Message from Shanta Jay MD sent at 8/22/2022 10:29 PM EDT -----  She has an appt on Wednesday but was readmitted with sepsis this past weekend.  Please move her appt to next week and instruct her that she needs to keep her stent in place until I see her next week.  Thanks.

## 2022-08-23 NOTE — TELEPHONE ENCOUNTER
Spoke with patient and appt changed per Dr. Jay's instructions. Patient agreed and appt changed to 8/29/22 at 10 am.

## 2022-08-23 NOTE — PROGRESS NOTES
Transitional Care Follow Up Visit  Jhonny Polanco is a 27 y.o. female who presents for a transitional care management visit.    Within 48 business hours after discharge our office contacted her via telephone to coordinate her care and needs.      I reviewed and discussed the details of that call along with the discharge summary, hospital problems, inpatient lab results, inpatient diagnostic studies, and consultation reports with Sherri.     Current outpatient and discharge medications have been reconciled for the patient.  Reviewed by: Najma Mckee MD      Date of TCM Phone Call 8/22/2022   Hospital [facility]   Date of Admission 8/20/2022   Date of Discharge 8/22/2022   Discharge Disposition Home or Self Care   · Obstructing uropathy on the right status post ureteral stent placement with string.  · Sepsis secondary to above.  Resolved      Risk for Readmission (LACE) Score: 7 (8/22/2022  6:01 AM)  Here with significant other.  Main c/o constipation-last night had BM and prior was 2-3 days prior--using stool softener-on oxycodone -last dose yesterday and 1-2  the day before-  Still with dysuria but has ureteral stent still in place - right flank pain and abdominal pain is slightly better rated at 6 out of 10.  Denies any fever at home, no nausea or vomiting.  Her Keflex on discharge was decreased to 500 mg 2 tablets daily    Has appt with Dr Ortiz 8/29/22- and was told can take stent out 8/28/22 at home.    History of Present Illness Sherri Clifford is a 27 y.o. female with a history of obstructing nephrolithiasis who underwent stenting on Thursday presents the hospital with abdominal pain and fever.  Pain is located in the right flank and right lower quadrant.  Pain does not radiate.  It is sharp and severe.  Patient had a ureteral stent placed on the right on Thursday.  She was discharged home and told to return if she developed fever or uncontrolled pain.  Instead of her pain getting  better it started actually getting gradually worse to where her oral pain medicine at home was not controlling her pain.  She then developed a fever of T-max 101 and she called the on-call urologist Dr. Ham who instructed her to come to the ER.  In the emergency department she was noted to have tachycardia as well as well as a significant urinary tract infection on urinalysis.  She has leukocytosis with a white blood cell count of 13.  We are asked to admit her for further inpatient management       Course During Hospital Stay The following information was reviewed by: Najma Mckee MD on 08/24/2022: Hospital Course:  Sherri Clifford is a 27 y.o. female with a history of obstructing nephrolithiasis who underwent stenting on Thursday presents the hospital with abdominal pain and fever.  Pain is located in the right flank and right lower quadrant.  Pain does not radiate.  It is sharp and severe.  Patient had a ureteral stent placed on the right on Thursday.  She was discharged home and told to return if she developed fever or uncontrolled pain.  Instead of her pain getting better it started actually getting gradually worse to where her oral pain medicine at home was not controlling her pain.  She then developed a fever of T-max 101 and she called the on-call urologist Dr. Ham who instructed her to come to the ER.  In the emergency department she was noted to have tachycardia as well as well as a significant urinary tract infection on urinalysis.  She has leukocytosis with a white blood cell count of 13.  We are asked to admit her for further inpatient management        Interval follow-up.  Vital signs stable.  On room air.  Leukocytosis resolved.  Elevated LFTs improved.  Cleared by urology to be released.  To keep the stent in until discussed with urology.  Urine culture pending.        The following portions of the patient's history were reviewed and updated as appropriate: allergies,  "current medications, past family history, past medical history, past social history, past surgical history and problem list.     Vitals:    08/24/22 0737   BP: 132/83   BP Location: Left arm   Patient Position: Sitting   Cuff Size: Large Adult   Pulse: 93   Temp: 99.3 °F (37.4 °C)   TempSrc: Temporal   SpO2: 97%   Weight: 90.1 kg (198 lb 9.6 oz)   Height: 162.6 cm (64\")       Review of Systems-dysuria and right flank pain abdominal pain    Objective   Physical Exam  Vitals and nursing note reviewed.   Constitutional:       Appearance: Normal appearance.   HENT:      Head: Normocephalic and atraumatic.   Cardiovascular:      Rate and Rhythm: Normal rate and regular rhythm.   Pulmonary:      Effort: Pulmonary effort is normal.      Breath sounds: Normal breath sounds.   Abdominal:      General: Abdomen is flat.      Palpations: Abdomen is soft.      Tenderness: There is abdominal tenderness. There is no guarding.   Musculoskeletal:      Cervical back: Normal range of motion and neck supple.      Comments: Tender right flank area   Neurological:      General: No focal deficit present.      Mental Status: She is alert and oriented to person, place, and time.           Assessment & Plan     Diagnoses and all orders for this visit:    1. Uropathy, obstructive (Primary)  Assessment & Plan:  Ureteral stent still in place.  Had monocytosis in the hospital CBC with white count of 15,000.  No fevers at home pain is down to 6 out of 10.  Will increase Keflex to 5 mg 1 tablet 3 times a day until she sees Dr. Ortiz her urologist in 5 days.  If symptoms worsen with increased f pain nausea vomiting then needs to inform her urologist and or go to the emergency room.      2. Drug-induced constipation  Assessment & Plan:  Likely exacerbated by oxycodone which she takes for pain.  Drink lots of fluids at least 7 to 8 glasses of water daily.  Can use MiraLAX if unable to do this.  Take stool softener 100 mg 2 tablets daily which she " has at home.  Decrease dose if diarrhea occurs.  Increase fiber in diet.      3. Retained ureteral stent    4. Ureteral stone    5. Primary hypertension  Comments:  not taking amlodipine 2.5 mg for past 2-3 months--  Assessment & Plan:  Off amlodipine 2.5 mg due to current treatment for ureteral stone currently with stent.  Pain is 6 out of 10.  Can stay off amlodipine 2.5 mg at this time-was on it only for about a month.  Monitor blood pressure and record goal is average 120/80.  If blood pressure is greater than 140/80 then resume amlodipine 2.5 mg follow-up in 4 weeks with blood pressure log.  And labs prior.    Orders:  -     Comprehensive Metabolic Panel; Future  -     Lipid Panel; Future  -     CBC & Differential; Future  -     Vitamin B12; Future  -     Folate; Future  -     Vitamin D 25 Hydroxy; Future  -     Magnesium; Future  -     Microalbumin / Creatinine Urine Ratio - Urine, Clean Catch; Future  -     Hemoglobin A1c; Future  -     TSH+Free T4; Future  -     T3, Free; Future    6. Leukocytosis, unspecified type  Comments:  resolved onD/C from hospital    7. Hyperglycemia  -     Hemoglobin A1c; Future    8. Other fatigue  -     Vitamin D 25 Hydroxy; Future      Follow Up {    Return in about 4 weeks (around 9/21/2022), or if symptoms worsen or fail to improve, for Recheck.

## 2022-08-23 NOTE — OUTREACH NOTE
Prep Survey    Flowsheet Row Responses   [facility] facility patient discharged from? [facility]   Is LACE score < 7 ? No   Emergency Room discharge w/ pulse ox? No   Eligibility [facility]   Hospital [facility]   Date of Admission 08/20/22   Date of Discharge 08/22/22   Discharge Disposition Home or Self Care   Discharge diagnosis sepsis d/t pyelonephritis, obstructing uropathy   Does the patient have one of the following disease processes/diagnoses(primary or secondary)? Sepsis   Does the patient have Home health ordered? No   Is there a DME ordered? No   Prep survey completed? Yes          EBONY SANCHEZ - Registered Nurse

## 2022-08-24 ENCOUNTER — OFFICE VISIT (OUTPATIENT)
Dept: INTERNAL MEDICINE | Facility: CLINIC | Age: 27
End: 2022-08-24

## 2022-08-24 VITALS
HEIGHT: 64 IN | BODY MASS INDEX: 33.9 KG/M2 | OXYGEN SATURATION: 97 % | WEIGHT: 198.6 LBS | TEMPERATURE: 99.3 F | SYSTOLIC BLOOD PRESSURE: 132 MMHG | DIASTOLIC BLOOD PRESSURE: 83 MMHG | HEART RATE: 93 BPM

## 2022-08-24 DIAGNOSIS — N13.9 UROPATHY, OBSTRUCTIVE: Primary | ICD-10-CM

## 2022-08-24 DIAGNOSIS — N20.1 URETERAL STONE: ICD-10-CM

## 2022-08-24 DIAGNOSIS — I10 PRIMARY HYPERTENSION: ICD-10-CM

## 2022-08-24 DIAGNOSIS — D72.829 LEUKOCYTOSIS, UNSPECIFIED TYPE: ICD-10-CM

## 2022-08-24 DIAGNOSIS — R53.83 OTHER FATIGUE: ICD-10-CM

## 2022-08-24 DIAGNOSIS — Z96.0 RETAINED URETERAL STENT: ICD-10-CM

## 2022-08-24 DIAGNOSIS — K59.03 DRUG-INDUCED CONSTIPATION: ICD-10-CM

## 2022-08-24 DIAGNOSIS — R73.9 HYPERGLYCEMIA: ICD-10-CM

## 2022-08-24 PROCEDURE — 99496 TRANSJ CARE MGMT HIGH F2F 7D: CPT | Performed by: INTERNAL MEDICINE

## 2022-08-24 NOTE — ASSESSMENT & PLAN NOTE
Off amlodipine 2.5 mg due to current treatment for ureteral stone currently with stent.  Pain is 6 out of 10.  Can stay off amlodipine 2.5 mg at this time-was on it only for about a month.  Monitor blood pressure and record goal is average 120/80.  If blood pressure is greater than 140/80 then resume amlodipine 2.5 mg follow-up in 4 weeks with blood pressure log.  And labs prior.

## 2022-08-24 NOTE — PATIENT INSTRUCTIONS
Increase keflex to 500 mg TID x 7-10 days until sees Dr Ortiz  Work excuse for today -RTW- Tuesday 8/20/22

## 2022-08-24 NOTE — ASSESSMENT & PLAN NOTE
Likely exacerbated by oxycodone which she takes for pain.  Drink lots of fluids at least 7 to 8 glasses of water daily.  Can use MiraLAX if unable to do this.  Take stool softener 100 mg 2 tablets daily which she has at home.  Decrease dose if diarrhea occurs.  Increase fiber in diet.

## 2022-08-24 NOTE — ASSESSMENT & PLAN NOTE
Ureteral stent still in place.  Had monocytosis in the hospital CBC with white count of 15,000.  No fevers at home pain is down to 6 out of 10.  Will increase Keflex to 5 mg 1 tablet 3 times a day until she sees Dr. Ortiz her urologist in 5 days.  If symptoms worsen with increased f pain nausea vomiting then needs to inform her urologist and or go to the emergency room.

## 2022-08-26 ENCOUNTER — TELEPHONE (OUTPATIENT)
Dept: UROLOGY | Facility: CLINIC | Age: 27
End: 2022-08-26

## 2022-08-26 LAB
BACTERIA SPEC AEROBE CULT: NORMAL
BACTERIA SPEC AEROBE CULT: NORMAL
CALCIUM OXALATE DIHYDRATE MFR STONE IR: 60 %
COLOR STONE: NORMAL
COM MFR STONE: 20 %
COMPN STONE: NORMAL
HYDROXYAPATITE 24H ENGDIFF UR: 20 %
LABORATORY COMMENT REPORT: NORMAL
Lab: NORMAL
Lab: NORMAL
PHOTO: NORMAL
SIZE STONE: NORMAL MM
SPEC SOURCE SUBJ: NORMAL
WT STONE: 39 MG

## 2022-08-26 NOTE — TELEPHONE ENCOUNTER
Spoke with PT and informed her that per Dr. Jay's previous instruction that she needs to leave the stent in place until Dr. Jay sees her on Monday. PT voiced understanding.

## 2022-08-26 NOTE — TELEPHONE ENCOUNTER
Pt had surgery on 8/18 with stent placed. Ended up back in the hospital with pyelonephritis. She has an appt to see Dr. Jay on Monday and does not know if she is supposed to pull the stent before coming in or not. She can't find the info in her discharge papers.

## 2022-08-29 ENCOUNTER — OFFICE VISIT (OUTPATIENT)
Dept: UROLOGY | Facility: CLINIC | Age: 27
End: 2022-08-29

## 2022-08-29 VITALS — BODY MASS INDEX: 33.9 KG/M2 | WEIGHT: 198.6 LBS | HEIGHT: 64 IN

## 2022-08-29 DIAGNOSIS — N20.0 KIDNEY STONE: Primary | ICD-10-CM

## 2022-08-29 LAB
BILIRUB BLD-MCNC: NEGATIVE MG/DL
CLARITY, POC: ABNORMAL
COLOR UR: ABNORMAL
EXPIRATION DATE: 823
GLUCOSE UR STRIP-MCNC: NEGATIVE MG/DL
KETONES UR QL: NEGATIVE
LEUKOCYTE EST, POC: ABNORMAL
Lab: ABNORMAL
NITRITE UR-MCNC: NEGATIVE MG/ML
PH UR: 6.5 [PH] (ref 5–8)
PROT UR STRIP-MCNC: NEGATIVE MG/DL
RBC # UR STRIP: ABNORMAL /UL
SP GR UR: 1.01 (ref 1–1.03)
UROBILINOGEN UR QL: ABNORMAL

## 2022-08-29 PROCEDURE — 81003 URINALYSIS AUTO W/O SCOPE: CPT | Performed by: UROLOGY

## 2022-08-29 PROCEDURE — 99212 OFFICE O/P EST SF 10 MIN: CPT | Performed by: UROLOGY

## 2022-08-29 NOTE — PROGRESS NOTES
"Chief Complaint  No chief complaint on file.    Subjective          Shreri Clifford presents to [facility] UROLOGY  History of Present Illness     Miss Clifford is here for follow-up after ureteroscopy. She did have sepsis, although her culture was negative postoperatively, was readmitted and has finished her antibiotics, was close to finishing at this point. Her stent has been in for 12 days.    The patient reports she is doing well. She states that she is ready for the stent to come out. She reports that she went to her PCP on 08/26/2022 and she was still having a fever. She states that she has been on antibiotics and her PCP increased it to 3 times daily. The patient reports that today would be her 7th day of 3 times daily taking the antibiotics. She states that she is only using the prescriptions that she had originally. She reports that she has approximately 3 days left of the antibiotics.    Objective   Vital Signs:   Ht 162.6 cm (64\")   Wt 90.1 kg (198 lb 9.6 oz)   BMI 34.09 kg/m²       Physical Exam  Vitals and nursing note reviewed.   Constitutional:       Appearance: Normal appearance. She is well-developed.   Pulmonary:      Effort: Pulmonary effort is normal.      Breath sounds: Normal air entry.   Neurological:      Mental Status: She is alert and oriented to person, place, and time.      Motor: Motor function is intact.   Psychiatric:         Mood and Affect: Mood normal.         Behavior: Behavior normal.          Result Review :   The following data was reviewed by: Shanta Jay MD on 08/29/2022:    Results for orders placed or performed in visit on 08/29/22   POC Urinalysis Dipstick, Automated    Specimen: Urine   Result Value Ref Range    Color Other (A) Yellow, Straw, Dark Yellow, Joy    Clarity, UA Slightly Cloudy (A) Clear    Specific Gravity  1.010 1.005 - 1.030    pH, Urine 6.5 5.0 - 8.0    Leukocytes Small (1+) (A) Negative    Nitrite, UA Negative Negative    " Protein, POC Negative Negative mg/dL    Glucose, UA Negative Negative mg/dL    Ketones, UA Negative Negative    Urobilinogen, UA 0.2 E.U./dL Normal, 0.2 E.U./dL    Bilirubin Negative Negative    Blood, UA Large (A) Negative    Lot Number [id number]     Expiration Date 823             Assessment and Plan    Diagnoses and all orders for this visit:    1. Kidney stone (Primary)  Assessment & Plan:  Her stent was removed today without any issue in the office. She will follow up in 1 year with KUB prior or sooner if needed. She should have about 3 days worth of antibiotics. If she does not have that, she will let us know and otherwise we will see her in a year.    Orders:  -     POC Urinalysis Dipstick, Automated  -     XR Abdomen KUB; Future    Transcribed from ambient dictation for Shanta Jay MD by SABAS BARBOSA.  08/29/22   12:35 EDT    Patient verbalized consent to the visit recording.          Follow Up       No follow-ups on file.  Patient was given instructions and counseling regarding her condition or for health maintenance advice. Please see specific information pulled into the AVS if appropriate.

## 2022-08-29 NOTE — ASSESSMENT & PLAN NOTE
Her stent was removed today without any issue in the office. She will follow up in 1 year with KUB prior or sooner if needed. She should have about 3 days worth of antibiotics. If she does not have that, she will let us know and otherwise we will see her in a year.

## 2022-08-31 ENCOUNTER — READMISSION MANAGEMENT (OUTPATIENT)
Dept: CALL CENTER | Facility: HOSPITAL | Age: 27
End: 2022-08-31

## 2022-08-31 NOTE — OUTREACH NOTE
Sepsis Week 2 Survey    Flowsheet Row Responses   [facility] facility patient discharged from? [facility]   Does the patient have one of the following disease processes/diagnoses(primary or secondary)? Sepsis   Week 2 attempt successful? No   Unsuccessful attempts Attempt 1          VIKTORIA PORTILLO - Licensed Nurse

## 2022-09-06 ENCOUNTER — READMISSION MANAGEMENT (OUTPATIENT)
Dept: CALL CENTER | Facility: HOSPITAL | Age: 27
End: 2022-09-06

## 2022-09-06 NOTE — OUTREACH NOTE
Sepsis Week 2 Survey    Flowsheet Row Responses   [facility] facility patient discharged from? [facility]   Does the patient have one of the following disease processes/diagnoses(primary or secondary)? Sepsis   Week 2 attempt successful? No   Unsuccessful attempts Attempt 2  [Both numbers attempted-no answer]          NERY H - Registered Nurse

## 2022-09-16 ENCOUNTER — LAB (OUTPATIENT)
Dept: LAB | Facility: HOSPITAL | Age: 27
End: 2022-09-16

## 2022-09-16 DIAGNOSIS — R53.83 OTHER FATIGUE: ICD-10-CM

## 2022-09-16 DIAGNOSIS — R73.9 HYPERGLYCEMIA: ICD-10-CM

## 2022-09-16 DIAGNOSIS — I10 PRIMARY HYPERTENSION: ICD-10-CM

## 2022-09-16 LAB
25(OH)D3 SERPL-MCNC: 26.4 NG/ML (ref 30–100)
ALBUMIN SERPL-MCNC: 4.5 G/DL (ref 3.5–5.2)
ALBUMIN UR-MCNC: <1.2 MG/DL
ALBUMIN/GLOB SERPL: 1.9 G/DL
ALP SERPL-CCNC: 71 U/L (ref 39–117)
ALT SERPL W P-5'-P-CCNC: 15 U/L (ref 1–33)
ANION GAP SERPL CALCULATED.3IONS-SCNC: 12 MMOL/L (ref 5–15)
AST SERPL-CCNC: 16 U/L (ref 1–32)
BASOPHILS # BLD AUTO: 0.02 10*3/MM3 (ref 0–0.2)
BASOPHILS NFR BLD AUTO: 0.5 % (ref 0–1.5)
BILIRUB SERPL-MCNC: 0.6 MG/DL (ref 0–1.2)
BUN SERPL-MCNC: 12 MG/DL (ref 6–20)
BUN/CREAT SERPL: 15.4 (ref 7–25)
CALCIUM SPEC-SCNC: 9.3 MG/DL (ref 8.6–10.5)
CHLORIDE SERPL-SCNC: 104 MMOL/L (ref 98–107)
CHOLEST SERPL-MCNC: 128 MG/DL (ref 0–200)
CO2 SERPL-SCNC: 21 MMOL/L (ref 22–29)
CREAT SERPL-MCNC: 0.78 MG/DL (ref 0.57–1)
CREAT UR-MCNC: 181.3 MG/DL
DEPRECATED RDW RBC AUTO: 40.6 FL (ref 37–54)
EGFRCR SERPLBLD CKD-EPI 2021: 106.3 ML/MIN/1.73
EOSINOPHIL # BLD AUTO: 0.04 10*3/MM3 (ref 0–0.4)
EOSINOPHIL NFR BLD AUTO: 0.9 % (ref 0.3–6.2)
ERYTHROCYTE [DISTWIDTH] IN BLOOD BY AUTOMATED COUNT: 12.3 % (ref 12.3–15.4)
FOLATE SERPL-MCNC: 11.6 NG/ML (ref 4.78–24.2)
GLOBULIN UR ELPH-MCNC: 2.4 GM/DL
GLUCOSE SERPL-MCNC: 94 MG/DL (ref 65–99)
HBA1C MFR BLD: 4.8 % (ref 4.8–5.6)
HCT VFR BLD AUTO: 36.9 % (ref 34–46.6)
HDLC SERPL-MCNC: 38 MG/DL (ref 40–60)
HGB BLD-MCNC: 12.4 G/DL (ref 12–15.9)
IMM GRANULOCYTES # BLD AUTO: 0.01 10*3/MM3 (ref 0–0.05)
IMM GRANULOCYTES NFR BLD AUTO: 0.2 % (ref 0–0.5)
LDLC SERPL CALC-MCNC: 78 MG/DL (ref 0–100)
LDLC/HDLC SERPL: 2.07 {RATIO}
LYMPHOCYTES # BLD AUTO: 1.02 10*3/MM3 (ref 0.7–3.1)
LYMPHOCYTES NFR BLD AUTO: 23.7 % (ref 19.6–45.3)
MAGNESIUM SERPL-MCNC: 2.9 MG/DL (ref 1.6–2.6)
MCH RBC QN AUTO: 30.6 PG (ref 26.6–33)
MCHC RBC AUTO-ENTMCNC: 33.6 G/DL (ref 31.5–35.7)
MCV RBC AUTO: 91.1 FL (ref 79–97)
MICROALBUMIN/CREAT UR: NORMAL MG/G{CREAT}
MONOCYTES # BLD AUTO: 0.28 10*3/MM3 (ref 0.1–0.9)
MONOCYTES NFR BLD AUTO: 6.5 % (ref 5–12)
NEUTROPHILS NFR BLD AUTO: 2.93 10*3/MM3 (ref 1.7–7)
NEUTROPHILS NFR BLD AUTO: 68.2 % (ref 42.7–76)
NRBC BLD AUTO-RTO: 0 /100 WBC (ref 0–0.2)
PLATELET # BLD AUTO: 232 10*3/MM3 (ref 140–450)
PMV BLD AUTO: 9.8 FL (ref 6–12)
POTASSIUM SERPL-SCNC: 3.9 MMOL/L (ref 3.5–5.2)
PROT SERPL-MCNC: 6.9 G/DL (ref 6–8.5)
RBC # BLD AUTO: 4.05 10*6/MM3 (ref 3.77–5.28)
SODIUM SERPL-SCNC: 137 MMOL/L (ref 136–145)
T3FREE SERPL-MCNC: 3.59 PG/ML (ref 2–4.4)
T4 FREE SERPL-MCNC: 1.16 NG/DL (ref 0.93–1.7)
TRIGL SERPL-MCNC: 56 MG/DL (ref 0–150)
TSH SERPL DL<=0.05 MIU/L-ACNC: 1.45 UIU/ML (ref 0.27–4.2)
VIT B12 BLD-MCNC: 428 PG/ML (ref 211–946)
VLDLC SERPL-MCNC: 12 MG/DL (ref 5–40)
WBC NRBC COR # BLD: 4.3 10*3/MM3 (ref 3.4–10.8)

## 2022-09-16 PROCEDURE — 84439 ASSAY OF FREE THYROXINE: CPT

## 2022-09-16 PROCEDURE — 82746 ASSAY OF FOLIC ACID SERUM: CPT

## 2022-09-16 PROCEDURE — 82306 VITAMIN D 25 HYDROXY: CPT

## 2022-09-16 PROCEDURE — 36415 COLL VENOUS BLD VENIPUNCTURE: CPT

## 2022-09-16 PROCEDURE — 83036 HEMOGLOBIN GLYCOSYLATED A1C: CPT

## 2022-09-16 PROCEDURE — 83735 ASSAY OF MAGNESIUM: CPT

## 2022-09-16 PROCEDURE — 82043 UR ALBUMIN QUANTITATIVE: CPT

## 2022-09-16 PROCEDURE — 82570 ASSAY OF URINE CREATININE: CPT

## 2022-09-16 PROCEDURE — 84481 FREE ASSAY (FT-3): CPT

## 2022-09-16 PROCEDURE — 80050 GENERAL HEALTH PANEL: CPT

## 2022-09-16 PROCEDURE — 82607 VITAMIN B-12: CPT

## 2022-09-16 PROCEDURE — 80061 LIPID PANEL: CPT

## 2022-09-19 ENCOUNTER — OFFICE VISIT (OUTPATIENT)
Dept: INTERNAL MEDICINE | Facility: CLINIC | Age: 27
End: 2022-09-19

## 2022-09-19 VITALS
TEMPERATURE: 97.5 F | WEIGHT: 197 LBS | RESPIRATION RATE: 14 BRPM | BODY MASS INDEX: 33.63 KG/M2 | SYSTOLIC BLOOD PRESSURE: 110 MMHG | OXYGEN SATURATION: 98 % | DIASTOLIC BLOOD PRESSURE: 90 MMHG | HEIGHT: 64 IN | HEART RATE: 70 BPM

## 2022-09-19 DIAGNOSIS — N13.9 UROPATHY, OBSTRUCTIVE: ICD-10-CM

## 2022-09-19 DIAGNOSIS — E55.9 VITAMIN D DEFICIENCY: ICD-10-CM

## 2022-09-19 DIAGNOSIS — I10 PRIMARY HYPERTENSION: Primary | ICD-10-CM

## 2022-09-19 PROCEDURE — 99213 OFFICE O/P EST LOW 20 MIN: CPT | Performed by: INTERNAL MEDICINE

## 2022-09-19 NOTE — PROGRESS NOTES
"CHIEF COMPLAINT  Sherri Clifford presents to [facility] INTER[facility] MED[facility]INE for follow-up of Hypertension.    HPI  28 yo pleasant female with obstructive uropathy s/p stent-recently removed -due to stone, HTN - not on meds  here for f/u    Meds-on supa--has appt with gyn this week    PFSH reviewed.  ROS-no ha or blurred vision    SH-works at [employer]-    OBJECTIVE  Vital Signs  Vitals:    09/19/22 1514 09/19/22 1603   BP: 127/83 110/90   BP Location: Right arm Left arm   Patient Position: Sitting Sitting   Cuff Size: Adult Adult   Pulse: 70    Resp: 14    Temp: 97.5 °F (36.4 °C)    SpO2: 98%    Weight: 89.4 kg (197 lb)    Height: 162.6 cm (64.02\")       Body mass index is 33.8 kg/m².    Physical Exam  Vitals and nursing note reviewed.   Constitutional:       Appearance: Normal appearance.   HENT:      Head: Normocephalic and atraumatic.   Cardiovascular:      Rate and Rhythm: Normal rate and regular rhythm.   Pulmonary:      Effort: Pulmonary effort is normal.      Breath sounds: Normal breath sounds.   Abdominal:      Palpations: Abdomen is soft.   Musculoskeletal:      Cervical back: Normal range of motion and neck supple.   Neurological:      General: No focal deficit present.      Mental Status: She is alert and oriented to person, place, and time.          RESULTS REVIEW  No results found for: PROBNP, BNP  CMP    CMP 8/21/22 8/22/22 9/16/22   Glucose 125 (A) 102 (A) 94   BUN 5 (A) 4 (A) 12   Creatinine 0.82 0.69 0.78   Sodium 137 138 137   Potassium 3.4 (A) 3.7 3.9   Chloride 106 106 104   Calcium 8.4 (A) 8.5 (A) 9.3   Albumin  3.40 (A) 4.50   Total Bilirubin  0.5 0.6   Alkaline Phosphatase  98 71   AST (SGOT)  28 16   ALT (SGPT)  95 (A) 15   (A) Abnormal value            CBC w/diff    CBC w/Diff 8/21/22 8/22/22 9/16/22   WBC 11.37 (A) 7.60 4.30   RBC 3.62 (A) 3.54 (A) 4.05   Hemoglobin 11.3 (A) 11.0 (A) 12.4   Hematocrit 33.2 (A) 32.1 (A) 36.9   MCV 91.7 90.7 91.1   MCH 31.2 31.1 " 30.6   MCHC 34.0 34.3 33.6   RDW 12.6 12.2 (A) 12.3   Platelets 181 189 232   Neutrophil Rel % 85.5 (A) 78.3 (A) 68.2   Immature Granulocyte Rel % 0.4 0.1 0.2   Lymphocyte Rel % 9.7 (A) 16.3 (A) 23.7   Monocyte Rel % 4.0 (A) 4.5 (A) 6.5   Eosinophil Rel % 0.2 (A) 0.7 0.9   Basophil Rel % 0.2 0.1 0.5   (A) Abnormal value             Lipid Panel    Lipid Panel 6/23/22 9/16/22   Total Cholesterol 137 128   Triglycerides 45 56   HDL Cholesterol 49 38 (A)   VLDL Cholesterol 10 12   LDL Cholesterol  78 78   LDL/HDL Ratio 1.61 2.07   (A) Abnormal value             Lab Results   Component Value Date    TSH 1.450 09/16/2022    TSH 1.240 06/23/2022      Lab Results   Component Value Date    FREET4 1.16 09/16/2022    FREET4 1.18 06/23/2022      A1C Last 3 Results    HGBA1C Last 3 Results 9/16/22   Hemoglobin A1C 4.80            Lab Results   Component Value Date    VICXTNDT52 428 09/16/2022    PIHH35FH 26.4 (L) 09/16/2022    MG 2.9 (H) 09/16/2022        XR Foot 3+ View Left    Result Date: 6/20/2022    1. Nondisplaced intra-articular fracture of the 1st proximal phalanx      Jaspreet Walker M.D.       Electronically Signed and Approved By: Jaspreet Walker M.D. on 6/20/2022 at 14:35             US Non-ob Transvaginal    Result Date: 6/29/2022   1.8 cm left ovarian cyst which either represents involution of previously demonstrated cyst, or a new dominant follicle.  In either case, no further imaging follow-up is necessary     ELLIOT LEVI MD       Electronically Signed and Approved By: ELLIOT LEVI MD on 6/29/2022 at 13:45             FL < 1 Hour    Result Date: 8/19/2022   Stone retrieval from right UPJ and right ureteral stent placement.     LEATHA COLORADO MD       Electronically Signed and Approved By: LEATHA COLORADO MD on 8/19/2022 at 3:00             US Gallbladder    Result Date: 8/21/2022    The patient has undergone cholecystectomy.  No biliary ductal dilatation.  The other findings are as detailed above.    COMMENT:   Part of this note is an electronic transcription of spoken language to printed text. The electronic translation/transcription may permit erroneous, or at times, nonsensical (or even sensical) words or phrases to be inadvertently transcribed or omitted; this  has reviewed the note for such errors (as well as additional errors); however, some may still exist.  JAMI COLBERT JR, MD       Electronically Signed and Approved By: JAMI COLBERT JR, MD on 8/21/2022 at 2:48              CT Abdomen Pelvis Stone Protocol    Result Date: 8/14/2022    1. 6 mm calculus in the proximal right ureter with mild right hydronephrosis.  2. Punctate left renal calculus.     MARLA FITZPATRICK MD       Electronically Signed and Approved By: MARLA FITZPATRICK MD on 8/14/2022 at 12:44             XR Abdomen KUB    Result Date: 8/21/2022   There is no significant interval change in the position of the right ureteral stent is suggested radiographically since 8/18/2022.     COMMENT:  Part of this note is an electronic transcription of spoken language to printed text. The electronic translation/transcription may permit erroneous, or at times, nonsensical (or even sensical) words or phrases to be inadvertently transcribed or omitted; this  has reviewed the note for such errors (as well as additional errors); however, some may still exist.  JAMI COLBERT JR, MD       Electronically Signed and Approved By: JAMI COLBERT JR, MD on 8/21/2022 at 1:54              XR Abdomen KUB    Result Date: 8/19/2022   Stone retrieval from right UPJ and right ureteral stent placement.     LEATHA COLORADO MD       Electronically Signed and Approved By: LEATHA COLORADO MD on 8/19/2022 at 3:00                        ASSESSMENT & PLAN  Diagnoses and all orders for this visit:    1. Primary hypertension (Primary)  Comments:  Monitor blood pressure 3-4 times a week and record goal is less than 140/90 follow a low-cholesterol low-salt diet-ideal pressure  equals 120/80.   Assessment & Plan:  On no meds continue to follow low-salt diet exercise lose 5 to 10 pounds follow-up in 4 months-record blood pressure 3-4 times a week at home or at work-[employer].  Goal is 120/80.    Orders:  -     Comprehensive Metabolic Panel; Future  -     Lipid Panel; Future  -     TSH+Free T4; Future  -     T3, Free; Future  -     Vitamin B12; Future  -     Folate; Future  -     Magnesium; Future  -     Vitamin D 25 Hydroxy; Future  -     CBC & Differential; Future    2. Uropathy, obstructive  Comments:  Stent removed-symptoms resolved  Orders:  -     Comprehensive Metabolic Panel; Future  -     Lipid Panel; Future  -     TSH+Free T4; Future  -     T3, Free; Future  -     Vitamin B12; Future  -     Folate; Future  -     Magnesium; Future  -     Vitamin D 25 Hydroxy; Future  -     CBC & Differential; Future    3. Vitamin D deficiency  Comments:  Levels are less than 30 start vitamin D 2000 units daily recheck in 3 to 4 months.  Orders:  -     Vitamin D 25 Hydroxy; Future        FOLLOW UP  Return in about 4 months (around 1/19/2023) for Recheck.    Patient was given instructions and counseling regarding her condition or for health maintenance advice. Please see specific information pulled into the AVS if appropriate.

## 2022-09-19 NOTE — ASSESSMENT & PLAN NOTE
On no meds continue to follow low-salt diet exercise lose 5 to 10 pounds follow-up in 4 months-record blood pressure 3-4 times a week at home or at work-[employer].  Goal is 120/80.

## 2022-09-23 ENCOUNTER — OFFICE VISIT (OUTPATIENT)
Dept: OBSTETRICS AND GYNECOLOGY | Facility: CLINIC | Age: 27
End: 2022-09-23

## 2022-09-23 VITALS
BODY MASS INDEX: 33.97 KG/M2 | WEIGHT: 199 LBS | SYSTOLIC BLOOD PRESSURE: 122 MMHG | HEIGHT: 64 IN | HEART RATE: 75 BPM | DIASTOLIC BLOOD PRESSURE: 87 MMHG

## 2022-09-23 DIAGNOSIS — N93.8 DUB (DYSFUNCTIONAL UTERINE BLEEDING): Primary | ICD-10-CM

## 2022-09-23 PROCEDURE — 99213 OFFICE O/P EST LOW 20 MIN: CPT | Performed by: STUDENT IN AN ORGANIZED HEALTH CARE EDUCATION/TRAINING PROGRAM

## 2022-09-23 RX ORDER — MEDROXYPROGESTERONE ACETATE 10 MG/1
10 TABLET ORAL DAILY
Qty: 10 TABLET | Refills: 0 | Status: SHIPPED | OUTPATIENT
Start: 2022-09-23 | End: 2022-11-28

## 2022-09-23 NOTE — PROGRESS NOTES
"GYN Problem/Follow Up Visit    Chief Complaint   Patient presents with   • Follow-up           HPI  Sherri Clifford is a 27 y.o. female, , who presents for follow-up from ultrasound in .     LMP: 2022  Discharged from hospital:  --> bleeding started day discharge; bled for one week felt that this was period. No irritation or odor. Week of 29 spotting everyday  until approximately a couple of days ago. IUD placed 5 years next spring.  Would like to keep IUD in place.  Reports that she has had been seen by hematologist and was told to be taken folate and baby aspirin.  Also reports that menstrual pain is different from the pain that she is experiencing with her kidney issues.  Is getting  this coming October.  Would like to leave IUD in place and then have IUD removed for potential pregnancy.      Additional OB/GYN History   No LMP recorded. Patient has had an implant.  Current contraception: contraceptive methods: IUD.   Desires to: continue contraception  Allergies : Patient has no known allergies.     The additional following portions of the patient's history were reviewed and updated as appropriate: allergies, current medications, past family history, past medical history, past social history, past surgical history and problem list.    Review of Systems   Constitutional: Negative for fatigue and fever.   Respiratory: Negative for cough and shortness of breath.    Cardiovascular: Negative for chest pain.   Gastrointestinal: Negative for abdominal distention and abdominal pain.   Genitourinary: Positive for menstrual problem and pelvic pain. Negative for difficulty urinating, dysuria and vaginal discharge.       I have reviewed and agree with the HPI, ROS, and historical information as entered above. Adonis Silva MD    Objective   /87   Pulse 75   Ht 162.6 cm (64.02\")   Wt 90.3 kg (199 lb)   BMI 34.14 kg/m²     Physical Exam  Vitals and nursing note reviewed. "   Constitutional:       General: She is not in acute distress.     Appearance: Normal appearance. She is not toxic-appearing.   HENT:      Head: Normocephalic and atraumatic.   Eyes:      Extraocular Movements: Extraocular movements intact.      Conjunctiva/sclera: Conjunctivae normal.   Cardiovascular:      Pulses: Normal pulses.   Pulmonary:      Effort: Pulmonary effort is normal.   Abdominal:      General: There is no distension.      Palpations: Abdomen is soft.      Tenderness: There is no abdominal tenderness.   Musculoskeletal:      Cervical back: Normal range of motion.   Skin:     General: Skin is warm and dry.   Neurological:      Mental Status: She is alert and oriented to person, place, and time.   Psychiatric:         Mood and Affect: Mood normal.         Behavior: Behavior normal.         Thought Content: Thought content normal.            Assessment and Plan  Sherri Clifford is a 27 y.o.  presenting for follow-up for ultrasound performed in .  That ultrasound demonstrated a normal pelvic ultrasound.  Patient had 9 hospitalization for kidney stones which were requested and going septic.  Patient concerns today with continued irregular menstruations with IUD in place.  Does not want to have IUD removed.  Recommendation for a 10-day course of Provera an episode of bleeding to stabilize lining.  Reconvene in November to have IUD potentially removed as patient desires pregnancy.    Diagnoses and all orders for this visit:    1. DUB (dysfunctional uterine bleeding) (Primary)  -     medroxyPROGESTERone (Provera) 10 MG tablet; Take 1 tablet by mouth Daily.  Dispense: 10 tablet; Refill: 0        She understands the importance of having the above orders performed in a timely fashion.  The risks of not performing them include, but are not limited to, cancer and/or subsequent increase in morbidity and/or mortality.  She is encouraged to review her results online and/or contact or office if she has  questions.     Follow Up:  Return in about 8 weeks (around 11/18/2022) for Next scheduled follow up.    Adonis Silva MD  09/23/2022

## 2022-11-28 ENCOUNTER — OFFICE VISIT (OUTPATIENT)
Dept: OBSTETRICS AND GYNECOLOGY | Facility: CLINIC | Age: 27
End: 2022-11-28

## 2022-11-28 VITALS
DIASTOLIC BLOOD PRESSURE: 79 MMHG | WEIGHT: 203 LBS | HEART RATE: 86 BPM | SYSTOLIC BLOOD PRESSURE: 118 MMHG | BODY MASS INDEX: 34.66 KG/M2 | HEIGHT: 64 IN

## 2022-11-28 DIAGNOSIS — Z30.432 ENCOUNTER FOR IUD REMOVAL: Primary | ICD-10-CM

## 2022-11-28 PROCEDURE — 58301 REMOVE INTRAUTERINE DEVICE: CPT | Performed by: NURSE PRACTITIONER

## 2022-11-28 RX ORDER — PRENATAL WITH FERROUS FUM AND FOLIC ACID 3080; 920; 120; 400; 22; 1.84; 3; 20; 10; 1; 12; 200; 27; 25; 2 [IU]/1; [IU]/1; MG/1; [IU]/1; MG/1; MG/1; MG/1; MG/1; MG/1; MG/1; UG/1; MG/1; MG/1; MG/1; MG/1
1 TABLET ORAL DAILY
Qty: 90 TABLET | Refills: 3 | Status: SHIPPED | OUTPATIENT
Start: 2022-11-28 | End: 2023-02-26

## 2022-11-28 RX ORDER — CLINDAMYCIN PHOSPHATE 11.9 MG/ML
SOLUTION TOPICAL
COMMUNITY
Start: 2022-11-21

## 2022-11-28 NOTE — PROGRESS NOTES
IUD Removal Procedure Note    Type of IUD:  Mirena  Date of insertion:  known  Reason for removal:  Desires pregnancy  Other relevant history/information:  Has significant cramping all the time    Procedure Time Documentation  The risks of the procedure were reviewed with the patient including bleeding, infection and unlikely damage to the uterus and the benefits of the procedure were explained to the patient and Written informed consent was obtained    Procedure Details  IUD strings visible:  yes  Local anesthesia:  None  Tenaculum used:  None  Removal:  IUD strings grasped and IUD removed intact with gentle traction.  The patient tolerated the procedure well.    All appropriate instructions regarding removal were reviewed.    Patient tolerated the procedure well without complications.    Plans for contraception:  no method    Other follow-up needed:  Patient to track symptoms and return if no improvement.  Will send prenatal vitamin to pharmacy.     The patient was advised to call for any fever or for prolonged or severe pain or bleeding. She was advised to use OTC analgesics as needed for mild to moderate pain.     Odell Can, APRN  11/28/2022  13:41 EST

## 2023-03-09 ENCOUNTER — TELEPHONE (OUTPATIENT)
Dept: OBSTETRICS AND GYNECOLOGY | Facility: CLINIC | Age: 28
End: 2023-03-09
Payer: COMMERCIAL

## 2023-03-09 ENCOUNTER — LAB (OUTPATIENT)
Dept: LAB | Facility: HOSPITAL | Age: 28
End: 2023-03-09
Payer: COMMERCIAL

## 2023-03-09 DIAGNOSIS — O36.80X0 PREGNANCY WITH INCONCLUSIVE FETAL VIABILITY, SINGLE OR UNSPECIFIED FETUS: Primary | ICD-10-CM

## 2023-03-09 DIAGNOSIS — O36.80X0 PREGNANCY WITH INCONCLUSIVE FETAL VIABILITY, SINGLE OR UNSPECIFIED FETUS: ICD-10-CM

## 2023-03-09 LAB — HCG INTACT+B SERPL-ACNC: 5.97 MIU/ML

## 2023-03-09 PROCEDURE — 84702 CHORIONIC GONADOTROPIN TEST: CPT

## 2023-03-09 PROCEDURE — 36415 COLL VENOUS BLD VENIPUNCTURE: CPT

## 2023-03-09 NOTE — TELEPHONE ENCOUNTER
Please let patient know spotting in early pregnancy can be normal.   Any bleeding heavy as a period needs to be evaluated in the emergency room.   Constipation is also a normal pregnancy symptom which can be caused by vitamins and the natural slowing of the intestinal track due to pregnancy.  Would recommend stool softener such as colace and adequate water intake.     I will place orders for beta HCG and early ultrasound for patient.  She can have beta drawn at her convenience, will plan ultrasound in 2-3 weeks as her LMP of 1/28/23 makes her 5w5d today.

## 2023-03-09 NOTE — TELEPHONE ENCOUNTER
----- Message from Joy Carlos sent at 3/9/2023  8:06 AM EST -----  Regarding: Pregnancy   Contact: [phone]  Please review      ----- Message -----  From: Sherri SEBASTIAN  Sent: 3/9/2023   7:50 AM EST  To: [facility] [address] Clinical Pool  Subject: Pregnancy                                        I will say that I have been constipated from the prenatals I'm assuming and the bleeding occurs/gets more intense when I'm pushing too hard. Early morning. Before bed. But now I've noticed little clots here and there.

## 2023-03-10 ENCOUNTER — TELEPHONE (OUTPATIENT)
Dept: OBSTETRICS AND GYNECOLOGY | Facility: CLINIC | Age: 28
End: 2023-03-10
Payer: COMMERCIAL

## 2023-03-10 DIAGNOSIS — O36.80X0 PREGNANCY WITH INCONCLUSIVE FETAL VIABILITY, SINGLE OR UNSPECIFIED FETUS: Primary | ICD-10-CM

## 2023-03-10 NOTE — TELEPHONE ENCOUNTER
Please let patient know her beta HCG level is 5.97, this is consistent with approximately 3 weeks gestation.  Recommend repeat level tomorrow or Monday to ensure her level is rising appropriately.  I will place order.

## 2023-03-10 NOTE — TELEPHONE ENCOUNTER
Spoke with patient, she is only bleeding with using the restroom.  Does not have any blood on her pad.  Discussed concern with low beta HCG level she may be having an early miscarriage.  Reviewed bleeding precautions and when to seek evaluation in the ED.   Encouraged patient to have beta HCG drawn again tomorrow or Monday. Verbalizes understanding.

## 2023-03-11 ENCOUNTER — LAB (OUTPATIENT)
Dept: LAB | Facility: HOSPITAL | Age: 28
End: 2023-03-11
Payer: COMMERCIAL

## 2023-03-11 DIAGNOSIS — O36.80X0 PREGNANCY WITH INCONCLUSIVE FETAL VIABILITY, SINGLE OR UNSPECIFIED FETUS: ICD-10-CM

## 2023-03-11 LAB
ABO GROUP BLD: NORMAL
BLD GP AB SCN SERPL QL: NEGATIVE
HCG INTACT+B SERPL-ACNC: 1.6 MIU/ML
RH BLD: POSITIVE
T&S EXPIRATION DATE: NORMAL

## 2023-03-11 PROCEDURE — 86850 RBC ANTIBODY SCREEN: CPT

## 2023-03-11 PROCEDURE — 86900 BLOOD TYPING SEROLOGIC ABO: CPT

## 2023-03-11 PROCEDURE — 84702 CHORIONIC GONADOTROPIN TEST: CPT

## 2023-03-11 PROCEDURE — 86901 BLOOD TYPING SEROLOGIC RH(D): CPT

## 2023-03-11 PROCEDURE — 36415 COLL VENOUS BLD VENIPUNCTURE: CPT

## 2023-03-13 ENCOUNTER — TELEPHONE (OUTPATIENT)
Dept: OBSTETRICS AND GYNECOLOGY | Facility: CLINIC | Age: 28
End: 2023-03-13
Payer: COMMERCIAL

## 2023-03-13 ENCOUNTER — LAB (OUTPATIENT)
Dept: LAB | Facility: HOSPITAL | Age: 28
End: 2023-03-13
Payer: COMMERCIAL

## 2023-03-13 DIAGNOSIS — O36.80X0 PREGNANCY WITH INCONCLUSIVE FETAL VIABILITY, SINGLE OR UNSPECIFIED FETUS: ICD-10-CM

## 2023-03-13 DIAGNOSIS — O36.80X0 PREGNANCY WITH INCONCLUSIVE FETAL VIABILITY, SINGLE OR UNSPECIFIED FETUS: Primary | ICD-10-CM

## 2023-03-13 PROCEDURE — 84702 CHORIONIC GONADOTROPIN TEST: CPT

## 2023-03-13 NOTE — TELEPHONE ENCOUNTER
Called patient to discuss declining HCG levels, states spotting is light pink now.  Recommend one more level today or tomorrow.  Recommend she wait until after next regular cycle to try again for pregnancy.  When to seek treatment in ED reviewed.  Verbalizes understanding.

## 2023-03-14 LAB — HCG INTACT+B SERPL-ACNC: <1 MIU/ML

## 2023-04-10 ENCOUNTER — TELEPHONE (OUTPATIENT)
Dept: OBSTETRICS AND GYNECOLOGY | Facility: CLINIC | Age: 28
End: 2023-04-10
Payer: COMMERCIAL

## 2023-04-10 DIAGNOSIS — N92.6 LATE PERIOD: Primary | ICD-10-CM

## 2023-04-10 NOTE — TELEPHONE ENCOUNTER
processCaller: Sherri SEBASTIAN    Relationship to patient: Self    Best call back number: [phone]    Patient is needing: PATIENT CALLED TO LET JAKI ARMSTRONG KNOW THAT SHE HASN'T HAD A PERIOD IN 5 WEEKS SINCE MISCARRIAGE.

## 2023-04-11 ENCOUNTER — LAB (OUTPATIENT)
Dept: LAB | Facility: HOSPITAL | Age: 28
End: 2023-04-11
Payer: COMMERCIAL

## 2023-04-11 DIAGNOSIS — I10 PRIMARY HYPERTENSION: ICD-10-CM

## 2023-04-11 DIAGNOSIS — N13.9 UROPATHY, OBSTRUCTIVE: ICD-10-CM

## 2023-04-11 DIAGNOSIS — N92.6 LATE PERIOD: ICD-10-CM

## 2023-04-11 LAB — HCG INTACT+B SERPL-ACNC: <1 MIU/ML

## 2023-04-11 PROCEDURE — 84702 CHORIONIC GONADOTROPIN TEST: CPT

## 2023-04-11 PROCEDURE — 36415 COLL VENOUS BLD VENIPUNCTURE: CPT

## 2023-05-10 ENCOUNTER — OFFICE VISIT (OUTPATIENT)
Dept: OBSTETRICS AND GYNECOLOGY | Facility: CLINIC | Age: 28
End: 2023-05-10
Payer: COMMERCIAL

## 2023-05-10 VITALS
SYSTOLIC BLOOD PRESSURE: 125 MMHG | HEART RATE: 90 BPM | WEIGHT: 223 LBS | DIASTOLIC BLOOD PRESSURE: 80 MMHG | BODY MASS INDEX: 38.26 KG/M2

## 2023-05-10 DIAGNOSIS — N92.6 LATE PERIOD: Primary | ICD-10-CM

## 2023-05-10 DIAGNOSIS — Z32.02 PREGNANCY TEST NEGATIVE: ICD-10-CM

## 2023-05-10 LAB
B-HCG UR QL: NEGATIVE
EXPIRATION DATE: NORMAL
INTERNAL NEGATIVE CONTROL: NEGATIVE
INTERNAL POSITIVE CONTROL: NORMAL
Lab: NORMAL

## 2023-05-10 NOTE — PROGRESS NOTES
GYN Visit    CC:   Chief Complaint   Patient presents with   • Possible Pregnancy     Had positive home test in march        HPI:   28 y.o. Contraception or HRT: Contraception:  None    Here for follow up.  Had a normal cycle on 23.  Is trying for pregnancy again.  No complaints.      History: PMHx, Meds, Allergies, PSHx, Social Hx, and POBHx all reviewed and updated.    Review of Systems   Constitutional: Negative.    Genitourinary: Negative.        PHYSICAL EXAM:  /80   Pulse 90   Wt 101 kg (223 lb)   LMP 2023 (Exact Date) Comment: pt estimated that last period ended approx. 2.5 weeks ago  BMI 38.26 kg/m²      Physical Exam  Vitals and nursing note reviewed.   Constitutional:       Appearance: Normal appearance. She is well-developed and well-groomed.   Neurological:      Mental Status: She is alert.   Psychiatric:         Attention and Perception: Attention and perception normal.         Mood and Affect: Affect normal.         Speech: Speech normal.         Behavior: Behavior is cooperative.         Cognition and Memory: Cognition normal.         ASSESSMENT AND PLAN:  Diagnoses and all orders for this visit:    1. Late period (Primary)    2. Pregnancy test negative  -     POC Pregnancy, Urine        Counseling:  • Call with positive home test, will order serial beta HCG levels    Follow Up:  Return as needed.      Odell Can, APRN  05/10/2023    [facility] [address]  [facility]  [address] [place] [address]  Dept: [phone]  Loc: [phone]

## 2023-05-11 ENCOUNTER — TELEPHONE (OUTPATIENT)
Dept: OBSTETRICS AND GYNECOLOGY | Facility: CLINIC | Age: 28
End: 2023-05-11
Payer: COMMERCIAL

## 2023-05-11 DIAGNOSIS — Z32.00 ENCOUNTER FOR PREGNANCY TEST, RESULT UNKNOWN: Primary | ICD-10-CM

## 2023-05-12 ENCOUNTER — LAB (OUTPATIENT)
Dept: LAB | Facility: HOSPITAL | Age: 28
End: 2023-05-12
Payer: COMMERCIAL

## 2023-05-12 DIAGNOSIS — Z32.00 ENCOUNTER FOR PREGNANCY TEST, RESULT UNKNOWN: ICD-10-CM

## 2023-05-12 LAB — HCG INTACT+B SERPL-ACNC: <1 MIU/ML

## 2023-05-12 PROCEDURE — 84702 CHORIONIC GONADOTROPIN TEST: CPT

## 2023-05-12 PROCEDURE — 36415 COLL VENOUS BLD VENIPUNCTURE: CPT

## 2023-05-15 ENCOUNTER — TELEPHONE (OUTPATIENT)
Dept: OBSTETRICS AND GYNECOLOGY | Facility: CLINIC | Age: 28
End: 2023-05-15
Payer: COMMERCIAL

## 2023-05-15 NOTE — TELEPHONE ENCOUNTER
Informed patient that results was negative and no repeat is needed at this time. Patient voices understanding.

## 2023-05-15 NOTE — TELEPHONE ENCOUNTER
----- Message from Laxmi Newby sent at 5/15/2023  6:57 AM EDT -----  Please contact patient with negative results.  No repeat needed at this time.

## 2023-07-31 ENCOUNTER — TELEPHONE (OUTPATIENT)
Dept: OBSTETRICS AND GYNECOLOGY | Facility: CLINIC | Age: 28
End: 2023-07-31
Payer: COMMERCIAL

## 2023-07-31 DIAGNOSIS — O36.80X0 PREGNANCY WITH INCONCLUSIVE FETAL VIABILITY, SINGLE OR UNSPECIFIED FETUS: Primary | ICD-10-CM

## 2023-07-31 DIAGNOSIS — Z32.00 PREGNANCY EXAMINATION OR TEST, PREGNANCY UNCONFIRMED: ICD-10-CM

## 2023-08-01 ENCOUNTER — LAB (OUTPATIENT)
Dept: LAB | Facility: HOSPITAL | Age: 28
End: 2023-08-01
Payer: COMMERCIAL

## 2023-08-01 DIAGNOSIS — Z32.00 PREGNANCY EXAMINATION OR TEST, PREGNANCY UNCONFIRMED: ICD-10-CM

## 2023-08-01 LAB — HCG INTACT+B SERPL-ACNC: 81.81 MIU/ML

## 2023-08-01 PROCEDURE — 84702 CHORIONIC GONADOTROPIN TEST: CPT

## 2023-08-01 PROCEDURE — 36415 COLL VENOUS BLD VENIPUNCTURE: CPT

## 2023-08-02 DIAGNOSIS — O20.0 THREATENED ABORTION: Primary | ICD-10-CM

## 2023-08-03 ENCOUNTER — LAB (OUTPATIENT)
Dept: LAB | Facility: HOSPITAL | Age: 28
End: 2023-08-03
Payer: COMMERCIAL

## 2023-08-03 DIAGNOSIS — O20.0 THREATENED ABORTION: ICD-10-CM

## 2023-08-03 LAB — HCG INTACT+B SERPL-ACNC: 198 MIU/ML

## 2023-08-03 PROCEDURE — 84702 CHORIONIC GONADOTROPIN TEST: CPT

## 2023-08-07 ENCOUNTER — LAB (OUTPATIENT)
Dept: LAB | Facility: HOSPITAL | Age: 28
End: 2023-08-07
Payer: COMMERCIAL

## 2023-08-07 DIAGNOSIS — O20.0 THREATENED ABORTION: ICD-10-CM

## 2023-08-07 LAB — HCG INTACT+B SERPL-ACNC: 1233 MIU/ML

## 2023-08-07 PROCEDURE — 84702 CHORIONIC GONADOTROPIN TEST: CPT

## 2023-08-08 ENCOUNTER — TELEPHONE (OUTPATIENT)
Dept: OBSTETRICS AND GYNECOLOGY | Facility: CLINIC | Age: 28
End: 2023-08-08
Payer: COMMERCIAL

## 2023-08-08 DIAGNOSIS — Z34.91 PREGNANCY WITH UNCERTAIN DATES IN FIRST TRIMESTER: Primary | ICD-10-CM

## 2023-08-08 NOTE — TELEPHONE ENCOUNTER
PATIENT SAID SHE WAS TOLD LAST WEEK THAT SHE COULD HAVE AN US AND SHE WAS CALLING IN TODAY TO SCHEDULE AN US. PLEASE CALL PATENT BACK [phone]        THANK YOU

## 2023-08-09 ENCOUNTER — HOSPITAL ENCOUNTER (OUTPATIENT)
Dept: ULTRASOUND IMAGING | Facility: HOSPITAL | Age: 28
Discharge: HOME | End: 2023-08-09
Admitting: OBSTETRICS & GYNECOLOGY
Payer: COMMERCIAL

## 2023-08-09 DIAGNOSIS — Z34.91 PREGNANCY WITH UNCERTAIN DATES IN FIRST TRIMESTER: ICD-10-CM

## 2023-08-09 PROCEDURE — 76801 OB US < 14 WKS SINGLE FETUS: CPT

## 2023-08-14 ENCOUNTER — OFFICE VISIT (OUTPATIENT)
Dept: OBSTETRICS AND GYNECOLOGY | Facility: CLINIC | Age: 28
End: 2023-08-14
Payer: COMMERCIAL

## 2023-08-14 VITALS
DIASTOLIC BLOOD PRESSURE: 86 MMHG | HEIGHT: 64 IN | WEIGHT: 230 LBS | SYSTOLIC BLOOD PRESSURE: 130 MMHG | BODY MASS INDEX: 39.27 KG/M2 | HEART RATE: 97 BPM

## 2023-08-14 DIAGNOSIS — O20.0 THREATENED ABORTION: Primary | ICD-10-CM

## 2023-08-14 PROBLEM — D72.829 LEUKOCYTOSIS: Status: RESOLVED | Noted: 2022-08-24 | Resolved: 2023-08-14

## 2023-08-14 PROBLEM — N83.209 OVARIAN CYST: Status: RESOLVED | Noted: 2022-06-03 | Resolved: 2023-08-14

## 2023-08-14 PROBLEM — N20.1 URETERAL STONE: Status: RESOLVED | Noted: 2022-08-17 | Resolved: 2023-08-14

## 2023-08-14 PROBLEM — N12 PYELONEPHRITIS: Status: RESOLVED | Noted: 2022-08-21 | Resolved: 2023-08-14

## 2023-08-14 PROBLEM — N13.9 UROPATHY, OBSTRUCTIVE: Status: RESOLVED | Noted: 2022-08-24 | Resolved: 2023-08-14

## 2023-08-14 PROBLEM — K59.03 DRUG-INDUCED CONSTIPATION: Status: RESOLVED | Noted: 2022-08-24 | Resolved: 2023-08-14

## 2023-08-14 PROBLEM — N20.0 KIDNEY STONE: Status: RESOLVED | Noted: 2022-06-21 | Resolved: 2023-08-14

## 2023-08-14 PROBLEM — Z96.0 RETAINED URETERAL STENT: Status: RESOLVED | Noted: 2022-08-24 | Resolved: 2023-08-14

## 2023-08-14 RX ORDER — PRENATAL VIT/IRON FUM/FOLIC AC 27MG-0.8MG
1 TABLET ORAL DAILY
COMMUNITY

## 2023-08-14 NOTE — PROGRESS NOTES
"GYN Visit    CC: Follow-up ultrasound    HPI:   28 y.o. who presents in follow-up of a pelvic ultrasound for a threatened miscarriage.  Patient denies any active bleeding.  Reports occasional mild lower pelvic cramping.  Reports mild nausea and vomiting.    History: PMHx, Meds, Allergies, PSHx, Social Hx, and POBHx all reviewed and updated.    /86   Pulse 97   Ht 162.6 cm (64.02\")   Wt 104 kg (230 lb)   LMP 2023 Comment: Started bleeding with miscarriage this day  Breastfeeding No   BMI 39.45 kg/mý     Physical Exam  Vitals and nursing note reviewed. Exam conducted with a chaperone present.   Constitutional:       General: She is not in acute distress.     Appearance: Normal appearance. She is not ill-appearing.   Abdominal:      General: Abdomen is flat. There is no distension.      Palpations: Abdomen is soft. There is no mass.      Tenderness: There is no abdominal tenderness. There is no guarding or rebound.      Hernia: No hernia is present. There is no hernia in the left inguinal area or right inguinal area.   Genitourinary:     General: Normal vulva.      Exam position: Lithotomy position.      Pubic Area: No rash.       Labia:         Right: No rash, tenderness, lesion or injury.         Left: No rash, tenderness, lesion or injury.       Urethra: No prolapse, urethral pain or urethral lesion.      Vagina: Normal.      Cervix: Normal. No friability, lesion, erythema or cervical bleeding.      Uterus: Normal. Not deviated, not enlarged, not fixed and not tender.       Adnexa:         Right: No mass or tenderness.          Left: No tenderness or fullness.     Musculoskeletal:         General: No swelling.      Right lower leg: No edema.      Left lower leg: No edema.   Skin:     General: Skin is warm and dry.   Neurological:      Mental Status: She is alert and oriented to person, place, and time.   Psychiatric:         Mood and Affect: Mood normal.         Behavior: Behavior normal.    "      Thought Content: Thought content normal.         Judgment: Judgment normal.     Bedside endovaginal ultrasound: There is a sommer intrauterine gestation noted.  Within the uterine cavity there is a gestational sac.  Within the gestational sac is a yolk sac and what appears to be a fetal pole.  No obvious cardiac activity is noted.  No gross abnormalities are noted.    Ultrasound from 2023 reviewed  hCG from 2023, 2023, 8/3/2023, 2023 all reviewed    ASSESSMENT AND PLAN:  Diagnoses and all orders for this visit:    1. Threatened  (Primary)  Assessment & Plan:  hCG have been rising appropriately.  The patient is not having any active symptoms of miscarriage.  Ultrasound currently is reassuring.  Recommend repeat ultrasound in about 2 weeks to evaluate for cardiac activity.  Would expect definitive cardiac tibia at that time.  Continue daily prenatal vitamin.  SAB warnings reviewed.    Orders:  -     US Ob < 14 Weeks Single or First Gestation; Future      Follow Up:  Return in about 2 weeks (around 2023) for Recheck.      Andres Pruitt MD  2023

## 2023-08-14 NOTE — ASSESSMENT & PLAN NOTE
hCG have been rising appropriately.  The patient is not having any active symptoms of miscarriage.  Ultrasound currently is reassuring.  Recommend repeat ultrasound in about 2 weeks to evaluate for cardiac activity.  Would expect definitive cardiac tibia at that time.  Continue daily prenatal vitamin.  SAB warnings reviewed.

## 2023-08-30 ENCOUNTER — OFFICE VISIT (OUTPATIENT)
Dept: OBSTETRICS AND GYNECOLOGY | Facility: CLINIC | Age: 28
End: 2023-08-30
Payer: COMMERCIAL

## 2023-08-30 VITALS
BODY MASS INDEX: 38.41 KG/M2 | WEIGHT: 225 LBS | HEART RATE: 68 BPM | HEIGHT: 64 IN | SYSTOLIC BLOOD PRESSURE: 117 MMHG | DIASTOLIC BLOOD PRESSURE: 79 MMHG

## 2023-08-30 DIAGNOSIS — O20.0 THREATENED ABORTION: Primary | ICD-10-CM

## 2023-08-30 RX ORDER — PRENATAL VIT/IRON FUM/FOLIC AC 27MG-0.8MG
1 TABLET ORAL DAILY
Qty: 90 TABLET | Refills: 4 | Status: SHIPPED | OUTPATIENT
Start: 2023-08-30

## 2023-08-30 NOTE — ASSESSMENT & PLAN NOTE
Today's ultrasound was reviewed.  The findings are reassuring.  EDC would be 1424 by crown-rump length.  Fetal heart rate was normal.  No abnormal findings.  Recommend continue prenatal vitamin.  SAB warnings reviewed.  Plan repeat ultrasound in a few weeks and then initial OB intake.

## 2023-08-30 NOTE — PROGRESS NOTES
"GYN Visit    CC: Follow-up ultrasound    HPI:   28 y.o. who presents for follow-up of pelvic ultrasound for threatened miscarriage.  Her bleeding stopped about a day after her last office visit.  She had no further vaginal bleeding.  She does have some mild pelvic cramping.  She is still having some nausea and vomiting.  No other problems today.      History: PMHx, Meds, Allergies, PSHx, Social Hx, and POBHx all reviewed and updated.    /79   Pulse 68   Ht 162.6 cm (64.02\")   Wt 102 kg (225 lb)   LMP 2023 Comment: Started bleeding with miscarriage this day  Breastfeeding No   BMI 38.60 kg/mý     Physical Exam  Vitals and nursing note reviewed.   Constitutional:       General: She is not in acute distress.     Appearance: Normal appearance. She is not ill-appearing.   Musculoskeletal:         General: No swelling.      Right lower leg: No edema.      Left lower leg: No edema.   Skin:     General: Skin is warm and dry.      Findings: No rash.   Neurological:      Mental Status: She is alert and oriented to person, place, and time.   Psychiatric:         Mood and Affect: Mood normal.         Behavior: Behavior normal.         Thought Content: Thought content normal.         Judgment: Judgment normal.         ASSESSMENT AND PLAN:  Diagnoses and all orders for this visit:    1. Threatened  (Primary)  Assessment & Plan:  Today's ultrasound was reviewed.  The findings are reassuring.  EDC would be 1424 by crown-rump length.  Fetal heart rate was normal.  No abnormal findings.  Recommend continue prenatal vitamin.  SAB warnings reviewed.  Plan repeat ultrasound in a few weeks and then initial OB intake.    Orders:  -     Prenatal Vit-Fe Fumarate-FA (prenatal vitamin 27-0.8) 27-0.8 MG tablet tablet; Take 1 tablet by mouth Daily.  Dispense: 90 tablet; Refill: 4  -     US Ob < 14 Weeks Single or First Gestation; Future        Follow Up:  Return in about 3 weeks (around 2023) for as a new " ob with me.      Andres Pruitt MD  08/30/2023

## 2023-09-18 PROBLEM — O10.919 CHRONIC HYPERTENSION AFFECTING PREGNANCY: Status: ACTIVE | Noted: 2023-09-18

## 2023-09-18 PROBLEM — Z34.80 SUPERVISION OF OTHER NORMAL PREGNANCY, ANTEPARTUM: Status: ACTIVE | Noted: 2023-09-18

## 2023-09-18 NOTE — PROGRESS NOTES
OB Initial Visit    CC- Here for care of current pregnancy     Subjective:  28 y.o.  presenting for her first obstetrical visit.    LMP: Patient's last menstrual period was 2023 (approximate).       Reviewed and updated:  OBHx, GYNHx (STDs), PMHx, Medications, Allergies, PSHx, Social Hx, Preventative Hx (PAP), Hx of abuse/safe environment, Vaccine Hx including hx of chickenpox or vaccine, Genetic Hx (pt, FOB, both families).        Objective:  /75   Wt 101 kg (222 lb)   LMP 2023 (Approximate) Comment: Started bleeding with miscarriage this day  BMI 38.08 kg/m²   General- NAD, alert and oriented, appropriate  Psych- Normal mood, good memory  Neck- No masses, no thyroid enlargement  CV- Regular rhythm, no murnurs  Resp- CTA to bases, no wheezes  Abdomen- Soft, non distended, non tender, no masses   External genitalia- Normal, no lesions  Urethra- Normal, no masses, non tender  Vagina- Normal, no discharge  Bladder- Normal, no masses, non tender  Cvx- Normal, no lesions, no discharge, no CMT  Uterus- Normal shape and consistency, non tender, Consistent with dates  Adnexa- Normal, no mass, non tender  Lymphatic- No palpable neck, axillary, or groin nodes  Ext- No edema, no cyanosis    Skin- No lesions, no rashes, no acanthosis nigricans      Assessment and Plan:  Diagnoses and all orders for this visit:    1. Obesity affecting pregnancy, antepartum (Primary)  Assessment & Plan:  Discussed exercise, nutrition and appropriate weight gain in pregnancy      2. Supervision of other normal pregnancy, antepartum  Overview:  EDC: 2024    Prenatal genetic screening: Nips    History of chronic hypertension no meds  Obesity    COVID-19 vaccine: Complete, booster recommended  Flu vaccine: Recommended  Tdap vaccine:    Assessment & Plan:  Continue prenatal vitamins  Check prenatal labs  Dating ultrasound reviewed  Discussed office visit schedule and call rotation  Reviewed medication safe in  pregnancy  Declines prenatal genetic screening  Reviewed nutrition, exercise, and appropriate weight gain in pregnancy    Orders:  -     Cancel: CBC & Differential  -     Urine Culture - Urine, Urine, Clean Catch  -     Comprehensive Metabolic Panel; Future  -     Hemoglobinopathy Fractionation [place]  -     OB Panel With HIV; Future  -     IGP,CtNgTv,rfx Aptima HPV ASCU  -     POC Urinalysis Dipstick  -     [id number] PLUS Core+SCA+ESS - Blood,; Future  -     Comprehensive Metabolic Panel  -     OB Panel With HIV  -     [id number] PLUS Core+SCA+ESS - Blood, Arm, Left    3. Chronic hypertension affecting pregnancy  Overview:  9/19/2023: No meds currently  ASA 81 mg    Assessment & Plan:  Patient is not currently on any medications and has not been for some time.  Her blood pressure is normal.  Recommend starting a baby aspirin around 12 weeks of gestation.  Check baseline labs.  Will likely need serial growth ultrasounds.              11w3d    Genetic Screening: NIPS    Vaccines: Recommend FLU vaccine this season, R/B discussed  s/p COVID vaccine  COVID booster recommended    Counseling: Nutrition discussed, calories, activity/exercise in pregnancy  Discussed dietary restrictions/safety food preparation in pregnancy  Reviewed what to expect prenatal visits, office providers and [facility] hospitalists  Appropriate trimester precautions provided, N/V, vag bleeding, cramping  Questions answered    Return in about 3 weeks (around 10/10/2023) for Recheck.      Andres Pruitt MD  09/19/2023

## 2023-09-19 ENCOUNTER — INITIAL PRENATAL (OUTPATIENT)
Dept: OBSTETRICS AND GYNECOLOGY | Facility: CLINIC | Age: 28
End: 2023-09-19
Payer: COMMERCIAL

## 2023-09-19 VITALS — DIASTOLIC BLOOD PRESSURE: 75 MMHG | WEIGHT: 222 LBS | BODY MASS INDEX: 38.08 KG/M2 | SYSTOLIC BLOOD PRESSURE: 107 MMHG

## 2023-09-19 DIAGNOSIS — O99.210 OBESITY AFFECTING PREGNANCY, ANTEPARTUM: Primary | ICD-10-CM

## 2023-09-19 DIAGNOSIS — O10.919 CHRONIC HYPERTENSION AFFECTING PREGNANCY: ICD-10-CM

## 2023-09-19 DIAGNOSIS — Z34.80 SUPERVISION OF OTHER NORMAL PREGNANCY, ANTEPARTUM: ICD-10-CM

## 2023-09-19 PROBLEM — O20.0 THREATENED ABORTION: Status: RESOLVED | Noted: 2023-08-14 | Resolved: 2023-09-19

## 2023-09-19 LAB
ABO GROUP BLD: NORMAL
ALBUMIN SERPL-MCNC: 4.3 G/DL (ref 3.5–5.2)
ALBUMIN/GLOB SERPL: 1.3 G/DL
ALP SERPL-CCNC: 78 U/L (ref 39–117)
ALT SERPL W P-5'-P-CCNC: 12 U/L (ref 1–33)
ANION GAP SERPL CALCULATED.3IONS-SCNC: 13 MMOL/L (ref 5–15)
AST SERPL-CCNC: 14 U/L (ref 1–32)
BASOPHILS # BLD AUTO: 0.01 10*3/MM3 (ref 0–0.2)
BASOPHILS NFR BLD AUTO: 0.1 % (ref 0–1.5)
BILIRUB SERPL-MCNC: 0.3 MG/DL (ref 0–1.2)
BLD GP AB SCN SERPL QL: NEGATIVE
BUN SERPL-MCNC: 8 MG/DL (ref 6–20)
BUN/CREAT SERPL: 10.5 (ref 7–25)
CALCIUM SPEC-SCNC: 9.9 MG/DL (ref 8.6–10.5)
CHLORIDE SERPL-SCNC: 102 MMOL/L (ref 98–107)
CO2 SERPL-SCNC: 21 MMOL/L (ref 22–29)
CREAT SERPL-MCNC: 0.76 MG/DL (ref 0.57–1)
DEPRECATED RDW RBC AUTO: 40.6 FL (ref 37–54)
EGFRCR SERPLBLD CKD-EPI 2021: 108.9 ML/MIN/1.73
EOSINOPHIL # BLD AUTO: 0.03 10*3/MM3 (ref 0–0.4)
EOSINOPHIL NFR BLD AUTO: 0.4 % (ref 0.3–6.2)
ERYTHROCYTE [DISTWIDTH] IN BLOOD BY AUTOMATED COUNT: 12.5 % (ref 12.3–15.4)
GLOBULIN UR ELPH-MCNC: 3.2 GM/DL
GLUCOSE SERPL-MCNC: 81 MG/DL (ref 65–99)
GLUCOSE UR STRIP-MCNC: NEGATIVE MG/DL
HBV SURFACE AG SERPL QL IA: NORMAL
HCT VFR BLD AUTO: 37 % (ref 34–46.6)
HCV AB SER DONR QL: NORMAL
HGB BLD-MCNC: 12.6 G/DL (ref 12–15.9)
HIV1+2 AB SER QL: NORMAL
IMM GRANULOCYTES # BLD AUTO: 0.02 10*3/MM3 (ref 0–0.05)
IMM GRANULOCYTES NFR BLD AUTO: 0.3 % (ref 0–0.5)
LYMPHOCYTES # BLD AUTO: 1.34 10*3/MM3 (ref 0.7–3.1)
LYMPHOCYTES NFR BLD AUTO: 17.1 % (ref 19.6–45.3)
MCH RBC QN AUTO: 30.4 PG (ref 26.6–33)
MCHC RBC AUTO-ENTMCNC: 34.1 G/DL (ref 31.5–35.7)
MCV RBC AUTO: 89.2 FL (ref 79–97)
MONOCYTES # BLD AUTO: 0.27 10*3/MM3 (ref 0.1–0.9)
MONOCYTES NFR BLD AUTO: 3.4 % (ref 5–12)
NEUTROPHILS NFR BLD AUTO: 6.17 10*3/MM3 (ref 1.7–7)
NEUTROPHILS NFR BLD AUTO: 78.7 % (ref 42.7–76)
NRBC BLD AUTO-RTO: 0 /100 WBC (ref 0–0.2)
PLATELET # BLD AUTO: 270 10*3/MM3 (ref 140–450)
PMV BLD AUTO: 9.8 FL (ref 6–12)
POTASSIUM SERPL-SCNC: 3.8 MMOL/L (ref 3.5–5.2)
PROT SERPL-MCNC: 7.5 G/DL (ref 6–8.5)
PROT UR STRIP-MCNC: NEGATIVE MG/DL
RBC # BLD AUTO: 4.15 10*6/MM3 (ref 3.77–5.28)
RH BLD: POSITIVE
SODIUM SERPL-SCNC: 136 MMOL/L (ref 136–145)
T PALLIDUM IGG SER QL: NORMAL
WBC NRBC COR # BLD: 7.84 10*3/MM3 (ref 3.4–10.8)

## 2023-09-19 PROCEDURE — 86803 HEPATITIS C AB TEST: CPT | Performed by: OBSTETRICS & GYNECOLOGY

## 2023-09-19 PROCEDURE — 86900 BLOOD TYPING SEROLOGIC ABO: CPT | Performed by: OBSTETRICS & GYNECOLOGY

## 2023-09-19 PROCEDURE — 86850 RBC ANTIBODY SCREEN: CPT | Performed by: OBSTETRICS & GYNECOLOGY

## 2023-09-19 PROCEDURE — 85025 COMPLETE CBC W/AUTO DIFF WBC: CPT | Performed by: OBSTETRICS & GYNECOLOGY

## 2023-09-19 PROCEDURE — 87661 TRICHOMONAS VAGINALIS AMPLIF: CPT

## 2023-09-19 PROCEDURE — 80053 COMPREHEN METABOLIC PANEL: CPT | Performed by: OBSTETRICS & GYNECOLOGY

## 2023-09-19 PROCEDURE — 87086 URINE CULTURE/COLONY COUNT: CPT | Performed by: OBSTETRICS & GYNECOLOGY

## 2023-09-19 PROCEDURE — 87491 CHLMYD TRACH DNA AMP PROBE: CPT

## 2023-09-19 PROCEDURE — G0432 EIA HIV-1/HIV-2 SCREEN: HCPCS | Performed by: OBSTETRICS & GYNECOLOGY

## 2023-09-19 PROCEDURE — 87340 HEPATITIS B SURFACE AG IA: CPT | Performed by: OBSTETRICS & GYNECOLOGY

## 2023-09-19 PROCEDURE — 86780 TREPONEMA PALLIDUM: CPT | Performed by: OBSTETRICS & GYNECOLOGY

## 2023-09-19 PROCEDURE — G0123 SCREEN CERV/VAG THIN LAYER: HCPCS

## 2023-09-19 PROCEDURE — 87591 N.GONORRHOEAE DNA AMP PROB: CPT

## 2023-09-19 PROCEDURE — 86901 BLOOD TYPING SEROLOGIC RH(D): CPT | Performed by: OBSTETRICS & GYNECOLOGY

## 2023-09-19 NOTE — ASSESSMENT & PLAN NOTE
Patient is not currently on any medications and has not been for some time.  Her blood pressure is normal.  Recommend starting a baby aspirin around 12 weeks of gestation.  Check baseline labs.  Will likely need serial growth ultrasounds.

## 2023-09-19 NOTE — PATIENT INSTRUCTIONS
Venipuncture Blood Specimen Collection  Venipuncture performed in left arm by Nan Kim with good hemostasis. Patient tolerated the procedure well without complications.   09/19/23   Nan Kim

## 2023-09-19 NOTE — ASSESSMENT & PLAN NOTE
Continue prenatal vitamins  Check prenatal labs  Dating ultrasound reviewed  Discussed office visit schedule and call rotation  Reviewed medication safe in pregnancy  Declines prenatal genetic screening  Reviewed nutrition, exercise, and appropriate weight gain in pregnancy

## 2023-09-20 LAB — BACTERIA SPEC AEROBE CULT: ABNORMAL

## 2023-09-21 LAB
HGB A MFR BLD ELPH: 96.9 % (ref 96.4–98.8)
HGB A2 MFR BLD ELPH: 2.8 % (ref 1.8–3.2)
HGB F MFR BLD ELPH: 0.3 % (ref 0–2)
HGB FRACT BLD-IMP: NORMAL
HGB S MFR BLD ELPH: 0 %
RUBV IGG SERPL IA-ACNC: <0.9 INDEX

## 2023-09-22 LAB
C TRACH RRNA CVX QL NAA+PROBE: NEGATIVE
CONV .: NORMAL
CYTOLOGIST CVX/VAG CYTO: NORMAL
CYTOLOGY CVX/VAG DOC CYTO: NORMAL
CYTOLOGY CVX/VAG DOC THIN PREP: NORMAL
DX ICD CODE: NORMAL
HIV 1 & 2 AB SER-IMP: NORMAL
N GONORRHOEA RRNA CVX QL NAA+PROBE: NEGATIVE
OTHER STN SPEC: NORMAL
STAT OF ADQ CVX/VAG CYTO-IMP: NORMAL
T VAGINALIS RRNA SPEC QL NAA+PROBE: NEGATIVE

## 2023-09-26 ENCOUNTER — TELEPHONE (OUTPATIENT)
Dept: OBSTETRICS AND GYNECOLOGY | Facility: CLINIC | Age: 28
End: 2023-09-26
Payer: COMMERCIAL

## 2023-09-26 NOTE — TELEPHONE ENCOUNTER
----- Message from Andres Pruitt MD sent at 9/26/2023 10:13 AM EDT -----  Please notify the patient that her nips testing was negative.  If she wants to know the gender is male.

## 2023-10-10 ENCOUNTER — ROUTINE PRENATAL (OUTPATIENT)
Dept: OBSTETRICS AND GYNECOLOGY | Facility: CLINIC | Age: 28
End: 2023-10-10
Payer: COMMERCIAL

## 2023-10-10 VITALS — DIASTOLIC BLOOD PRESSURE: 79 MMHG | BODY MASS INDEX: 38.08 KG/M2 | WEIGHT: 222 LBS | SYSTOLIC BLOOD PRESSURE: 114 MMHG

## 2023-10-10 DIAGNOSIS — O10.919 CHRONIC HYPERTENSION AFFECTING PREGNANCY: ICD-10-CM

## 2023-10-10 DIAGNOSIS — Z28.39 RUBELLA NON-IMMUNE STATUS, ANTEPARTUM: ICD-10-CM

## 2023-10-10 DIAGNOSIS — Z34.80 SUPERVISION OF OTHER NORMAL PREGNANCY, ANTEPARTUM: Primary | ICD-10-CM

## 2023-10-10 DIAGNOSIS — O09.899 RUBELLA NON-IMMUNE STATUS, ANTEPARTUM: ICD-10-CM

## 2023-10-10 DIAGNOSIS — O99.210 OBESITY AFFECTING PREGNANCY, ANTEPARTUM, UNSPECIFIED OBESITY TYPE: ICD-10-CM

## 2023-10-10 LAB
GLUCOSE UR STRIP-MCNC: NEGATIVE MG/DL
PROT UR STRIP-MCNC: NEGATIVE MG/DL

## 2023-10-10 NOTE — ASSESSMENT & PLAN NOTE
BP remains normal.  The patient has not started her baby aspirin yet.  Recommend she go ahead and start that ASAP.  Brook that this is for preeclampsia mitigation

## 2023-10-10 NOTE — ASSESSMENT & PLAN NOTE
Reviewed prenatal labs.  Reviewed final EDC.  Reviewed nips testing.  Continue prenatal vitamins.  Anatomy ultrasound next office visit.

## 2023-10-10 NOTE — PROGRESS NOTES
OB FOLLOW UP    CC: Scheduled OB routine FU     Prenatal care complicated by:   Patient Active Problem List   Diagnosis    Anxiety    Primary hypertension    Vitamin D deficiency    Supervision of other normal pregnancy, antepartum    Chronic hypertension affecting pregnancy    Obesity affecting pregnancy, antepartum    Rubella non-immune status, antepartum       Subjective:   Patient has: No complaints, No leaking fluid, No vaginal bleeding, No contractions  No fetal movement yet    Objective:  Urine glucose/protein- see flow sheet      /79   Wt 101 kg (222 lb)   LMP 06/18/2023 (Approximate) Comment: Started bleeding with miscarriage this day  BMI 38.08 kg/mý   See OB flow for LE edema, and cvx exam if applicable  FHT: 149 BPM   Uterine Size: size equals dates      Assessment and Plan:  Diagnoses and all orders for this visit:    1. Supervision of other normal pregnancy, antepartum (Primary)  Overview:  EDC: 4/6/2024    Prenatal genetic screening: Nips negative    History of chronic hypertension no meds  Obesity    COVID-19 vaccine: Complete, booster recommended  Flu vaccine: Recommended  Tdap vaccine:    Assessment & Plan:  Reviewed prenatal labs.  Reviewed final EDC.  Reviewed nips testing.  Continue prenatal vitamins.  Anatomy ultrasound next office visit.    Orders:  -     POC Urinalysis Dipstick  -     Urine Culture - Urine, Urine, Clean Catch  -     US Ob Detail Fetal Anatomy Single or First Gestation; Future    2. Chronic hypertension affecting pregnancy  Overview:  9/19/2023: No meds currently  ASA 81 mg    Assessment & Plan:  BP remains normal.  The patient has not started her baby aspirin yet.  Recommend she go ahead and start that ASAP.  Broko that this is for preeclampsia mitigation      3. Obesity affecting pregnancy, antepartum, unspecified obesity type    4. Rubella non-immune status, antepartum  Overview:  MMR postpartum              14w3d  Reassuring pregnancy progress    Counseling: Second  trimester precautions  OB precautions, leaking, VB, thalia richard vs PTL/Labor    Questions answered    Return in about 5 weeks (around 11/14/2023) for Recheck.      Andres rPuitt MD  10/10/2023

## 2023-11-20 ENCOUNTER — ROUTINE PRENATAL (OUTPATIENT)
Dept: OBSTETRICS AND GYNECOLOGY | Facility: CLINIC | Age: 28
End: 2023-11-20
Payer: COMMERCIAL

## 2023-11-20 VITALS — DIASTOLIC BLOOD PRESSURE: 74 MMHG | SYSTOLIC BLOOD PRESSURE: 121 MMHG | WEIGHT: 227 LBS | BODY MASS INDEX: 38.94 KG/M2

## 2023-11-20 DIAGNOSIS — O99.210 OBESITY AFFECTING PREGNANCY, ANTEPARTUM, UNSPECIFIED OBESITY TYPE: ICD-10-CM

## 2023-11-20 DIAGNOSIS — Z28.39 RUBELLA NON-IMMUNE STATUS, ANTEPARTUM: ICD-10-CM

## 2023-11-20 DIAGNOSIS — O10.919 CHRONIC HYPERTENSION AFFECTING PREGNANCY: ICD-10-CM

## 2023-11-20 DIAGNOSIS — Z34.80 SUPERVISION OF OTHER NORMAL PREGNANCY, ANTEPARTUM: Primary | ICD-10-CM

## 2023-11-20 DIAGNOSIS — O09.899 RUBELLA NON-IMMUNE STATUS, ANTEPARTUM: ICD-10-CM

## 2023-11-20 LAB
GLUCOSE UR STRIP-MCNC: NEGATIVE MG/DL
PROT UR STRIP-MCNC: NEGATIVE MG/DL

## 2023-11-20 NOTE — PROGRESS NOTES
OB FOLLOW UP    CC: Scheduled OB routine FU     Prenatal care complicated by:   Patient Active Problem List   Diagnosis    Anxiety    Primary hypertension    Vitamin D deficiency    Supervision of other normal pregnancy, antepartum    Chronic hypertension affecting pregnancy    Obesity affecting pregnancy, antepartum    Rubella non-immune status, antepartum       Subjective:   Patient has: No complaints, No leaking fluid, No vaginal bleeding, No contractions, Adequate FM    Objective:  Urine glucose/protein- see flow sheet      /74   Wt 103 kg (227 lb)   LMP 06/18/2023 (Approximate) Comment: Started bleeding with miscarriage this day  BMI 38.94 kg/m²   See OB flow for LE edema, and cvx exam if applicable  FHT: 130 BPM   Uterine Size:  20 cm      Assessment and Plan:  Diagnoses and all orders for this visit:    1. Supervision of other normal pregnancy, antepartum (Primary)  Overview:  EDC: 4/6/2024    Prenatal genetic screening: Nips negative    History of chronic hypertension no meds  Obesity    COVID-19 vaccine: Complete, booster recommended  Flu vaccine: Recommended  Tdap vaccine:    Assessment & Plan:  Reviewed today's anatomy ultrasound.  The anatomy appears normal and complete.  Continue prenatal vitamin  1 hour GTT next office visit    Orders:  -     POC Urinalysis Dipstick    2. Chronic hypertension affecting pregnancy  Overview:  9/19/2023: No meds currently  ASA 81 mg    Assessment & Plan:  Continue ASA 81 mg.  Initial blood pressure was elevated, however repeat was normal.  We will need to continue to monitor closely.      3. Obesity affecting pregnancy, antepartum, unspecified obesity type  Assessment & Plan:  Discussed nutrition, exercise, and appropriate weight gain in pregnancy      4. Rubella non-immune status, antepartum  Overview:  MMR postpartum              20w2d  Reassuring pregnancy progress    Counseling: Second trimester precautions  OB precautions, leaking, VB, thalia richard vs  PTL/Labor    Questions answered    Return in about 4 weeks (around 12/18/2023) for Recheck.      Andres Pruitt MD  11/20/2023

## 2023-11-20 NOTE — ASSESSMENT & PLAN NOTE
Reviewed today's anatomy ultrasound.  The anatomy appears normal and complete.  Continue prenatal vitamin  1 hour GTT next office visit

## 2023-11-20 NOTE — ASSESSMENT & PLAN NOTE
Continue ASA 81 mg.  Initial blood pressure was elevated, however repeat was normal.  We will need to continue to monitor closely.

## 2023-12-18 ENCOUNTER — ROUTINE PRENATAL (OUTPATIENT)
Dept: OBSTETRICS AND GYNECOLOGY | Facility: CLINIC | Age: 28
End: 2023-12-18
Payer: COMMERCIAL

## 2023-12-18 VITALS — DIASTOLIC BLOOD PRESSURE: 88 MMHG | WEIGHT: 231 LBS | SYSTOLIC BLOOD PRESSURE: 139 MMHG | BODY MASS INDEX: 39.63 KG/M2

## 2023-12-18 DIAGNOSIS — Z28.39 RUBELLA NON-IMMUNE STATUS, ANTEPARTUM: ICD-10-CM

## 2023-12-18 DIAGNOSIS — O10.919 CHRONIC HYPERTENSION AFFECTING PREGNANCY: ICD-10-CM

## 2023-12-18 DIAGNOSIS — O99.210 OBESITY AFFECTING PREGNANCY, ANTEPARTUM, UNSPECIFIED OBESITY TYPE: ICD-10-CM

## 2023-12-18 DIAGNOSIS — Z34.80 SUPERVISION OF OTHER NORMAL PREGNANCY, ANTEPARTUM: Primary | ICD-10-CM

## 2023-12-18 DIAGNOSIS — O09.899 RUBELLA NON-IMMUNE STATUS, ANTEPARTUM: ICD-10-CM

## 2023-12-18 LAB
GLUCOSE UR STRIP-MCNC: NEGATIVE MG/DL
PROT UR STRIP-MCNC: NEGATIVE MG/DL

## 2023-12-18 PROCEDURE — 87086 URINE CULTURE/COLONY COUNT: CPT | Performed by: OBSTETRICS & GYNECOLOGY

## 2023-12-18 NOTE — PROGRESS NOTES
OB FOLLOW UP    CC: Scheduled OB routine FU     Prenatal care complicated by:   Patient Active Problem List   Diagnosis    Anxiety    Primary hypertension    Vitamin D deficiency    Supervision of other normal pregnancy, antepartum    Chronic hypertension affecting pregnancy    Obesity affecting pregnancy, antepartum    Rubella non-immune status, antepartum       Subjective:   Patient has: No complaints, No leaking fluid, No vaginal bleeding, No contractions, Adequate FM    Objective:  Urine glucose/protein- see flow sheet      /88   Wt 105 kg (231 lb)   LMP 2023 (Approximate) Comment: Started bleeding with miscarriage this day  BMI 39.63 kg/m²   See OB flow for LE edema, and cvx exam if applicable  FHT: 148 BPM   Uterine Size:  25 cm      Assessment and Plan:  Diagnoses and all orders for this visit:    1. Supervision of other normal pregnancy, antepartum (Primary)  Overview:  EDC: 2024    Prenatal genetic screening: Nips negative    History of chronic hypertension no meds  Obesity    COVID-19 vaccine: Complete, booster recommended  Flu vaccine: Recommended  Tdap vaccine:    Assessment & Plan:  Phlebotomy is unavailable today so the patient is unable to do her glucose test today.  She does wish to return to the office to complete the glucose test prior to her next office visit.  Orders were placed for this to be done.  Tdap Rx and RSV vaccine Rx were provided today.  Continue prenatal vitamins  Fetal kick counts   labor warnings    Orders:  -     POC Urinalysis Dipstick  -     Urine Culture - Urine, Urine, Clean Catch  -     CBC (No Diff); Future  -     Glucose, Post 50 Gm Glucola; Future  -     US Ob 14 + Weeks Single or First Gestation; Future    2. Chronic hypertension affecting pregnancy  Overview:  2023: No meds currently  ASA 81 mg    Assessment & Plan:  BP remains normal.  Continue ASA 81 mg.  Continue to monitor blood pressures.      3. Obesity affecting pregnancy, antepartum,  unspecified obesity type  Assessment & Plan:  Discussed exercise, nutrition and appropriate weight gain in pregnancy.  Weight gain has been very appropriate thus far.      4. Rubella non-immune status, antepartum  Overview:  MMR postpartum              24w2d  Reassuring pregnancy progress    Counseling: Second trimester precautions  OB precautions, leaking, VB, thalia richard vs PTL/Labor    Questions answered    Return in about 4 weeks (around 1/15/2024) for Recheck.      Andres Pruitt MD  12/18/2023

## 2023-12-19 LAB — BACTERIA SPEC AEROBE CULT: ABNORMAL

## 2023-12-19 NOTE — ASSESSMENT & PLAN NOTE
Phlebotomy is unavailable today so the patient is unable to do her glucose test today.  She does wish to return to the office to complete the glucose test prior to her next office visit.  Orders were placed for this to be done.  Tdap Rx and RSV vaccine Rx were provided today.  Continue prenatal vitamins  Fetal kick counts   labor warnings

## 2023-12-19 NOTE — ASSESSMENT & PLAN NOTE
Discussed exercise, nutrition and appropriate weight gain in pregnancy.  Weight gain has been very appropriate thus far.

## 2024-01-04 ENCOUNTER — CLINICAL SUPPORT (OUTPATIENT)
Dept: OBSTETRICS AND GYNECOLOGY | Facility: CLINIC | Age: 29
End: 2024-01-04
Payer: COMMERCIAL

## 2024-01-04 DIAGNOSIS — Z34.80 SUPERVISION OF OTHER NORMAL PREGNANCY, ANTEPARTUM: Primary | ICD-10-CM

## 2024-01-04 LAB — GLUCOSE 1H P GLC SERPL-MCNC: 114 MG/DL (ref 65–139)

## 2024-01-04 PROCEDURE — 85027 COMPLETE CBC AUTOMATED: CPT | Performed by: OBSTETRICS & GYNECOLOGY

## 2024-01-04 PROCEDURE — 82950 GLUCOSE TEST: CPT | Performed by: OBSTETRICS & GYNECOLOGY

## 2024-01-04 NOTE — PATIENT INSTRUCTIONS
Venipuncture Blood Specimen Collection  Venipuncture performed in left arm by Francoise Simental with good hemostasis. Patient tolerated the procedure well without complications.   01/04/24   Francoise Simental

## 2024-01-05 ENCOUNTER — TELEPHONE (OUTPATIENT)
Dept: OBSTETRICS AND GYNECOLOGY | Facility: CLINIC | Age: 29
End: 2024-01-05
Payer: COMMERCIAL

## 2024-01-05 DIAGNOSIS — O99.019 MATERNAL ANEMIA IN PREGNANCY, ANTEPARTUM: Primary | ICD-10-CM

## 2024-01-05 LAB
DEPRECATED RDW RBC AUTO: 46 FL (ref 37–54)
ERYTHROCYTE [DISTWIDTH] IN BLOOD BY AUTOMATED COUNT: 13.7 % (ref 12.3–15.4)
HCT VFR BLD AUTO: 29.4 % (ref 34–46.6)
HGB BLD-MCNC: 9.6 G/DL (ref 12–15.9)
MCH RBC QN AUTO: 30.7 PG (ref 26.6–33)
MCHC RBC AUTO-ENTMCNC: 32.7 G/DL (ref 31.5–35.7)
MCV RBC AUTO: 93.9 FL (ref 79–97)
PLATELET # BLD AUTO: 243 10*3/MM3 (ref 140–450)
PMV BLD AUTO: 9.3 FL (ref 6–12)
RBC # BLD AUTO: 3.13 10*6/MM3 (ref 3.77–5.28)
WBC NRBC COR # BLD AUTO: 8.48 10*3/MM3 (ref 3.4–10.8)

## 2024-01-05 RX ORDER — FERROUS SULFATE 325(65) MG
325 TABLET ORAL EVERY OTHER DAY
Qty: 30 TABLET | Refills: 3 | Status: SHIPPED | OUTPATIENT
Start: 2024-01-05

## 2024-01-05 NOTE — TELEPHONE ENCOUNTER
----- Message from Andres Pruitt MD sent at 1/5/2024  7:31 AM EST -----  Please notify the patient that she has fairly significant anemia.  This is common during pregnancy.  I have sent a prescription for iron tablets to her pharmacy to treat the anemia.  She will take 1 tablet every other day.  Please stressed the importance of taking her medication.

## 2024-01-12 ENCOUNTER — HOSPITAL ENCOUNTER (OUTPATIENT)
Facility: HOSPITAL | Age: 29
Discharge: HOME | End: 2024-01-13
Attending: OBSTETRICS & GYNECOLOGY | Admitting: OBSTETRICS & GYNECOLOGY
Payer: COMMERCIAL

## 2024-01-12 VITALS
DIASTOLIC BLOOD PRESSURE: 67 MMHG | HEART RATE: 90 BPM | SYSTOLIC BLOOD PRESSURE: 118 MMHG | OXYGEN SATURATION: 96 % | RESPIRATION RATE: 18 BRPM

## 2024-01-12 LAB
BILIRUB BLD-MCNC: NEGATIVE MG/DL
CLARITY, POC: ABNORMAL
COLOR UR: ABNORMAL
DEPRECATED RDW RBC AUTO: 49.4 FL (ref 37–54)
ERYTHROCYTE [DISTWIDTH] IN BLOOD BY AUTOMATED COUNT: 14.7 % (ref 12.3–15.4)
GLUCOSE UR STRIP-MCNC: NEGATIVE MG/DL
HCT VFR BLD AUTO: 27.7 % (ref 34–46.6)
HGB BLD-MCNC: 9.4 G/DL (ref 12–15.9)
KETONES UR QL: NEGATIVE
LEUKOCYTE EST, POC: ABNORMAL
MCH RBC QN AUTO: 31.4 PG (ref 26.6–33)
MCHC RBC AUTO-ENTMCNC: 33.9 G/DL (ref 31.5–35.7)
MCV RBC AUTO: 92.6 FL (ref 79–97)
NITRITE UR-MCNC: NEGATIVE MG/ML
PH UR: 6 [PH] (ref 5–8)
PLATELET # BLD AUTO: 250 10*3/MM3 (ref 140–450)
PMV BLD AUTO: 10.2 FL (ref 6–12)
PROT UR STRIP-MCNC: NEGATIVE MG/DL
RBC # BLD AUTO: 2.99 10*6/MM3 (ref 3.77–5.28)
RBC # UR STRIP: NEGATIVE /UL
SP GR UR: 1.02 (ref 1–1.03)
UROBILINOGEN UR QL: ABNORMAL
WBC NRBC COR # BLD AUTO: 10.55 10*3/MM3 (ref 3.4–10.8)

## 2024-01-12 PROCEDURE — 96360 HYDRATION IV INFUSION INIT: CPT

## 2024-01-12 PROCEDURE — 85027 COMPLETE CBC AUTOMATED: CPT | Performed by: OBSTETRICS & GYNECOLOGY

## 2024-01-12 PROCEDURE — 81002 URINALYSIS NONAUTO W/O SCOPE: CPT | Performed by: OBSTETRICS & GYNECOLOGY

## 2024-01-12 PROCEDURE — G0463 HOSPITAL OUTPT CLINIC VISIT: HCPCS

## 2024-01-12 PROCEDURE — 25810000003 LACTATED RINGERS PER 1000 ML: Performed by: OBSTETRICS & GYNECOLOGY

## 2024-01-12 RX ORDER — SODIUM CHLORIDE, SODIUM LACTATE, POTASSIUM CHLORIDE, CALCIUM CHLORIDE 600; 310; 30; 20 MG/100ML; MG/100ML; MG/100ML; MG/100ML
999 INJECTION, SOLUTION INTRAVENOUS ONCE
Status: COMPLETED | OUTPATIENT
Start: 2024-01-13 | End: 2024-01-13

## 2024-01-12 RX ADMIN — SODIUM CHLORIDE, POTASSIUM CHLORIDE, SODIUM LACTATE AND CALCIUM CHLORIDE 999 ML/HR: 600; 310; 30; 20 INJECTION, SOLUTION INTRAVENOUS at 23:45

## 2024-01-13 PROCEDURE — G0463 HOSPITAL OUTPT CLINIC VISIT: HCPCS

## 2024-01-13 PROCEDURE — 59025 FETAL NON-STRESS TEST: CPT

## 2024-01-13 NOTE — NURSING NOTE
Discharge instructions and written material reviewed with patient and spouse, both verbalized understanding and had no questions. Home undelivered in stable condition per private vehicle.

## 2024-01-13 NOTE — NON STRESS TEST
Obstetrical Non-stress Test Interpretation     Name:  Sherri SEBASTIAN  MRN: [id number]    28 y.o. female  at 28w0d    Indication: abdominal pain      Fetal Assessment  Fetal Movement: active  Fetal HR Assessment Method: external  Fetal HR (beats/min): 125  Fetal HR Baseline: normal range  Fetal HR Variability: moderate (amplitude range 6 to 25 bpm)  Fetal HR Accelerations: greater than/equal to 15 bpm, lasting at least 15 seconds  Fetal HR Decelerations: absent  Sinusoidal Pattern Present: absent    /67   Pulse 90   Resp 18   LMP 2023 (Approximate) Comment: Started bleeding with miscarriage this day  SpO2 96%     Reason for test: OB Triage (abdominal pain)  Date of Test: 2024  Time frame of test:   RN NST Interpretation: Duane Talbot RN  2024  01:14 EST

## 2024-01-14 NOTE — OBED NOTES
EL Smallwood  Obstetric History and Physical    Chief Complaint   Patient presents with    Abdominal Pain     Sharp L upper abd pain    Shortness of Breath    Headache       Subjective     Patient is a 28 y.o. female  currently at 28w1d, who presents with complaint of left-sided abdominal pain.  Positive fetal movement.  No vaginal bleeding.  No loss of fluid.  No fever or chills.  No dysuria.  Patient reports history of constipation.  She reports eating dumplings prior to the pain starting.  No nausea or vomiting.    Her prenatal care is benign.  Her previous obstetric/gynecological history is noted for is non-contributory.    The following portions of the patients history were reviewed and updated as appropriate: current medications, allergies, past medical history, past surgical history, past family history, past social history, and problem list .       Prenatal Information:  Prenatal Results       Initial Prenatal Labs       Test Value Reference Range Date Time    Hemoglobin  12.6 g/dL 12.0 - 15.9 23 1513    Hematocrit  37.0 % 34.0 - 46.6 23 1513    Platelets  270 10*3/mm3 140 - 450 23 1513    Rubella IgG  <0.90 index Immune >0.99 23 1513    Hepatitis B SAg  Non-Reactive  Non-Reactive 23 1513    Hepatitis C Ab  Non-Reactive  Non-Reactive 23 1513    RPR        T. Pallidum Ab   Non-Reactive  Non-Reactive 23 1513    ABO  O   23 1513    Rh  Positive   23 1513    Antibody Screen  Negative   23 1513    HIV  Non-Reactive  Non-Reactive 23 1513    Urine Culture  >100,000 CFU/mL Mixed Gram Positive Mariela   23 1309       >100,000 CFU/mL Mixed Gram Positive Mariela   23 1504    Gonorrhea  Negative  Negative 23 1504    Chlamydia  Negative  Negative 23 1504    TSH  1.450 uIU/mL 0.270 - 4.200 22 0801    HgB A1c         Varicella IgG        HgB Electrophoresis         Cystic fibrosis                   Fetal testing        Test  Value Reference Range Date Time    NIPT        MSAFP        AFP-4                  2nd and 3rd Trimester       Test Value Reference Range Date Time    Hemoglobin (repeated)  9.4 g/dL 12.0 - 15.9 01/12/24 2331       9.6 g/dL 12.0 - 15.9 01/04/24 1514    Hematocrit (repeated)  27.7 % 34.0 - 46.6 01/12/24 2331       29.4 % 34.0 - 46.6 01/04/24 1514    Platelets   250 10*3/mm3 140 - 450 01/12/24 2331       243 10*3/mm3 140 - 450 01/04/24 1514       270 10*3/mm3 140 - 450 09/19/23 1513    GCT  114 mg/dL 65 - 139 01/04/24 1514    Antibody Screen (repeated)        Third Trimester syphilis screen (repeated)         GTT Fasting        GTT 1 Hr        GTT 2 Hr        GTT 3 Hr        Group B Strep                  Other testing        Test Value Reference Range Date Time    Parvo IgG         CMV IgG                   Drug Screening       Test Value Reference Range Date Time    Amphetamine Screen        Barbiturate Screen        Benzodiazepine Screen        Methadone Screen        Phencyclidine Screen        Opiates Screen        THC Screen        Cocaine Screen        Propoxyphene Screen        Buprenorphine Screen        Methamphetamine Screen        Oxycodone Screen        Tricyclic Antidepressants Screen                  Legend    ^: Historical                          External Prenatal Results       Pregnancy Outside Results - Transcribed From Office Records - See Scanned Records For Details       Test Value Date Time    ABO  O  09/19/23 1513    Rh  Positive  09/19/23 1513    Antibody Screen  Negative  09/19/23 1513    Varicella IgG  602 index 06/01/20 0942    Rubella  <0.90 index 09/19/23 1513    Hgb  9.4 g/dL 01/12/24 2331       9.6 g/dL 01/04/24 1514       12.6 g/dL 09/19/23 1513    Hct  27.7 % 01/12/24 2331       29.4 % 01/04/24 1514       37.0 % 09/19/23 1513    Glucose Fasting GTT       Glucose Tolerance Test 1 hour       Glucose Tolerance Test 3 hour       Gonorrhea (discrete)  Negative  09/19/23 1504    Chlamydia  (discrete)  Negative  23 1504    RPR       VDRL       Syphilis Antibody       HBsAg  Non-Reactive  23 1513    Herpes Simplex Virus PCR       Herpes Simplex VIrus Culture       HIV  Non-Reactive  23 1513    Hep C RNA Quant PCR       Hep C Antibody  Non-Reactive  23 1513    AFP       Group B Strep       GBS Susceptibility to Clindamycin       GBS Susceptibility to Erythromycin       Fetal Fibronectin       Genetic Testing, Maternal Blood                 Drug Screening       Test Value Date Time    Urine Drug Screen       Amphetamine Screen       Barbiturate Screen       Benzodiazepine Screen       Methadone Screen       Phencyclidine Screen       Opiates Screen       THC Screen       Cocaine Screen       Propoxyphene Screen       Buprenorphine Screen       Methamphetamine Screen       Oxycodone Screen       Tricyclic Antidepressants Screen                 Legend    ^: Historical                             Past OB History:     OB History    Para Term  AB Living   3 0 0 0 2 0   SAB IAB Ectopic Molar Multiple Live Births   2 0 0 0 0 0      # Outcome Date GA Lbr Adair/2nd Weight Sex Delivery Anes PTL Lv   3 Current            2 SAB      SAB      1 SAB      SAB          Past Medical History: Past Medical History:   Diagnosis Date    Anxiety     Bipolar disorder     Fracture of foot     left foot    Hypertension     Kidney stone     Recurrent pregnancy loss, antepartum condition or complication       Past Surgical History Past Surgical History:   Procedure Laterality Date    CHOLECYSTECTOMY      URETEROSCOPY LASER LITHOTRIPSY WITH STENT INSERTION Right 2022    Procedure: URETEROSCOPY STONE BASKETING WITH URETERAL STENT INSERTION;  Surgeon: Shanta Jay MD;  Location: [facility] MAIN OR;  Service: Urology;  Laterality: Right;      Family History: Family History   Problem Relation Age of Onset    Heart disease Paternal Grandmother     Stroke Paternal Grandmother     Skin cancer  Paternal Grandmother     Diabetes Maternal Grandmother     Stroke Maternal Grandfather     Breast cancer Paternal Aunt 38    Malig Hyperthermia Neg Hx     Ovarian cancer Neg Hx     Uterine cancer Neg Hx     Cervical cancer Neg Hx     Colon cancer Neg Hx     Stomach cancer Neg Hx     Clotting disorder Neg Hx     Malig Hypertension Neg Hx       Social History:  reports that she has never smoked. She has never used smokeless tobacco.   reports that she does not currently use alcohol after a past usage of about 2.0 standard drinks of alcohol per week.   reports no history of drug use.        General ROS: Pertinent items are noted in HPI    Objective       Vital Signs Range for the last 24 hours  Temperature:     Temp Source:     BP:     Pulse:     Respirations:     SPO2:     O2 Amount (l/min):     O2 Devices     Weight:       Physical Examination: General appearance - alert, well appearing, and in no distress  Mental status - alert, oriented to person, place, and time  Chest - clear to auscultation, no wheezes, rales or rhonchi, symmetric air entry  Heart - normal rate, regular rhythm, normal S1, S2, no murmurs, rubs, clicks or gallops  Abdomen - soft, nontender, gravid  Extremities - peripheral pulses normal, no pedal edema, no clubbing or cyanosis  Skin - normal coloration and turgor, no rashes, no suspicious skin lesions noted    Presentation: Unknown   Cervix: Exam by:     Dilation:     Effacement:     Station:         Fetal Heart Rate Assessment   Method: Fetal HR Assessment Method: external   Beats/min: Fetal HR (beats/min): 125   Baseline: Fetal HR Baseline: normal range   Variability: Fetal HR Variability: moderate (amplitude range 6 to 25 bpm)   Accels: Fetal HR Accelerations: greater than/equal to 15 bpm, lasting at least 15 seconds   Decels: Fetal HR Decelerations: absent         Uterine Assessment   Method: Method: external tocotransducer   Frequency (min):     Ctx Count in 10 min:     Duration:      Intensity:         [place] Units:       GBS is unknown              Assessment:  1.  Intrauterine pregnancy at 28w1d gestation with reactive fetal status.    2.  IUP at 28 weeks estimate gestational age with abdominal pain on the left side.  Suspect GI etiology.  Patient received IV fluids and reported feeling better.  Plan:  Discharge home   labor precautions  Strict fetal kick count        William Steiner MD  2024  10:46 EST

## 2024-01-15 NOTE — PROGRESS NOTES
OB FOLLOW UP    CC: Scheduled OB routine FU     Prenatal care complicated by:   Patient Active Problem List   Diagnosis    Anxiety    Primary hypertension    Vitamin D deficiency    Supervision of other normal pregnancy, antepartum    Chronic hypertension affecting pregnancy    Obesity affecting pregnancy, antepartum    Rubella non-immune status, antepartum    Polyhydramnios in third trimester    Maternal anemia in pregnancy, antepartum       Subjective:   Patient has: No complaints, No leaking fluid, No vaginal bleeding, No contractions, Adequate FM    Objective:  Urine glucose/protein- see flow sheet      /81   Wt 108 kg (238 lb)   LMP 2023 (Approximate) Comment: Started bleeding with miscarriage this day  BMI 40.83 kg/m²   See OB flow for LE edema, and cvx exam if applicable  FHT: 141 BPM   Uterine Size:  28 cm      Assessment and Plan:  Diagnoses and all orders for this visit:    1. Supervision of other normal pregnancy, antepartum (Primary)  Overview:  EDC: 2024    Prenatal genetic screening: Nips negative    History of chronic hypertension no meds  Obesity    COVID-19 vaccine: Complete, booster recommended  Flu vaccine: Recommended  Tdap vaccine: Rx 2023  RSV Rx 2023      Assessment & Plan:  1 hour GTT was normal  Continue prenatal vitamins  Fetal kick counts   labor warnings    Orders:  -     POC Urinalysis Dipstick    2. Obesity affecting pregnancy, antepartum, unspecified obesity type    3. Rubella non-immune status, antepartum  Overview:  MMR postpartum        4. Chronic hypertension affecting pregnancy  Overview:  2023: No meds currently  ASA 81 mg    Assessment & Plan:  BP remains normal.  No meds.  Continue ASA 81 mg daily    Orders:  -     US Ob 14 + Weeks Single or First Gestation; Future    5. Polyhydramnios in third trimester complication, single or unspecified fetus  Overview:  2024: RAJ 26    Assessment & Plan:  Discussed etiologies of  polyhydramnios.  The patient's 1 hour glucose test was normal.  Her baby is EFW and down to comforter also normal.  These things point against undiagnosed gestational diabetes.  Discussed other etiologies including proliferative decreased also discussed neoplastic etiologies.  Will continue to monitor closely.      6. Maternal anemia in pregnancy, antepartum  Assessment & Plan:  Continue ferrous sulfate            28w2d  Reassuring pregnancy progress    Counseling: OB precautions, leaking, VB, thalia richard vs PTL/Labor  [facility]    Questions answered    Return in about 2 weeks (around 1/30/2024) for Recheck.      Andres Pruitt MD  01/16/2024

## 2024-01-16 ENCOUNTER — TELEPHONE (OUTPATIENT)
Dept: OBSTETRICS AND GYNECOLOGY | Facility: CLINIC | Age: 29
End: 2024-01-16

## 2024-01-16 ENCOUNTER — ROUTINE PRENATAL (OUTPATIENT)
Dept: OBSTETRICS AND GYNECOLOGY | Facility: CLINIC | Age: 29
End: 2024-01-16
Payer: COMMERCIAL

## 2024-01-16 VITALS — BODY MASS INDEX: 40.83 KG/M2 | SYSTOLIC BLOOD PRESSURE: 123 MMHG | WEIGHT: 238 LBS | DIASTOLIC BLOOD PRESSURE: 81 MMHG

## 2024-01-16 DIAGNOSIS — O99.210 OBESITY AFFECTING PREGNANCY, ANTEPARTUM, UNSPECIFIED OBESITY TYPE: ICD-10-CM

## 2024-01-16 DIAGNOSIS — O09.899 RUBELLA NON-IMMUNE STATUS, ANTEPARTUM: ICD-10-CM

## 2024-01-16 DIAGNOSIS — Z34.80 SUPERVISION OF OTHER NORMAL PREGNANCY, ANTEPARTUM: Primary | ICD-10-CM

## 2024-01-16 DIAGNOSIS — O40.3XX0 POLYHYDRAMNIOS IN THIRD TRIMESTER COMPLICATION, SINGLE OR UNSPECIFIED FETUS: ICD-10-CM

## 2024-01-16 DIAGNOSIS — O99.019 MATERNAL ANEMIA IN PREGNANCY, ANTEPARTUM: ICD-10-CM

## 2024-01-16 DIAGNOSIS — O10.919 CHRONIC HYPERTENSION AFFECTING PREGNANCY: ICD-10-CM

## 2024-01-16 DIAGNOSIS — Z28.39 RUBELLA NON-IMMUNE STATUS, ANTEPARTUM: ICD-10-CM

## 2024-01-16 LAB
GLUCOSE UR STRIP-MCNC: NEGATIVE MG/DL
PROT UR STRIP-MCNC: NEGATIVE MG/DL

## 2024-01-16 NOTE — ASSESSMENT & PLAN NOTE
Discussed etiologies of polyhydramnios.  The patient's 1 hour glucose test was normal.  Her baby is EFW and down to comforter also normal.  These things point against undiagnosed gestational diabetes.  Discussed other etiologies including proliferative decreased also discussed neoplastic etiologies.  Will continue to monitor closely.

## 2024-01-30 ENCOUNTER — ROUTINE PRENATAL (OUTPATIENT)
Dept: OBSTETRICS AND GYNECOLOGY | Facility: CLINIC | Age: 29
End: 2024-01-30
Payer: COMMERCIAL

## 2024-01-30 VITALS — DIASTOLIC BLOOD PRESSURE: 87 MMHG | BODY MASS INDEX: 40.66 KG/M2 | SYSTOLIC BLOOD PRESSURE: 138 MMHG | WEIGHT: 237 LBS

## 2024-01-30 DIAGNOSIS — O99.019 MATERNAL ANEMIA IN PREGNANCY, ANTEPARTUM: ICD-10-CM

## 2024-01-30 DIAGNOSIS — O09.899 RUBELLA NON-IMMUNE STATUS, ANTEPARTUM: ICD-10-CM

## 2024-01-30 DIAGNOSIS — Z28.39 RUBELLA NON-IMMUNE STATUS, ANTEPARTUM: ICD-10-CM

## 2024-01-30 DIAGNOSIS — O99.210 OBESITY AFFECTING PREGNANCY, ANTEPARTUM, UNSPECIFIED OBESITY TYPE: ICD-10-CM

## 2024-01-30 DIAGNOSIS — R30.0 DYSURIA: ICD-10-CM

## 2024-01-30 DIAGNOSIS — O10.919 CHRONIC HYPERTENSION AFFECTING PREGNANCY: ICD-10-CM

## 2024-01-30 DIAGNOSIS — O40.3XX0 POLYHYDRAMNIOS IN THIRD TRIMESTER COMPLICATION, SINGLE OR UNSPECIFIED FETUS: ICD-10-CM

## 2024-01-30 DIAGNOSIS — Z34.80 SUPERVISION OF OTHER NORMAL PREGNANCY, ANTEPARTUM: Primary | ICD-10-CM

## 2024-01-30 LAB
GLUCOSE UR STRIP-MCNC: NEGATIVE MG/DL
PROT UR STRIP-MCNC: NEGATIVE MG/DL

## 2024-01-30 PROCEDURE — 0502F SUBSEQUENT PRENATAL CARE: CPT | Performed by: OBSTETRICS & GYNECOLOGY

## 2024-01-30 NOTE — ASSESSMENT & PLAN NOTE
BP is normal but at the upper limits of normal.  The patient is currently taking no medications for her hypertension.  Continue to monitor closely.  Continue ASA 81 mg.  Growth ultrasound next office visit

## 2024-01-30 NOTE — PROGRESS NOTES
OB FOLLOW UP    CC: Scheduled OB routine FU     Prenatal care complicated by:   Patient Active Problem List   Diagnosis    Anxiety    Primary hypertension    Vitamin D deficiency    Supervision of other normal pregnancy, antepartum    Chronic hypertension affecting pregnancy    Obesity affecting pregnancy, antepartum    Rubella non-immune status, antepartum    Polyhydramnios in third trimester    Maternal anemia in pregnancy, antepartum       Subjective:   Patient has: No complaints, No leaking fluid, No vaginal bleeding, No contractions, Adequate FM    Objective:  Urine glucose/protein- see flow sheet      /87   Wt 108 kg (237 lb)   LMP 2023 (Approximate) Comment: Started bleeding with miscarriage this day  BMI 40.66 kg/m²   See OB flow for LE edema, and cvx exam if applicable  FHT: 136 BPM   Uterine Size:  33 cm      Assessment and Plan:  Diagnoses and all orders for this visit:    1. Supervision of other normal pregnancy, antepartum (Primary)  Overview:  EDC: 2024    Prenatal genetic screening: Nips negative    History of chronic hypertension no meds  Obesity    COVID-19 vaccine: Complete, booster recommended  Flu vaccine: Recommended  Tdap vaccine: Rx 2023  RSV Rx 2023      Assessment & Plan:  Continue prenatal vitamins  Fetal kick counts   labor warnings    Orders:  -     POC Urinalysis Dipstick    2. Obesity affecting pregnancy, antepartum, unspecified obesity type    3. Chronic hypertension affecting pregnancy  Overview:  2023: No meds currently  ASA 81 mg    Assessment & Plan:  BP is normal but at the upper limits of normal.  The patient is currently taking no medications for her hypertension.  Continue to monitor closely.  Continue ASA 81 mg.  Growth ultrasound next office visit      4. Rubella non-immune status, antepartum  Overview:  MMR postpartum        5. Polyhydramnios in third trimester complication, single or unspecified fetus  Overview:  2024: RAJ  26    Assessment & Plan:  Follow-up study next office visit      6. Dysuria  -     Urine Culture - Urine, Urine, Clean Catch; Future    7. Maternal anemia in pregnancy, antepartum  Assessment & Plan:  Continue ferrous sulfate            30w3d  Reassuring pregnancy progress    Counseling: OB precautions, leaking, VB, thalia richard vs PTL/Labor  [facility]    Questions answered    Return in about 2 weeks (around 2/13/2024) for Recheck.      Andres Pruitt MD  01/30/2024

## 2024-02-13 ENCOUNTER — ROUTINE PRENATAL (OUTPATIENT)
Dept: OBSTETRICS AND GYNECOLOGY | Facility: CLINIC | Age: 29
End: 2024-02-13
Payer: COMMERCIAL

## 2024-02-13 VITALS — DIASTOLIC BLOOD PRESSURE: 85 MMHG | BODY MASS INDEX: 41.34 KG/M2 | WEIGHT: 241 LBS | SYSTOLIC BLOOD PRESSURE: 132 MMHG

## 2024-02-13 DIAGNOSIS — O09.899 RUBELLA NON-IMMUNE STATUS, ANTEPARTUM: ICD-10-CM

## 2024-02-13 DIAGNOSIS — O99.210 OBESITY AFFECTING PREGNANCY, ANTEPARTUM, UNSPECIFIED OBESITY TYPE: ICD-10-CM

## 2024-02-13 DIAGNOSIS — O10.919 CHRONIC HYPERTENSION AFFECTING PREGNANCY: ICD-10-CM

## 2024-02-13 DIAGNOSIS — Z34.80 SUPERVISION OF OTHER NORMAL PREGNANCY, ANTEPARTUM: Primary | ICD-10-CM

## 2024-02-13 DIAGNOSIS — O40.3XX0 POLYHYDRAMNIOS IN THIRD TRIMESTER COMPLICATION, SINGLE OR UNSPECIFIED FETUS: ICD-10-CM

## 2024-02-13 DIAGNOSIS — Z28.39 RUBELLA NON-IMMUNE STATUS, ANTEPARTUM: ICD-10-CM

## 2024-02-13 DIAGNOSIS — O99.019 MATERNAL ANEMIA IN PREGNANCY, ANTEPARTUM: ICD-10-CM

## 2024-02-13 LAB
GLUCOSE UR STRIP-MCNC: NEGATIVE MG/DL
PROT UR STRIP-MCNC: NEGATIVE MG/DL

## 2024-02-26 NOTE — PROGRESS NOTES
OB FOLLOW UP    CC: Scheduled OB routine FU     Prenatal care complicated by:   Patient Active Problem List   Diagnosis    Anxiety    Primary hypertension    Vitamin D deficiency    Supervision of other normal pregnancy, antepartum    Chronic hypertension affecting pregnancy    Obesity affecting pregnancy, antepartum    Rubella non-immune status, antepartum    Maternal anemia in pregnancy, antepartum       Subjective:   Patient has: No leaking fluid, No vaginal bleeding, Adequate FM  The patient reports feeling more comfortable.  More pelvic pressure.  No cramping.  She is also complaining of increased lower extremity swelling.  Complains of severe constipation.  States that she stopped taking her iron tablets.  Constipation is better.    Objective:  Urine glucose/protein- see flow sheet      /84   Wt 111 kg (245 lb)   LMP 2023 (Approximate) Comment: Started bleeding with miscarriage this day  BMI 42.03 kg/m²   See OB flow for LE edema, and cvx exam if applicable  FHT: 138 BPM   Uterine Size:  35 cm      Assessment and Plan:  Diagnoses and all orders for this visit:    1. Supervision of other normal pregnancy, antepartum (Primary)  Overview:  EDC: 2024    Prenatal genetic screening: Nips negative    History of chronic hypertension no meds  Obesity    COVID-19 vaccine: Complete, booster recommended  Flu vaccine: Recommended  Tdap vaccine: Rx 2023  RSV Rx 2023      Assessment & Plan:  Continue prenatal vitamins  Fetal kick counts   labor warnings    Orders:  -     POC Urinalysis Dipstick    2. Rubella non-immune status, antepartum  Overview:  MMR postpartum        3. Obesity affecting pregnancy, antepartum, unspecified obesity type    4. Chronic hypertension affecting pregnancy  Overview:  2023: No meds currently  ASA 81 mg    Assessment & Plan:  Patient BP is in the mild range.  Plan to check CBC, CMP and urine PC ratio.  Plan to start NSTs.  At this point I do not think  medications are necessary.  If the patient continues to be persistently elevated then I would consider instituting medications.    Orders:  -     Fetal Nonstress Test; Standing  -     Comprehensive Metabolic Panel; Future  -     Protein / Creatinine Ratio, Urine - Urine, Clean Catch    5. Maternal anemia in pregnancy, antepartum  Assessment & Plan:  Recommend the patient resume taking her iron tablets.  We discussed the risks of severe anemia in pregnancy.  Increased risk of blood transfusion and morbidity associated with acute blood loss and delivery.    Orders:  -     Ferritin  -     Iron Profile  -     Reticulocytes  -     CBC (No Diff)          34w2d  Reassuring pregnancy progress    Counseling: OB precautions, leaking, VB, thalia richard vs PTL/Labor  [facility]  HTN precautions reviewed: HA, vision change, RUQ/epigastric pain, edema    Questions answered    Return in about 2 weeks (around 3/12/2024) for Recheck.      Andres Pruitt MD  02/27/2024

## 2024-02-27 ENCOUNTER — ROUTINE PRENATAL (OUTPATIENT)
Dept: OBSTETRICS AND GYNECOLOGY | Facility: CLINIC | Age: 29
End: 2024-02-27
Payer: COMMERCIAL

## 2024-02-27 VITALS — SYSTOLIC BLOOD PRESSURE: 130 MMHG | DIASTOLIC BLOOD PRESSURE: 86 MMHG | BODY MASS INDEX: 42.03 KG/M2 | WEIGHT: 245 LBS

## 2024-02-27 DIAGNOSIS — O99.019 MATERNAL ANEMIA IN PREGNANCY, ANTEPARTUM: ICD-10-CM

## 2024-02-27 DIAGNOSIS — O09.899 RUBELLA NON-IMMUNE STATUS, ANTEPARTUM: ICD-10-CM

## 2024-02-27 DIAGNOSIS — O99.210 OBESITY AFFECTING PREGNANCY, ANTEPARTUM, UNSPECIFIED OBESITY TYPE: ICD-10-CM

## 2024-02-27 DIAGNOSIS — O10.919 CHRONIC HYPERTENSION AFFECTING PREGNANCY: ICD-10-CM

## 2024-02-27 DIAGNOSIS — Z28.39 RUBELLA NON-IMMUNE STATUS, ANTEPARTUM: ICD-10-CM

## 2024-02-27 DIAGNOSIS — Z34.80 SUPERVISION OF OTHER NORMAL PREGNANCY, ANTEPARTUM: Primary | ICD-10-CM

## 2024-02-27 LAB
ALBUMIN SERPL-MCNC: 3.5 G/DL (ref 3.5–5.2)
ALBUMIN/GLOB SERPL: 1.3 G/DL
ALP SERPL-CCNC: 88 U/L (ref 39–117)
ALT SERPL W P-5'-P-CCNC: 14 U/L (ref 1–33)
ANION GAP SERPL CALCULATED.3IONS-SCNC: 11 MMOL/L (ref 5–15)
AST SERPL-CCNC: 13 U/L (ref 1–32)
BILIRUB SERPL-MCNC: 0.2 MG/DL (ref 0–1.2)
BUN SERPL-MCNC: 11 MG/DL (ref 6–20)
BUN/CREAT SERPL: 10.6 (ref 7–25)
CALCIUM SPEC-SCNC: 9.7 MG/DL (ref 8.6–10.5)
CHLORIDE SERPL-SCNC: 105 MMOL/L (ref 98–107)
CO2 SERPL-SCNC: 20 MMOL/L (ref 22–29)
CREAT SERPL-MCNC: 1.04 MG/DL (ref 0.57–1)
DEPRECATED RDW RBC AUTO: 47.9 FL (ref 37–54)
EGFRCR SERPLBLD CKD-EPI 2021: 74.3 ML/MIN/1.73
ERYTHROCYTE [DISTWIDTH] IN BLOOD BY AUTOMATED COUNT: 14.2 % (ref 12.3–15.4)
FERRITIN SERPL-MCNC: 63.6 NG/ML (ref 13–150)
GLOBULIN UR ELPH-MCNC: 2.8 GM/DL
GLUCOSE SERPL-MCNC: 84 MG/DL (ref 65–99)
GLUCOSE UR STRIP-MCNC: NEGATIVE MG/DL
HCT VFR BLD AUTO: 29.6 % (ref 34–46.6)
HGB BLD-MCNC: 10 G/DL (ref 12–15.9)
IRON 24H UR-MRATE: 72 MCG/DL (ref 37–145)
IRON SATN MFR SERPL: 16 % (ref 20–50)
MCH RBC QN AUTO: 31.5 PG (ref 26.6–33)
MCHC RBC AUTO-ENTMCNC: 33.8 G/DL (ref 31.5–35.7)
MCV RBC AUTO: 93.4 FL (ref 79–97)
PLATELET # BLD AUTO: 235 10*3/MM3 (ref 140–450)
PMV BLD AUTO: 9.1 FL (ref 6–12)
POTASSIUM SERPL-SCNC: 4.2 MMOL/L (ref 3.5–5.2)
PROT SERPL-MCNC: 6.3 G/DL (ref 6–8.5)
PROT UR STRIP-MCNC: NEGATIVE MG/DL
RBC # BLD AUTO: 3.17 10*6/MM3 (ref 3.77–5.28)
RETICS # AUTO: 0.09 10*6/MM3 (ref 0.02–0.13)
RETICS/RBC NFR AUTO: 2.71 % (ref 0.7–1.9)
SODIUM SERPL-SCNC: 136 MMOL/L (ref 136–145)
TIBC SERPL-MCNC: 457 MCG/DL (ref 298–536)
TRANSFERRIN SERPL-MCNC: 307 MG/DL (ref 200–360)
WBC NRBC COR # BLD AUTO: 9.09 10*3/MM3 (ref 3.4–10.8)

## 2024-02-27 PROCEDURE — 85045 AUTOMATED RETICULOCYTE COUNT: CPT | Performed by: OBSTETRICS & GYNECOLOGY

## 2024-02-27 PROCEDURE — 85027 COMPLETE CBC AUTOMATED: CPT | Performed by: OBSTETRICS & GYNECOLOGY

## 2024-02-27 PROCEDURE — 83540 ASSAY OF IRON: CPT | Performed by: OBSTETRICS & GYNECOLOGY

## 2024-02-27 PROCEDURE — 84466 ASSAY OF TRANSFERRIN: CPT | Performed by: OBSTETRICS & GYNECOLOGY

## 2024-02-27 PROCEDURE — 80053 COMPREHEN METABOLIC PANEL: CPT | Performed by: OBSTETRICS & GYNECOLOGY

## 2024-02-27 PROCEDURE — 82728 ASSAY OF FERRITIN: CPT | Performed by: OBSTETRICS & GYNECOLOGY

## 2024-02-27 NOTE — ASSESSMENT & PLAN NOTE
Patient BP is in the mild range.  Plan to check CBC, CMP and urine PC ratio.  Plan to start NSTs.  At this point I do not think medications are necessary.  If the patient continues to be persistently elevated then I would consider instituting medications.

## 2024-02-27 NOTE — ASSESSMENT & PLAN NOTE
Recommend the patient resume taking her iron tablets.  We discussed the risks of severe anemia in pregnancy.  Increased risk of blood transfusion and morbidity associated with acute blood loss and delivery.

## 2024-02-29 ENCOUNTER — TELEPHONE (OUTPATIENT)
Dept: OBSTETRICS AND GYNECOLOGY | Facility: CLINIC | Age: 29
End: 2024-02-29
Payer: COMMERCIAL

## 2024-02-29 NOTE — TELEPHONE ENCOUNTER
The patient called and said she had some light pink spotting, just very little, on her toilet paper when she wiped and minor cramping that happened sporadically.  She is not having anymore cramping now and doesn't notice any more blood.  I told her to monitor and if she starts to have signs of an infection, heavy bleeding, major cramping or increased pain, or the baby is not moving well, she would need to be evaluated at [facility].  Otherwise, it is ok to keep her NST appointment tomorrow at L[facility]D.  The pt understood this information.

## 2024-03-01 ENCOUNTER — HOSPITAL ENCOUNTER (OUTPATIENT)
Facility: HOSPITAL | Age: 29
Discharge: HOME | End: 2024-03-01
Attending: OBSTETRICS & GYNECOLOGY | Admitting: OBSTETRICS & GYNECOLOGY
Payer: COMMERCIAL

## 2024-03-01 VITALS — HEART RATE: 95 BPM | OXYGEN SATURATION: 100 % | DIASTOLIC BLOOD PRESSURE: 80 MMHG | SYSTOLIC BLOOD PRESSURE: 128 MMHG

## 2024-03-01 PROCEDURE — 59025 FETAL NON-STRESS TEST: CPT

## 2024-03-01 PROCEDURE — 59025 FETAL NON-STRESS TEST: CPT | Performed by: OBSTETRICS & GYNECOLOGY

## 2024-03-01 NOTE — NON STRESS TEST
Obstetrical Non-stress Test Interpretation     Name:  Sherri SEBASTIAN  MRN: [id number]    29 y.o. female  at 34w6d    Indication: CHTN      Fetal Assessment  Fetal Movement: active  Fetal HR Assessment Method: external  Fetal HR (beats/min): 125  Fetal HR Baseline: normal range  Fetal HR Variability: moderate (amplitude range 6 to 25 bpm)  Fetal HR Accelerations: greater than/equal to 15 bpm, lasting at least 15 seconds  Fetal HR Decelerations: absent  Sinusoidal Pattern Present: absent  Fetal HR Tracing Category: Category I    /80   Pulse 95   LMP 2023 (Approximate) Comment: Started bleeding with miscarriage this day  SpO2 100%     Reason for test: OB Triage, Hypertension  Date of Test: 3/1/2024  Time frame of test: 0564-2656  RN NST Interpretation: Reactive      Osbaldo Rutledge RN  3/1/2024  16:03 EST

## 2024-03-06 ENCOUNTER — ROUTINE PRENATAL (OUTPATIENT)
Dept: OBSTETRICS AND GYNECOLOGY | Facility: CLINIC | Age: 29
End: 2024-03-06
Payer: COMMERCIAL

## 2024-03-06 VITALS — BODY MASS INDEX: 42.37 KG/M2 | SYSTOLIC BLOOD PRESSURE: 133 MMHG | WEIGHT: 247 LBS | DIASTOLIC BLOOD PRESSURE: 86 MMHG

## 2024-03-06 DIAGNOSIS — O99.019 MATERNAL ANEMIA IN PREGNANCY, ANTEPARTUM: ICD-10-CM

## 2024-03-06 DIAGNOSIS — Z28.39 RUBELLA NON-IMMUNE STATUS, ANTEPARTUM: ICD-10-CM

## 2024-03-06 DIAGNOSIS — O09.899 RUBELLA NON-IMMUNE STATUS, ANTEPARTUM: ICD-10-CM

## 2024-03-06 DIAGNOSIS — O99.210 OBESITY AFFECTING PREGNANCY, ANTEPARTUM, UNSPECIFIED OBESITY TYPE: ICD-10-CM

## 2024-03-06 DIAGNOSIS — O10.919 CHRONIC HYPERTENSION AFFECTING PREGNANCY: ICD-10-CM

## 2024-03-06 DIAGNOSIS — Z34.80 SUPERVISION OF OTHER NORMAL PREGNANCY, ANTEPARTUM: Primary | ICD-10-CM

## 2024-03-06 LAB
CREAT UR-MCNC: 60.5 MG/DL
GLUCOSE UR STRIP-MCNC: NEGATIVE MG/DL
PROT ?TM UR-MCNC: 9.4 MG/DL
PROT UR STRIP-MCNC: NEGATIVE MG/DL
PROT/CREAT UR: 0.16 MG/G{CREAT}

## 2024-03-06 PROCEDURE — 82570 ASSAY OF URINE CREATININE: CPT | Performed by: OBSTETRICS & GYNECOLOGY

## 2024-03-06 PROCEDURE — 84156 ASSAY OF PROTEIN URINE: CPT | Performed by: OBSTETRICS & GYNECOLOGY

## 2024-03-06 PROCEDURE — 87081 CULTURE SCREEN ONLY: CPT | Performed by: OBSTETRICS & GYNECOLOGY

## 2024-03-06 NOTE — ASSESSMENT & PLAN NOTE
BP is slightly elevated initially here today.  Patient reports BPs are normal at home as well as at her NST.  Repeat BP was improved.  Continue to monitor blood pressure at home and as well as NSTs.  Patient will likely need delivery between 38 and 39 weeks gestation depending on how her blood pressure is doing for the remainder of the pregnancy  Continue weekly NSTs.  Pre eclampsia warnings

## 2024-03-06 NOTE — PROGRESS NOTES
OB FOLLOW UP    CC: Scheduled OB routine FU     Prenatal care complicated by:   Patient Active Problem List   Diagnosis    Anxiety    Primary hypertension    Vitamin D deficiency    Supervision of other normal pregnancy, antepartum    Chronic hypertension affecting pregnancy    Obesity affecting pregnancy, antepartum    Rubella non-immune status, antepartum    Maternal anemia in pregnancy, antepartum       Subjective:   Patient has: No leaking fluid, No vaginal bleeding, Adequate FM  Reports increased lower extremity swelling as well as pelvic cramping    Objective:  Urine glucose/protein- see flow sheet      /86   Wt 112 kg (247 lb)   LMP 06/18/2023 (Approximate) Comment: Started bleeding with miscarriage this day  BMI 42.37 kg/m²   See OB flow for LE edema, and cvx exam if applicable  FHT: 132 BPM   Uterine Size:  37 cm      Assessment and Plan:  Diagnoses and all orders for this visit:    1. Supervision of other normal pregnancy, antepartum (Primary)  Overview:  EDC: 4/6/2024    Prenatal genetic screening: Nips negative    History of chronic hypertension no meds  Obesity    COVID-19 vaccine: Complete, booster recommended  Flu vaccine: Recommended  Tdap vaccine: Rx 12/18/2023  RSV Rx 12/18/2023      Assessment & Plan:  GBS collected  Continue prenatal vitamins  Fetal kick counts  Labor instructions    Orders:  -     POC Urinalysis Dipstick  -     Group B Streptococcus Culture - Swab, Vaginal/Rectum    2. Rubella non-immune status, antepartum  Overview:  MMR postpartum        3. Obesity affecting pregnancy, antepartum, unspecified obesity type  Assessment & Plan:  Continue NSTs        4. Maternal anemia in pregnancy, antepartum  Assessment & Plan:  Strongly encouraged the patient to continue her ferrous sulfate      5. Chronic hypertension affecting pregnancy  Overview:  9/19/2023: No meds currently  ASA 81 mg    Assessment & Plan:  BP is slightly elevated initially here today.  Patient reports BPs are  normal at home as well as at her NST.  Repeat BP was improved.  Continue to monitor blood pressure at home and as well as NSTs.  Patient will likely need delivery between 38 and 39 weeks gestation depending on how her blood pressure is doing for the remainder of the pregnancy  Continue weekly NSTs.  Pre eclampsia warnings            35w4d  Reassuring pregnancy progress    Counseling: OB precautions, leaking, VB, thalia richard vs PTL/Labor  [facility]  HTN precautions reviewed: HA, vision change, RUQ/epigastric pain, edema    Questions answered    Return in about 1 week (around 3/13/2024) for Recheck.      Andres Pruitt MD  03/06/2024

## 2024-03-08 ENCOUNTER — HOSPITAL ENCOUNTER (OUTPATIENT)
Facility: HOSPITAL | Age: 29
Discharge: HOME | End: 2024-03-08
Attending: OBSTETRICS & GYNECOLOGY | Admitting: OBSTETRICS & GYNECOLOGY
Payer: COMMERCIAL

## 2024-03-08 ENCOUNTER — HOSPITAL ENCOUNTER (OUTPATIENT)
Dept: LABOR AND DELIVERY | Facility: HOSPITAL | Age: 29
Discharge: HOME | End: 2024-03-08
Payer: COMMERCIAL

## 2024-03-08 VITALS — SYSTOLIC BLOOD PRESSURE: 137 MMHG | HEART RATE: 100 BPM | RESPIRATION RATE: 16 BRPM | DIASTOLIC BLOOD PRESSURE: 78 MMHG

## 2024-03-08 LAB — BACTERIA SPEC AEROBE CULT: NORMAL

## 2024-03-08 PROCEDURE — 59025 FETAL NON-STRESS TEST: CPT

## 2024-03-08 NOTE — NON STRESS TEST
Obstetrical Non-stress Test Interpretation     Name:  Sherri SEBASTIAN  MRN: [id number]    29 y.o. female  at 35w6d    Indication: Chronic HTN      Fetal Assessment  Fetal Movement: active  Fetal HR Assessment Method: external  Fetal HR (beats/min): 140  Fetal HR Baseline: normal range  Fetal HR Variability: moderate (amplitude range 6 to 25 bpm)  Fetal HR Accelerations: greater than/equal to 15 bpm, lasting at least 15 seconds  Fetal HR Decelerations: absent  Sinusoidal Pattern Present: absent  Fetal HR Tracing Category: Category I    Blood Pressure 137/78   Pulse 100   Respiration 16   Last Menstrual Period 2023 (Approximate) Comment: Started bleeding with miscarriage this day    Reason for test: Hypertension  Date of Test: 3/8/2024  Time frame of test: 4322-9418  RN NST Interpretation: Reactive      Silva Alba RN  3/8/2024  14:23 EST

## 2024-03-08 NOTE — NON STRESS TEST
Obstetrical Non-stress Test Interpretation     Name:  Sherri SEBASTIAN  MRN: [id number]    29 y.o. female  at 35w6d    Indication: 35 weeks 6 days; scheduled NST; CHTN       Baseline: Normal 110-160 bpm  Variability:   Moderate/Normal (amplitude 6-25 bpm)  Accelerations: Present (32 weeks+) 15 x 15 bpm  Decelerations: Absent   Contractions:  Absent       Impression:  Category I       Radha Rizzo MD  3/8/2024  14:56 EST

## 2024-03-12 ENCOUNTER — ROUTINE PRENATAL (OUTPATIENT)
Dept: OBSTETRICS AND GYNECOLOGY | Facility: CLINIC | Age: 29
End: 2024-03-12
Payer: COMMERCIAL

## 2024-03-12 VITALS — DIASTOLIC BLOOD PRESSURE: 90 MMHG | SYSTOLIC BLOOD PRESSURE: 135 MMHG | BODY MASS INDEX: 42.89 KG/M2 | WEIGHT: 250 LBS

## 2024-03-12 DIAGNOSIS — O09.899 RUBELLA NON-IMMUNE STATUS, ANTEPARTUM: ICD-10-CM

## 2024-03-12 DIAGNOSIS — O99.019 MATERNAL ANEMIA IN PREGNANCY, ANTEPARTUM: ICD-10-CM

## 2024-03-12 DIAGNOSIS — Z34.80 SUPERVISION OF OTHER NORMAL PREGNANCY, ANTEPARTUM: Primary | ICD-10-CM

## 2024-03-12 DIAGNOSIS — O99.210 OBESITY AFFECTING PREGNANCY, ANTEPARTUM, UNSPECIFIED OBESITY TYPE: ICD-10-CM

## 2024-03-12 DIAGNOSIS — O10.919 CHRONIC HYPERTENSION AFFECTING PREGNANCY: ICD-10-CM

## 2024-03-12 DIAGNOSIS — Z28.39 RUBELLA NON-IMMUNE STATUS, ANTEPARTUM: ICD-10-CM

## 2024-03-12 LAB
GLUCOSE UR STRIP-MCNC: NEGATIVE MG/DL
PROT UR STRIP-MCNC: NEGATIVE MG/DL

## 2024-03-12 PROCEDURE — 0502F SUBSEQUENT PRENATAL CARE: CPT | Performed by: OBSTETRICS & GYNECOLOGY

## 2024-03-12 NOTE — PROGRESS NOTES
OB FOLLOW UP    CC: Scheduled OB routine FU     Prenatal care complicated by:   Patient Active Problem List   Diagnosis    Anxiety    Primary hypertension    Vitamin D deficiency    Supervision of other normal pregnancy, antepartum    Chronic hypertension affecting pregnancy    Obesity affecting pregnancy, antepartum    Rubella non-immune status, antepartum    Maternal anemia in pregnancy, antepartum       Subjective:   Patient has: No leaking fluid, No vaginal bleeding, Adequate FM  Reports pain in her right lower back and wraps around to the abdomen.  Also has pain in the right mid back.  Complains of pelvic pressure and increased swelling.    Objective:  Urine glucose/protein- see flow sheet      /90   Wt 113 kg (250 lb)   LMP 06/18/2023 (Approximate) Comment: Started bleeding with miscarriage this day  BMI 42.89 kg/m²   See OB flow for LE edema, and cvx exam if applicable  FHT: 132 BPM   Uterine Size:  38 cm      Assessment and Plan:  Diagnoses and all orders for this visit:    1. Supervision of other normal pregnancy, antepartum (Primary)  Overview:  EDC: 4/6/2024    Prenatal genetic screening: Nips negative    History of chronic hypertension no meds  Obesity    COVID-19 vaccine: Complete, booster recommended  Flu vaccine: Recommended  Tdap vaccine: Rx 12/18/2023  RSV Rx 12/18/2023      Assessment & Plan:  Continue prenatal vitamins  Fetal kick counts  Labor instructions    Orders:  -     POC Urinalysis Dipstick    2. Rubella non-immune status, antepartum  Overview:  MMR postpartum        3. Obesity affecting pregnancy, antepartum, unspecified obesity type    4. Chronic hypertension affecting pregnancy  Overview:  9/19/2023: No meds currently  ASA 81 mg    Assessment & Plan:  BP remains stable.  Patient remains off of antihypertensives.  Blood pressure is just in the mild range today.  Will continue monitor closely.  Continue NSTs.  Continue ASA 81 mg.      5. Maternal anemia in pregnancy,  antepartum  Assessment & Plan:  Continue ferrous sulfate            36w3d  Reassuring pregnancy progress    Counseling: OB precautions, leaking, VB, thalia richard vs PTL/Labor  [facility]    Questions answered    Return in about 1 week (around 3/19/2024) for Recheck.      Andres Pruitt MD  03/12/2024

## 2024-03-12 NOTE — ASSESSMENT & PLAN NOTE
BP remains stable.  Patient remains off of antihypertensives.  Blood pressure is just in the mild range today.  Will continue monitor closely.  Continue NSTs.  Continue ASA 81 mg.

## 2024-03-15 ENCOUNTER — HOSPITAL ENCOUNTER (OUTPATIENT)
Facility: HOSPITAL | Age: 29
Discharge: HOME | End: 2024-03-15
Attending: STUDENT IN AN ORGANIZED HEALTH CARE EDUCATION/TRAINING PROGRAM | Admitting: STUDENT IN AN ORGANIZED HEALTH CARE EDUCATION/TRAINING PROGRAM
Payer: COMMERCIAL

## 2024-03-15 ENCOUNTER — HOSPITAL ENCOUNTER (OUTPATIENT)
Dept: LABOR AND DELIVERY | Facility: HOSPITAL | Age: 29
Discharge: HOME | End: 2024-03-15
Payer: COMMERCIAL

## 2024-03-15 ENCOUNTER — HOSPITAL ENCOUNTER (INPATIENT)
Facility: HOSPITAL | Age: 29
LOS: 3 days | Discharge: HOME | End: 2024-03-18
Attending: STUDENT IN AN ORGANIZED HEALTH CARE EDUCATION/TRAINING PROGRAM | Admitting: STUDENT IN AN ORGANIZED HEALTH CARE EDUCATION/TRAINING PROGRAM
Payer: COMMERCIAL

## 2024-03-15 VITALS
TEMPERATURE: 98.5 F | SYSTOLIC BLOOD PRESSURE: 137 MMHG | HEART RATE: 102 BPM | DIASTOLIC BLOOD PRESSURE: 93 MMHG | HEIGHT: 66 IN | BODY MASS INDEX: 40.35 KG/M2 | RESPIRATION RATE: 18 BRPM | OXYGEN SATURATION: 100 %

## 2024-03-15 PROBLEM — O42.919 PRETERM PREMATURE RUPTURE OF MEMBRANES: Status: ACTIVE | Noted: 2024-03-15

## 2024-03-15 LAB
A1 MICROGLOB PLACENTAL VAG QL: POSITIVE
AMPHET+METHAMPHET UR QL: NEGATIVE
BARBITURATES UR QL SCN: NEGATIVE
BENZODIAZ UR QL SCN: NEGATIVE
CANNABINOIDS SERPL QL: NEGATIVE
COCAINE UR QL: NEGATIVE
DEPRECATED RDW RBC AUTO: 53.8 FL (ref 37–54)
ERYTHROCYTE [DISTWIDTH] IN BLOOD BY AUTOMATED COUNT: 15.9 % (ref 12.3–15.4)
FENTANYL UR-MCNC: NEGATIVE NG/ML
HCT VFR BLD AUTO: 30.3 % (ref 34–46.6)
HGB BLD-MCNC: 10.2 G/DL (ref 12–15.9)
MCH RBC QN AUTO: 31.7 PG (ref 26.6–33)
MCHC RBC AUTO-ENTMCNC: 33.7 G/DL (ref 31.5–35.7)
MCV RBC AUTO: 94.1 FL (ref 79–97)
METHADONE UR QL SCN: NEGATIVE
OPIATES UR QL: NEGATIVE
OXYCODONE UR QL SCN: NEGATIVE
PLATELET # BLD AUTO: 233 10*3/MM3 (ref 140–450)
PMV BLD AUTO: 9.2 FL (ref 6–12)
RBC # BLD AUTO: 3.22 10*6/MM3 (ref 3.77–5.28)
T PALLIDUM IGG SER QL: NORMAL
WBC NRBC COR # BLD AUTO: 9.49 10*3/MM3 (ref 3.4–10.8)

## 2024-03-15 PROCEDURE — 86850 RBC ANTIBODY SCREEN: CPT | Performed by: OBSTETRICS & GYNECOLOGY

## 2024-03-15 PROCEDURE — 80307 DRUG TEST PRSMV CHEM ANLYZR: CPT | Performed by: OBSTETRICS & GYNECOLOGY

## 2024-03-15 PROCEDURE — 85027 COMPLETE CBC AUTOMATED: CPT | Performed by: OBSTETRICS & GYNECOLOGY

## 2024-03-15 PROCEDURE — 59025 FETAL NON-STRESS TEST: CPT

## 2024-03-15 PROCEDURE — 84112 EVAL AMNIOTIC FLUID PROTEIN: CPT | Performed by: OBSTETRICS & GYNECOLOGY

## 2024-03-15 PROCEDURE — 86780 TREPONEMA PALLIDUM: CPT | Performed by: OBSTETRICS & GYNECOLOGY

## 2024-03-15 PROCEDURE — 25810000003 LACTATED RINGERS PER 1000 ML: Performed by: OBSTETRICS & GYNECOLOGY

## 2024-03-15 PROCEDURE — 86900 BLOOD TYPING SEROLOGIC ABO: CPT | Performed by: OBSTETRICS & GYNECOLOGY

## 2024-03-15 PROCEDURE — 25810000003 SODIUM CHLORIDE 0.9 % SOLUTION: Performed by: STUDENT IN AN ORGANIZED HEALTH CARE EDUCATION/TRAINING PROGRAM

## 2024-03-15 PROCEDURE — 86901 BLOOD TYPING SEROLOGIC RH(D): CPT | Performed by: OBSTETRICS & GYNECOLOGY

## 2024-03-15 PROCEDURE — 51703 INSERT BLADDER CATH COMPLEX: CPT

## 2024-03-15 PROCEDURE — 25010000002 OXYTOCIN PER 10 UNITS: Performed by: STUDENT IN AN ORGANIZED HEALTH CARE EDUCATION/TRAINING PROGRAM

## 2024-03-15 RX ORDER — SODIUM CHLORIDE 0.9 % (FLUSH) 0.9 %
10 SYRINGE (ML) INJECTION AS NEEDED
Status: DISCONTINUED | OUTPATIENT
Start: 2024-03-15 | End: 2024-03-16

## 2024-03-15 RX ORDER — MORPHINE SULFATE 2 MG/ML
1 INJECTION, SOLUTION INTRAMUSCULAR; INTRAVENOUS EVERY 4 HOURS PRN
Status: DISCONTINUED | OUTPATIENT
Start: 2024-03-15 | End: 2024-03-16

## 2024-03-15 RX ORDER — SODIUM CHLORIDE, SODIUM LACTATE, POTASSIUM CHLORIDE, CALCIUM CHLORIDE 600; 310; 30; 20 MG/100ML; MG/100ML; MG/100ML; MG/100ML
125 INJECTION, SOLUTION INTRAVENOUS CONTINUOUS
Status: DISCONTINUED | OUTPATIENT
Start: 2024-03-15 | End: 2024-03-16

## 2024-03-15 RX ORDER — ACETAMINOPHEN 325 MG/1
650 TABLET ORAL EVERY 4 HOURS PRN
Status: DISCONTINUED | OUTPATIENT
Start: 2024-03-15 | End: 2024-03-16

## 2024-03-15 RX ORDER — SODIUM CHLORIDE 9 MG/ML
40 INJECTION, SOLUTION INTRAVENOUS AS NEEDED
Status: DISCONTINUED | OUTPATIENT
Start: 2024-03-15 | End: 2024-03-16

## 2024-03-15 RX ORDER — TERBUTALINE SULFATE 1 MG/ML
0.25 INJECTION, SOLUTION SUBCUTANEOUS AS NEEDED
Status: DISCONTINUED | OUTPATIENT
Start: 2024-03-15 | End: 2024-03-16

## 2024-03-15 RX ORDER — ONDANSETRON 4 MG/1
4 TABLET, ORALLY DISINTEGRATING ORAL EVERY 6 HOURS PRN
Status: DISCONTINUED | OUTPATIENT
Start: 2024-03-15 | End: 2024-03-16

## 2024-03-15 RX ORDER — NALOXONE HCL 0.4 MG/ML
0.4 VIAL (ML) INJECTION
Status: DISCONTINUED | OUTPATIENT
Start: 2024-03-15 | End: 2024-03-16

## 2024-03-15 RX ORDER — SODIUM CHLORIDE 0.9 % (FLUSH) 0.9 %
10 SYRINGE (ML) INJECTION EVERY 12 HOURS SCHEDULED
Status: DISCONTINUED | OUTPATIENT
Start: 2024-03-15 | End: 2024-03-16

## 2024-03-15 RX ORDER — LIDOCAINE HYDROCHLORIDE 10 MG/ML
0.5 INJECTION, SOLUTION INFILTRATION; PERINEURAL ONCE AS NEEDED
Status: DISCONTINUED | OUTPATIENT
Start: 2024-03-15 | End: 2024-03-16

## 2024-03-15 RX ORDER — OXYTOCIN/0.9 % SODIUM CHLORIDE 30/500 ML
2 PLASTIC BAG, INJECTION (ML) INTRAVENOUS
Status: DISCONTINUED | OUTPATIENT
Start: 2024-03-15 | End: 2024-03-16

## 2024-03-15 RX ORDER — MAGNESIUM CARB/ALUMINUM HYDROX 105-160MG
30 TABLET,CHEWABLE ORAL ONCE
Status: DISCONTINUED | OUTPATIENT
Start: 2024-03-15 | End: 2024-03-16

## 2024-03-15 RX ORDER — ONDANSETRON 2 MG/ML
4 INJECTION INTRAMUSCULAR; INTRAVENOUS EVERY 6 HOURS PRN
Status: DISCONTINUED | OUTPATIENT
Start: 2024-03-15 | End: 2024-03-16

## 2024-03-15 RX ADMIN — OXYTOCIN 1 MILLI-UNITS/MIN: 10 INJECTION, SOLUTION INTRAMUSCULAR; INTRAVENOUS at 23:59

## 2024-03-15 RX ADMIN — SODIUM CHLORIDE, POTASSIUM CHLORIDE, SODIUM LACTATE AND CALCIUM CHLORIDE 125 ML/HR: 600; 310; 30; 20 INJECTION, SOLUTION INTRAVENOUS at 17:55

## 2024-03-15 NOTE — NURSING NOTE
Informed Dr. Steiner of pt's arrival to ob triage with c/o leaking fluid. Patient left here after scheduled NST for chronic hypertension when her water broke while running errands at 1550.  Pt is a  at 36w6d. Amnisure sent and is positive. Fluid is clear, GBS is negative. He states he will come to bedside to see patient.

## 2024-03-15 NOTE — H&P
EL Smallwood  Obstetric History and Physical  [facility] [place]    Chief Complaint   Patient presents with    Rupture of Membranes       Subjective     Patient is a 29 y.o. female  currently at 36w6d, who presents with complaint of loss of fluid.  Gross rupture of membranes was confirmed.  She denies any vaginal bleeding.  Positive fetal movements.    Her prenatal care is complicated by chronic hypertension.  Her previous obstetric/gynecological history is noted for is non-contributory.    The following portions of the patients history were reviewed and updated as appropriate: current medications, allergies, past medical history, past surgical history, past family history, past social history, and problem list .       Prenatal Information:  Prenatal Results       Initial Prenatal Labs       Test Value Reference Range Date Time    Hemoglobin  12.6 g/dL 12.0 - 15.9 23 1513    Hematocrit  37.0 % 34.0 - 46.6 23 1513    Platelets  270 10*3/mm3 140 - 450 23 1513    Rubella IgG  <0.90 index Immune >0.99 23 1513    Hepatitis B SAg  Non-Reactive  Non-Reactive 23 1513    Hepatitis C Ab  Non-Reactive  Non-Reactive 23 1513    RPR        T. Pallidum Ab   Non-Reactive  Non-Reactive 23 1513    ABO  O   23 1513    Rh  Positive   23 1513    Antibody Screen  Negative   23 1513    HIV  Non-Reactive  Non-Reactive 23 1513    Urine Culture  >100,000 CFU/mL Mixed Gram Positive Mariela   23 1309       >100,000 CFU/mL Mixed Gram Positive Mariela   23 1504    Gonorrhea  Negative  Negative 23 1504    Chlamydia  Negative  Negative 23 1504    TSH  1.450 uIU/mL 0.270 - 4.200 22 0801    HgB A1c         Varicella IgG        HgB Electrophoresis         Cystic fibrosis                   Fetal testing        Test Value Reference Range Date Time    NIPT        MSAFP        AFP-4                  2nd and 3rd Trimester       Test Value Reference Range Date Time     Hemoglobin (repeated)  10.0 g/dL 12.0 - 15.9 02/27/24 0952       9.4 g/dL 12.0 - 15.9 01/12/24 2331       9.6 g/dL 12.0 - 15.9 01/04/24 1514    Hematocrit (repeated)  29.6 % 34.0 - 46.6 02/27/24 0952       27.7 % 34.0 - 46.6 01/12/24 2331       29.4 % 34.0 - 46.6 01/04/24 1514    Platelets   235 10*3/mm3 140 - 450 02/27/24 0952       250 10*3/mm3 140 - 450 01/12/24 2331       243 10*3/mm3 140 - 450 01/04/24 1514       270 10*3/mm3 140 - 450 09/19/23 1513    GCT  114 mg/dL 65 - 139 01/04/24 1514    Antibody Screen (repeated)        Third Trimester syphilis screen (repeated)         GTT Fasting        GTT 1 Hr        GTT 2 Hr        GTT 3 Hr        Group B Strep  No Group B Streptococcus Isolated at 2 days   03/06/24 0953              Other testing        Test Value Reference Range Date Time    Parvo IgG         CMV IgG                   Drug Screening       Test Value Reference Range Date Time    Amphetamine Screen        Barbiturate Screen        Benzodiazepine Screen        Methadone Screen        Phencyclidine Screen        Opiates Screen        THC Screen        Cocaine Screen        Propoxyphene Screen        Buprenorphine Screen        Methamphetamine Screen        Oxycodone Screen        Tricyclic Antidepressants Screen                  Legend    ^: Historical                          External Prenatal Results       Pregnancy Outside Results - Transcribed From Office Records - See Scanned Records For Details       Test Value Date Time    ABO  O  09/19/23 1513    Rh  Positive  09/19/23 1513    Antibody Screen  Negative  09/19/23 1513    Varicella IgG  602 index 06/01/20 0942    Rubella  <0.90 index 09/19/23 1513    Hgb  10.0 g/dL 02/27/24 0952       9.4 g/dL 01/12/24 2331       9.6 g/dL 01/04/24 1514       12.6 g/dL 09/19/23 1513    Hct  29.6 % 02/27/24 0952       27.7 % 01/12/24 2331       29.4 % 01/04/24 1514       37.0 % 09/19/23 1513    Glucose Fasting GTT       Glucose Tolerance Test 1 hour       Glucose  Tolerance Test 3 hour       Gonorrhea (discrete)  Negative  23 1504    Chlamydia (discrete)  Negative  23 1504    RPR       VDRL       Syphilis Antibody       HBsAg  Non-Reactive  23 1513    Herpes Simplex Virus PCR       Herpes Simplex VIrus Culture       HIV  Non-Reactive  23 1513    Hep C RNA Quant PCR       Hep C Antibody  Non-Reactive  23 1513    AFP       Group B Strep  No Group B Streptococcus Isolated at 2 days  24 0953    GBS Susceptibility to Clindamycin       GBS Susceptibility to Erythromycin       Fetal Fibronectin       Genetic Testing, Maternal Blood                 Drug Screening       Test Value Date Time    Urine Drug Screen       Amphetamine Screen       Barbiturate Screen       Benzodiazepine Screen       Methadone Screen       Phencyclidine Screen       Opiates Screen       THC Screen       Cocaine Screen       Propoxyphene Screen       Buprenorphine Screen       Methamphetamine Screen       Oxycodone Screen       Tricyclic Antidepressants Screen                 Legend    ^: Historical                             Past OB History:     OB History    Para Term  AB Living   3 0 0 0 2 0   SAB IAB Ectopic Molar Multiple Live Births   2 0 0 0 0 0      # Outcome Date GA Lbr Adair/2nd Weight Sex Type Anes PTL Lv   3 Current            2 SAB      SAB      1 SAB      SAB          Past Medical History: Past Medical History:   Diagnosis Date    Anxiety     Bipolar disorder     Fracture of foot     left foot    Hypertension     Kidney stone     Recurrent pregnancy loss, antepartum condition or complication       Past Surgical History Past Surgical History:   Procedure Laterality Date    CHOLECYSTECTOMY      URETEROSCOPY LASER LITHOTRIPSY WITH STENT INSERTION Right 2022    Procedure: URETEROSCOPY STONE BASKETING WITH URETERAL STENT INSERTION;  Surgeon: Shanta Jay MD;  Location: [facility] OR;  Service: Urology;  Laterality: Right;      Family  History: Family History   Problem Relation Age of Onset    Heart disease Paternal Grandmother     Stroke Paternal Grandmother     Skin cancer Paternal Grandmother     Diabetes Maternal Grandmother     Stroke Maternal Grandfather     Breast cancer Paternal Aunt 38    Malig Hyperthermia Neg Hx     Ovarian cancer Neg Hx     Uterine cancer Neg Hx     Cervical cancer Neg Hx     Colon cancer Neg Hx     Stomach cancer Neg Hx     Clotting disorder Neg Hx     Malig Hypertension Neg Hx       Social History:  reports that she has never smoked. She has never used smokeless tobacco.   reports that she does not currently use alcohol after a past usage of about 2.0 standard drinks of alcohol per week.   reports no history of drug use.        General ROS: Pertinent items are noted in HPI    Objective       Vital Signs Range for the last 24 hours  Temperature: Temp:  [98.5 °F (36.9 °C)] 98.5 °F (36.9 °C)   Temp Source: Temp src: Oral   BP: BP: (137-142)/(87-93) 137/93   Pulse: Heart Rate:  [102-105] 102   Respirations: Resp:  [18] 18   SPO2: SpO2:  [100 %] 100 %   O2 Amount (l/min):     O2 Devices     Weight:       Physical Examination: General appearance - alert, well appearing, and in no distress  Mental status - alert, oriented to person, place, and time  Chest - clear to auscultation, no wheezes, rales or rhonchi, symmetric air entry  Heart - normal rate, regular rhythm, normal S1, S2, no murmurs, rubs, clicks or gallops  Abdomen - soft, nontender, nondistended, gravid  Extremities - peripheral pulses normal, trace pitting edema  Skin - normal coloration and turgor, no rashes, no suspicious skin lesions noted    Presentation: Vertex   Cervix: Exam by: Method: sterile exam per RN   Dilation: Cervical Dilation (cm): 2   Effacement: Cervical Effacement: 70%   Station:         Fetal Heart Rate Assessment   Method:     Beats/min:     Baseline:     Variability:     Accels:     Decels:           Uterine Assessment   Method:      Frequency (min):     Ctx Count in 10 min:     Duration:     Intensity:         Valier Units:       GBS is negative      Assessment & Plan        premature rupture of membranes        Assessment:  1.  Intrauterine pregnancy at 36w6d gestation with reactive fetal status.    2.  labor  with ROM  3.  Obstetrical history significant for is non-contributory.  4.  GBS status:   Group B Strep Culture   Date Value Ref Range Status   2024 No Group B Streptococcus Isolated at 2 days  Final       Plan:  1.  Vaginal delivery  2. Plan of care has been reviewed with patient and patient agrees.   3.  Risks, benefits of treatment plan have been discussed.  4.  All questions have been answered.  5.  Pitocin for augmentation as needed      William Steiner MD  3/15/2024  16:53 EDT

## 2024-03-15 NOTE — NURSING NOTE
Educated patient on breast pumping to increase contractions. Patient would like to attempt being up at the bedside on birthing ball prior to starting any interventions

## 2024-03-15 NOTE — PLAN OF CARE
Problem: Adult Inpatient Plan of Care  Goal: Plan of Care Review  Outcome: Ongoing, Progressing  Goal: Patient-Specific Goal (Individualized)  Outcome: Ongoing, Progressing  Goal: Absence of Hospital-Acquired Illness or Injury  Outcome: Ongoing, Progressing  Intervention: Prevent and Manage VTE (Venous Thromboembolism) Risk  Recent Flowsheet Documentation  Taken 3/15/2024 1730 by Wendy Bowen, RN  VTE Prevention/Management:   bilateral   patient refused intervention   sequential compression devices off  Goal: Optimal Comfort and Wellbeing  Outcome: Ongoing, Progressing  Goal: Readiness for Transition of Care  Outcome: Ongoing, Progressing  Intervention: Mutually Develop Transition Plan  Recent Flowsheet Documentation  Taken 3/15/2024 1650 by Wendy Bowen, RN  Equipment Currently Used at Home: none  Taken 3/15/2024 1649 by Wendy Bowen, RN  Transportation Anticipated:   car, drives self   family or friend will provide  Patient/Family Anticipated Services at Transition: none  Patient/Family Anticipates Transition to: home     Problem: Bleeding (Labor)  Goal: Hemostasis  Outcome: Ongoing, Progressing     Problem: Change in Fetal Wellbeing (Labor)  Goal: Stable Fetal Wellbeing  Outcome: Ongoing, Progressing     Problem: Delayed Labor Progression (Labor)  Goal: Effective Progression to Delivery  Outcome: Ongoing, Progressing     Problem: Infection (Labor)  Goal: Absence of Infection Signs and Symptoms  Outcome: Ongoing, Progressing     Problem: Labor Pain (Labor)  Goal: Acceptable Pain Control  Outcome: Ongoing, Progressing     Problem: Uterine Tachysystole (Labor)  Goal: Normal Uterine Contraction Pattern  Outcome: Ongoing, Progressing   Goal Outcome Evaluation:

## 2024-03-15 NOTE — NON STRESS TEST
"Obstetrical Non-stress Test Interpretation     Name:  Sherri SEBASTIAN  MRN: [id number]    29 y.o. female  at 36w6d    Indication: Chronic hypertension      Fetal Assessment  Fetal Movement: active  Fetal HR Assessment Method: external  Fetal HR (beats/min): 125  Fetal HR Baseline: normal range  Fetal HR Variability: moderate (amplitude range 6 to 25 bpm)  Fetal HR Accelerations: greater than/equal to 15 bpm, lasting at least 15 seconds  Fetal HR Decelerations: absent  Sinusoidal Pattern Present: absent    /93   Pulse 102   Temp 98.5 °F (36.9 °C) (Oral)   Resp 18   Ht 167.6 cm (66\")   LMP 2023 (Approximate) Comment: Started bleeding with miscarriage this day  SpO2 100%   BMI 40.35 kg/m²   Patient Vitals for the past 24 hrs:   BP Temp Temp src Pulse Resp SpO2 Height   03/15/24 1521 137/93 -- -- 102 -- -- --   03/15/24 1511 142/87 -- -- 102 -- -- --   03/15/24 1501 -- 98.5 °F (36.9 °C) Oral 105 18 100 % 167.6 cm (66\")        Reason for test: Hypertension (chronic)  Date of Test: 3/15/2024  Time frame of test: 3007-4961  RN NST Interpretation: Reactive      Wendy Rowland RN  3/15/2024  15:27 EDT  "

## 2024-03-15 NOTE — NURSING NOTE
"Verbal orders to start pitocin given by Dr. Howell. Attempted to start IV pitocin. Patient states she \"wasn't wanting to use pitocin without trying other ways to facilitate labor.\" Educated patient on use of pitocin and process. Patient states she would like some time to think about it. Updated Dr. Howell. Dr. Howell suggested to start using breast pump to increase contractions.  "

## 2024-03-16 ENCOUNTER — ANESTHESIA (OUTPATIENT)
Dept: LABOR AND DELIVERY | Facility: HOSPITAL | Age: 29
End: 2024-03-16
Payer: COMMERCIAL

## 2024-03-16 ENCOUNTER — ANESTHESIA EVENT (OUTPATIENT)
Dept: LABOR AND DELIVERY | Facility: HOSPITAL | Age: 29
End: 2024-03-16
Payer: COMMERCIAL

## 2024-03-16 PROBLEM — E55.9 VITAMIN D DEFICIENCY: Status: RESOLVED | Noted: 2022-09-19 | Resolved: 2024-03-16

## 2024-03-16 PROBLEM — Z34.80 SUPERVISION OF OTHER NORMAL PREGNANCY, ANTEPARTUM: Status: RESOLVED | Noted: 2023-09-18 | Resolved: 2024-03-16

## 2024-03-16 LAB
BASE EXCESS BLDCOA CALC-SCNC: -5.4 MMOL/L (ref -2–2)
BASE EXCESS BLDCOV CALC-SCNC: -3.1 MMOL/L (ref -2–2)
HCO3 BLDCOA-SCNC: 20.3 MMOL/L
HCO3 BLDCOV-SCNC: 22.3 MMOL/L
PCO2 BLDCOA: 40.5 MMHG (ref 33–49)
PCO2 BLDCOV: 41.3 MM HG (ref 28–40)
PH BLDCOA: 7.32 PH UNITS (ref 7.21–7.31)
PH BLDCOV: 7.35 PH UNITS (ref 7.31–7.37)
PO2 BLDCOA: <40.5 MMHG
PO2 BLDCOV: <40.5 MM HG (ref 21–31)

## 2024-03-16 PROCEDURE — 25010000002 ROPIVACAINE PER 1 MG: Performed by: NURSE ANESTHETIST, CERTIFIED REGISTERED

## 2024-03-16 PROCEDURE — 25810000003 LACTATED RINGERS PER 1000 ML: Performed by: OBSTETRICS & GYNECOLOGY

## 2024-03-16 PROCEDURE — 25010000002 OXYTOCIN PER 10 UNITS: Performed by: STUDENT IN AN ORGANIZED HEALTH CARE EDUCATION/TRAINING PROGRAM

## 2024-03-16 PROCEDURE — 25810000003 SODIUM CHLORIDE 0.9 % SOLUTION: Performed by: STUDENT IN AN ORGANIZED HEALTH CARE EDUCATION/TRAINING PROGRAM

## 2024-03-16 PROCEDURE — 59400 OBSTETRICAL CARE: CPT | Performed by: OBSTETRICS & GYNECOLOGY

## 2024-03-16 PROCEDURE — 25010000002 OXYTOCIN PER 10 UNITS: Performed by: OBSTETRICS & GYNECOLOGY

## 2024-03-16 PROCEDURE — 82803 BLOOD GASES ANY COMBINATION: CPT | Performed by: OBSTETRICS & GYNECOLOGY

## 2024-03-16 PROCEDURE — 0KQM0ZZ REPAIR PERINEUM MUSCLE, OPEN APPROACH: ICD-10-PCS | Performed by: OBSTETRICS & GYNECOLOGY

## 2024-03-16 PROCEDURE — C1755 CATHETER, INTRASPINAL: HCPCS | Performed by: NURSE ANESTHETIST, CERTIFIED REGISTERED

## 2024-03-16 PROCEDURE — 25810000003 SODIUM CHLORIDE 0.9 % SOLUTION: Performed by: OBSTETRICS & GYNECOLOGY

## 2024-03-16 PROCEDURE — 51702 INSERT TEMP BLADDER CATH: CPT

## 2024-03-16 RX ORDER — BISACODYL 10 MG
10 SUPPOSITORY, RECTAL RECTAL DAILY PRN
Status: DISCONTINUED | OUTPATIENT
Start: 2024-03-17 | End: 2024-03-18 | Stop reason: HOSPADM

## 2024-03-16 RX ORDER — IBUPROFEN 600 MG/1
600 TABLET ORAL EVERY 6 HOURS
Status: DISCONTINUED | OUTPATIENT
Start: 2024-03-16 | End: 2024-03-16 | Stop reason: HOSPADM

## 2024-03-16 RX ORDER — HYDROCODONE BITARTRATE AND ACETAMINOPHEN 10; 325 MG/1; MG/1
1 TABLET ORAL EVERY 4 HOURS PRN
Status: DISCONTINUED | OUTPATIENT
Start: 2024-03-16 | End: 2024-03-18 | Stop reason: HOSPADM

## 2024-03-16 RX ORDER — EPHEDRINE SULFATE 50 MG/ML
5 INJECTION, SOLUTION INTRAVENOUS
Status: DISCONTINUED | OUTPATIENT
Start: 2024-03-16 | End: 2024-03-16

## 2024-03-16 RX ORDER — ONDANSETRON 4 MG/1
4 TABLET, ORALLY DISINTEGRATING ORAL EVERY 6 HOURS PRN
Status: DISCONTINUED | OUTPATIENT
Start: 2024-03-16 | End: 2024-03-16 | Stop reason: HOSPADM

## 2024-03-16 RX ORDER — ROPIVACAINE HYDROCHLORIDE 2 MG/ML
INJECTION, SOLUTION EPIDURAL; INFILTRATION; PERINEURAL
Status: COMPLETED | OUTPATIENT
Start: 2024-03-16 | End: 2024-03-16

## 2024-03-16 RX ORDER — ONDANSETRON 2 MG/ML
4 INJECTION INTRAMUSCULAR; INTRAVENOUS EVERY 6 HOURS PRN
Status: DISCONTINUED | OUTPATIENT
Start: 2024-03-16 | End: 2024-03-16 | Stop reason: HOSPADM

## 2024-03-16 RX ORDER — MISOPROSTOL 200 UG/1
600 TABLET ORAL ONCE
Status: COMPLETED | OUTPATIENT
Start: 2024-03-16 | End: 2024-03-16

## 2024-03-16 RX ORDER — OXYTOCIN/0.9 % SODIUM CHLORIDE 30/500 ML
250 PLASTIC BAG, INJECTION (ML) INTRAVENOUS CONTINUOUS
Status: ACTIVE | OUTPATIENT
Start: 2024-03-16 | End: 2024-03-16

## 2024-03-16 RX ORDER — HYDROCODONE BITARTRATE AND ACETAMINOPHEN 5; 325 MG/1; MG/1
1 TABLET ORAL EVERY 4 HOURS PRN
Status: DISCONTINUED | OUTPATIENT
Start: 2024-03-16 | End: 2024-03-18 | Stop reason: HOSPADM

## 2024-03-16 RX ORDER — FAMOTIDINE 20 MG/1
20 TABLET, FILM COATED ORAL ONCE AS NEEDED
Status: COMPLETED | OUTPATIENT
Start: 2024-03-16 | End: 2024-03-16

## 2024-03-16 RX ORDER — DIPHENOXYLATE HYDROCHLORIDE AND ATROPINE SULFATE 2.5; .025 MG/1; MG/1
2 TABLET ORAL 4 TIMES DAILY PRN
Status: DISCONTINUED | OUTPATIENT
Start: 2024-03-16 | End: 2024-03-18 | Stop reason: HOSPADM

## 2024-03-16 RX ORDER — SODIUM CHLORIDE 0.9 % (FLUSH) 0.9 %
1-10 SYRINGE (ML) INJECTION AS NEEDED
Status: DISCONTINUED | OUTPATIENT
Start: 2024-03-16 | End: 2024-03-18 | Stop reason: HOSPADM

## 2024-03-16 RX ORDER — FAMOTIDINE 10 MG/ML
20 INJECTION, SOLUTION INTRAVENOUS ONCE AS NEEDED
Status: COMPLETED | OUTPATIENT
Start: 2024-03-16 | End: 2024-03-16

## 2024-03-16 RX ORDER — LIDOCAINE HCL/EPINEPHRINE/PF 2%-1:200K
VIAL (ML) INJECTION
Status: DISPENSED
Start: 2024-03-16 | End: 2024-03-17

## 2024-03-16 RX ORDER — IBUPROFEN 600 MG/1
600 TABLET ORAL EVERY 6 HOURS PRN
Status: DISCONTINUED | OUTPATIENT
Start: 2024-03-16 | End: 2024-03-18 | Stop reason: HOSPADM

## 2024-03-16 RX ORDER — OXYTOCIN/0.9 % SODIUM CHLORIDE 30/500 ML
999 PLASTIC BAG, INJECTION (ML) INTRAVENOUS ONCE
Status: COMPLETED | OUTPATIENT
Start: 2024-03-16 | End: 2024-03-16

## 2024-03-16 RX ORDER — ONDANSETRON 4 MG/1
4 TABLET, ORALLY DISINTEGRATING ORAL EVERY 6 HOURS PRN
Status: DISCONTINUED | OUTPATIENT
Start: 2024-03-16 | End: 2024-03-18 | Stop reason: HOSPADM

## 2024-03-16 RX ORDER — ACETAMINOPHEN 325 MG/1
650 TABLET ORAL EVERY 6 HOURS PRN
Status: DISCONTINUED | OUTPATIENT
Start: 2024-03-16 | End: 2024-03-18 | Stop reason: HOSPADM

## 2024-03-16 RX ORDER — PRENATAL VIT/IRON FUM/FOLIC AC 27MG-0.8MG
1 TABLET ORAL DAILY
Status: DISCONTINUED | OUTPATIENT
Start: 2024-03-16 | End: 2024-03-18 | Stop reason: HOSPADM

## 2024-03-16 RX ORDER — ACETAMINOPHEN 325 MG/1
650 TABLET ORAL EVERY 6 HOURS
Status: DISCONTINUED | OUTPATIENT
Start: 2024-03-16 | End: 2024-03-16 | Stop reason: HOSPADM

## 2024-03-16 RX ORDER — LIDOCAINE HYDROCHLORIDE 10 MG/ML
INJECTION, SOLUTION EPIDURAL; INFILTRATION; INTRACAUDAL; PERINEURAL
Status: DISPENSED
Start: 2024-03-16 | End: 2024-03-16

## 2024-03-16 RX ORDER — CALCIUM CARBONATE 500 MG/1
2 TABLET, CHEWABLE ORAL 3 TIMES DAILY PRN
Status: DISCONTINUED | OUTPATIENT
Start: 2024-03-16 | End: 2024-03-18 | Stop reason: HOSPADM

## 2024-03-16 RX ORDER — LIDOCAINE HYDROCHLORIDE AND EPINEPHRINE 15; 5 MG/ML; UG/ML
INJECTION, SOLUTION EPIDURAL
Status: COMPLETED | OUTPATIENT
Start: 2024-03-16 | End: 2024-03-16

## 2024-03-16 RX ORDER — CARBOPROST TROMETHAMINE 250 UG/ML
250 INJECTION, SOLUTION INTRAMUSCULAR ONCE
Status: COMPLETED | OUTPATIENT
Start: 2024-03-16 | End: 2024-03-16

## 2024-03-16 RX ORDER — DOCUSATE SODIUM 100 MG/1
100 CAPSULE, LIQUID FILLED ORAL 2 TIMES DAILY
Status: DISCONTINUED | OUTPATIENT
Start: 2024-03-16 | End: 2024-03-18 | Stop reason: HOSPADM

## 2024-03-16 RX ORDER — OXYTOCIN/0.9 % SODIUM CHLORIDE 30/500 ML
125 PLASTIC BAG, INJECTION (ML) INTRAVENOUS ONCE AS NEEDED
Status: DISCONTINUED | OUTPATIENT
Start: 2024-03-16 | End: 2024-03-18 | Stop reason: HOSPADM

## 2024-03-16 RX ORDER — MISOPROSTOL 200 UG/1
200 TABLET ORAL
Status: DISPENSED | OUTPATIENT
Start: 2024-03-16 | End: 2024-03-16

## 2024-03-16 RX ADMIN — Medication: at 16:25

## 2024-03-16 RX ADMIN — IBUPROFEN 600 MG: 600 TABLET, FILM COATED ORAL at 14:33

## 2024-03-16 RX ADMIN — OXYTOCIN 999 ML/HR: 10 INJECTION, SOLUTION INTRAMUSCULAR; INTRAVENOUS at 12:07

## 2024-03-16 RX ADMIN — Medication 10 ML/HR: at 02:58

## 2024-03-16 RX ADMIN — ROPIVACAINE HYDROCHLORIDE 8 ML: 2 INJECTION, SOLUTION EPIDURAL; INFILTRATION; PERINEURAL at 02:56

## 2024-03-16 RX ADMIN — SODIUM CHLORIDE, POTASSIUM CHLORIDE, SODIUM LACTATE AND CALCIUM CHLORIDE 125 ML/HR: 600; 310; 30; 20 INJECTION, SOLUTION INTRAVENOUS at 00:42

## 2024-03-16 RX ADMIN — SODIUM CHLORIDE, POTASSIUM CHLORIDE, SODIUM LACTATE AND CALCIUM CHLORIDE 125 ML/HR: 600; 310; 30; 20 INJECTION, SOLUTION INTRAVENOUS at 06:30

## 2024-03-16 RX ADMIN — FAMOTIDINE 20 MG: 10 INJECTION INTRAVENOUS at 08:01

## 2024-03-16 RX ADMIN — DOCUSATE SODIUM 100 MG: 100 CAPSULE, LIQUID FILLED ORAL at 21:10

## 2024-03-16 RX ADMIN — VITAMIN A, VITAMIN C, VITAMIN D, VITAMIN E, THIAMINE, RIBOFLAVIN, NIACIN, VITAMIN B6, FOLIC ACID, VITAMIN B12, CALCIUM, IRON, ZINC, COPPER 1 TABLET: 4000; 120; 400; 22; 1.84; 3; 20; 10; 1; 12; 200; 27; 25; 2 TABLET ORAL at 14:33

## 2024-03-16 RX ADMIN — WITCH HAZEL: 500 SOLUTION RECTAL; TOPICAL at 16:25

## 2024-03-16 RX ADMIN — ACETAMINOPHEN 650 MG: 325 TABLET ORAL at 07:23

## 2024-03-16 RX ADMIN — OXYTOCIN 2 MILLI-UNITS/MIN: 10 INJECTION, SOLUTION INTRAMUSCULAR; INTRAVENOUS at 08:48

## 2024-03-16 RX ADMIN — Medication 10 ML/HR: at 12:00

## 2024-03-16 RX ADMIN — LIDOCAINE HYDROCHLORIDE AND EPINEPHRINE 4 ML: 15; 5 INJECTION, SOLUTION EPIDURAL at 02:53

## 2024-03-16 RX ADMIN — MISOPROSTOL 200 MCG: 200 TABLET ORAL at 16:25

## 2024-03-16 RX ADMIN — MISOPROSTOL 200 MCG: 200 TABLET ORAL at 12:16

## 2024-03-16 RX ADMIN — CARBOPROST TROMETHAMINE 250 MCG: 250 INJECTION, SOLUTION INTRAMUSCULAR at 12:10

## 2024-03-16 RX ADMIN — IBUPROFEN 600 MG: 600 TABLET, FILM COATED ORAL at 21:09

## 2024-03-16 RX ADMIN — DIPHENOXYLATE HYDROCHLORIDE AND ATROPINE SULFATE 2 TABLET: 2.5; .025 TABLET ORAL at 13:25

## 2024-03-16 NOTE — ANESTHESIA PREPROCEDURE EVALUATION
Anesthesia Evaluation     no history of anesthetic complications:   NPO Solid Status: > 2 hours  NPO Liquid Status: > 2 hours           Airway   Mallampati: III  TM distance: >3 FB  Neck ROM: full  No difficulty expected  Dental - normal exam     Pulmonary - negative pulmonary ROS and normal exam   Cardiovascular - normal exam  Exercise tolerance: good (4-7 METS)    (+) hypertension      Neuro/Psych- negative ROS  GI/Hepatic/Renal/Endo    (+) obesity    Musculoskeletal     Abdominal    Substance History      OB/GYN    (+) Pregnant, pregnancy induced hypertension        Other                    Anesthesia Plan    ASA 2     epidural       Anesthetic plan, risks, benefits, and alternatives have been provided, discussed and informed consent has been obtained with: patient.  Pre-procedure education provided  Plan discussed with CRNA.    CODE STATUS:    Level Of Support Discussed With: Patient  Code Status (Patient has no pulse and is not breathing): CPR (Attempt to Resuscitate)  Medical Interventions (Patient has pulse or is breathing): Full Support

## 2024-03-16 NOTE — L&D DELIVERY NOTE
VAGINAL DELIVERY NOTE          Delivery Date: 3/16/2024   Delivery Time: 11:45 AM   Delivery Type: Spontaneous vaginal delivery  Gestational Age: 37w0d  Delivery Provider: Andres Pruitt MD    Anesthesia: Epidural    Infant: male  infant   2740 g (6 lb 0.7 oz)   APGARS: 8  @ 1 minute / 9  @ 5 minutes  Shoulder Dystocia: No      Placenta, Cord, and Fluid    Placenta Delivered:  Spontaneous  at   3/16/2024 12:07 PM     Cord: 3 vessels  present.   Nuchal Cord:  no   Cord Blood Obtained: Yes    Cord Gases Obtained:  Yes    Cord Gas Results: Venous:    pH, Cord Venous   Date Value Ref Range Status   03/16/2024 7.351 7.310 - 7.370 pH Units Final     Base Excess, Cord Venous   Date Value Ref Range Status   03/16/2024 -3.1 (L) -2.0 - 2.0 mmol/L Final       Arterial:    pH, Cord Arterial   Date Value Ref Range Status   03/16/2024 7.32 (H) 7.21 - 7.31 pH Units Final     Base Exc, Cord Arterial   Date Value Ref Range Status   03/16/2024 -5.4 (L) -2.0 - 2.0 mmol/L Final          Perineum: OBPERINEUM: 2nd degree laceration    EBL: 400 mL    Complications: None    Description of Procedure: I was called to the bedside after the patient was found to be complete, complete, +3 station and pushing well. With the next pushes the patient progressed to a +5 station, and delivered a live viable male infant at 7:45 AM over a second-degree midline laceration, under epidural anesthesia.  The patient pushed for approximately 2 hours 15 minutes total. The baby delivered in the left occiput anterior presentation.  After the delivery of the head, the anterior shoulder was delivered first and without difficulty.  This was immediately followed by the remainder the infant without difficulty.  At no time was there a shoulder dystocia, and at no time was any fundal pressure given. After complete delivery, the mouth and nose were bulb suctioned, the cord was doubly clamped and the baby was placed on the mother's abdomen, with a good cry. The  father the baby cut the umbilical cord. Apgars were 8 and 9 at 1 and 5 minutes. The birth weight was 6 pounds 1 ounce. Cord blood and gases were collected. The placenta was delivered spontaneously and intact at 12:07 PM. There was a 3 vessel umbilical cord present. The vagina and cervix were inspected and there was a second-degree midline laceration.  Initially uterine tone was poor and uterine bleeding was brisk.  Uterine massage was provided and Pitocin IV was started.  This greatly improved uterine tone but there was still some increased bleeding.  Hemabate 0.25 mg IM was provided.  At this point uterine tone was satisfactory and lochia was scant.  The second-degree midline laceration was repaired with 3-0 Vicryl suture in 2 layers, in a continuous fashion. Uterine tone was good, and lochia was scant. EBL was 400 mL. Both mother and  are recovering in excellent condition in the labor room. All counts were correct x 2 prior to my exit of the room    Electronically signed by Andres Pruitt MD, 24, 12:21 PM EDT.

## 2024-03-16 NOTE — PROGRESS NOTES
OB Progress note    I present at the patient's bedside to evaluate her pushing progress.  The patient started pushing at +1 station.  She has been pushing for approximately 55 minutes.  Upon my vaginal examination, the patient is complete complete +2 station at rest.  There is some mild molding of the fetal head and mild caput.  With contraction and maternal expulsive efforts the patient pushes the fetus to a +3 almost +4 station.  The patient has good expulsive efforts.  Maternal pelvis feels adequate.  The fetal heart rate tracing is reassuring.  Will continue with maternal pushing efforts and anticipate vaginal birth.    Electronically signed by Andres Pruitt MD, 03/16/24, 10:38 AM EDT.

## 2024-03-16 NOTE — ANESTHESIA PROCEDURE NOTES
Labor Epidural      Patient location during procedure: OB  Start Time: 3/16/2024 2:43 AM  Stop Time: 3/16/2024 2:58 AM  Performed By  CRNA/CAA: Cordell Hernandez CRNA  Preanesthetic Checklist  Completed: patient identified, IV checked, risks and benefits discussed, surgical consent, monitors and equipment checked, pre-op evaluation and timeout performed  Additional Notes  Pt was very anxious and sensitive to everything that was happening, ie contractions, positioning etc.  She was saying ow ow ow ow for the majority of the procedure.  She was sensitive to the pressure of the tuohy.  But she never flinched or pulled away or gave any objective indication that she was having pain or paresthesia from the procedure. Everything just hurt.  Her heart rate was fluctuating somewhat in accordance  The placement actually went very smoothly and quickly. I gave a larger test dose than I usually do because of the way her heart rate had been going up and down 10-15 points on its own.  I wanted to make sure I had a definitive response in case of an intravascular placement.    Prep:  Pt Position:sitting  Sterile Tech:cap, gloves, mask and sterile barrier  Prep:povidone-iodine 7.5% surgical scrub  Monitoring:blood pressure monitoring, continuous pulse oximetry and EKG  Epidural Block Procedure:  Approach:midline  Guidance:landmark technique  Location:L3-L4  Needle Type:Tuohy  Needle Gauge:17 G  Loss of Resistance Medium: saline  Loss of Resistance: 9cm  Cath Depth at skin:15 cm  Paresthesia: none  Aspiration:negative  Test Dose:negative  Medication: ropivacaine (NAROPIN) 0.2 % injection - Injection   8 mL - 3/16/2024 2:56:00 AM  lidocaine 1.5%-EPINEPHrine 1:200,000 (XYLOCAINE W/EPI) injection - Epidural   4 mL - 3/16/2024 2:53:00 AM  Number of Attempts: 1  Post Assessment:  Dressing:occlusive dressing applied and secured with tape  Pt Tolerance:patient tolerated the procedure well with no apparent  complications  Complications:no

## 2024-03-16 NOTE — NURSING NOTE
Verbal orders obtained from Dr. Howell for patient to ambulate the unit and obtaining FHR q 15 minutes. Patient receptive to POC.

## 2024-03-16 NOTE — PROGRESS NOTES
OB Intrapartum Note    Subjective: No complaints, Comfortable with epidural    Objective:  Baseline: Normal 110-160 bpm  Variability:   Moderate/Normal (amplitude 6-25 bpm)  Accelerations: Present (32 weeks+) 15 x 15 bpm  Decelerations: None  Contractions:  Regular, q2min    Cervical exam:    Dilation: 5cm    Effacement: 90%    Station: -2    Impression:    Category I  Reassuring fetus, Adequate progress, Pain controlled    Plan:   Continue pitocin  Continue to monitor  Active labor positioning  Pelvis feels clinically adequate  Reviewed course/progress/plan with pt.  All questions answered to pts satisfaction.  Pt desires to proceed as outlined.  I have discussed in detail with patient the current plan to include, but NLT, appropriate expectations for labor and delivery, to include possible length of time expected for delivery and hospital stay.  All questions have been answered to pts satisfaction and pt desires to proceed as noted.        Electronically signed by Martha Howell DO, 03/16/24, 5:29 AM EDT.

## 2024-03-16 NOTE — PLAN OF CARE
Goal Outcome Evaluation:  Plan of Care Reviewed With: patient, spouse        Progress: improving

## 2024-03-17 LAB
HCT VFR BLD AUTO: 25 % (ref 34–46.6)
HGB BLD-MCNC: 8.2 G/DL (ref 12–15.9)

## 2024-03-17 PROCEDURE — 85014 HEMATOCRIT: CPT | Performed by: OBSTETRICS & GYNECOLOGY

## 2024-03-17 PROCEDURE — 85018 HEMOGLOBIN: CPT | Performed by: OBSTETRICS & GYNECOLOGY

## 2024-03-17 RX ORDER — LABETALOL 100 MG/1
100 TABLET, FILM COATED ORAL EVERY 12 HOURS SCHEDULED
Status: DISCONTINUED | OUTPATIENT
Start: 2024-03-17 | End: 2024-03-18

## 2024-03-17 RX ORDER — LABETALOL 100 MG/1
100 TABLET, FILM COATED ORAL 2 TIMES DAILY
Qty: 60 TABLET | Refills: 1 | Status: CANCELLED | OUTPATIENT
Start: 2024-03-17

## 2024-03-17 RX ADMIN — LABETALOL HYDROCHLORIDE 100 MG: 100 TABLET, FILM COATED ORAL at 12:45

## 2024-03-17 RX ADMIN — VITAMIN A, VITAMIN C, VITAMIN D, VITAMIN E, THIAMINE, RIBOFLAVIN, NIACIN, VITAMIN B6, FOLIC ACID, VITAMIN B12, CALCIUM, IRON, ZINC, COPPER 1 TABLET: 4000; 120; 400; 22; 1.84; 3; 20; 10; 1; 12; 200; 27; 25; 2 TABLET ORAL at 08:19

## 2024-03-17 RX ADMIN — LABETALOL HYDROCHLORIDE 100 MG: 100 TABLET, FILM COATED ORAL at 21:55

## 2024-03-17 RX ADMIN — DOCUSATE SODIUM 100 MG: 100 CAPSULE, LIQUID FILLED ORAL at 20:08

## 2024-03-17 RX ADMIN — WITCH HAZEL: 500 SOLUTION RECTAL; TOPICAL at 20:47

## 2024-03-17 RX ADMIN — CALCIUM CARBONATE 2 TABLET: 500 TABLET, CHEWABLE ORAL at 05:39

## 2024-03-17 RX ADMIN — IBUPROFEN 600 MG: 600 TABLET, FILM COATED ORAL at 20:08

## 2024-03-17 RX ADMIN — IBUPROFEN 600 MG: 600 TABLET, FILM COATED ORAL at 06:10

## 2024-03-17 RX ADMIN — DOCUSATE SODIUM 100 MG: 100 CAPSULE, LIQUID FILLED ORAL at 08:19

## 2024-03-17 RX ADMIN — IBUPROFEN 600 MG: 600 TABLET, FILM COATED ORAL at 12:44

## 2024-03-17 NOTE — LACTATION NOTE
Called to assist with first feeding, pt just finished on left side and baby moved to right side, pt with shorter nipples and baby with some difficulty maintaining latch. Pt assisted with positioning and latching, baby did feed for about 10 min and then swaddled and given to Dad due to pt needing assistance from RN for personal care. Discussed attempting to feed baby every 3 hrs, allowing unlimited access to breast with unlimited time on breast. Encouraged her to do awake skin to skin as much as possible. LC discussed normal  feeding behavior during the first few days of breastfeeding. I went over waking techniques and how to keep baby awake at breast. Encouraged pt to call out as needed for LC/staff assistance.

## 2024-03-17 NOTE — DISCHARGE INSTRUCTIONS
Gradually resume normal activity  Pelvic rest, nothing in the vagina, no tampons, no douching, and no intercourse for 6 weeks.  The patient is to call for heavy vaginal bleeding soaking a pad in less than 1 hour, temperature greater than 100 °F, nausea vomiting with inability to tolerate oral intake, systolic blood pressure greater than 150 or diastolic blood pressure greater than 105, unusual headache, vision changes, right upper quadrant or epigastric pain.

## 2024-03-17 NOTE — LACTATION NOTE
Pt states she pumped some during night is going to manual pump this morning, is getting about 1cc each pumping. Home pump given. Discussed trying to breastfeed using small nipple shield when she is ready, encouraged her to call out for assistance.

## 2024-03-17 NOTE — LACTATION NOTE
Pt states Manda assisted her with using small nipple shield and latching baby, denied any pain or discomfort with feeding using nipple shield, states milk noted in shield. She is still giving some supplement to baby after feeding at breast. Encouraged her to call out for assistance as needed.

## 2024-03-17 NOTE — LACTATION NOTE
Ask to assist with feeding due to baby with hypoglycemia requiring glucose gel. Pt with very sensitive nipples and baby sleepy, took about 10 min to get baby awake and wanting to latch, once baby latching pt was not able to tolerate baby latching to breast. Pt agreeable to supplementation and baby supplemented with 18cc Neosure, baby tolerated well. Discussed continued pumping with pt as she started pumping during labor to collect colostrum and help with labor progress. Pt sized to 21mm flanges. Encourage pt to call out for assistance when latching or if she needed help with pump.

## 2024-03-17 NOTE — ANESTHESIA POSTPROCEDURE EVALUATION
Patient: Sherri SEBASTIAN    Procedure Summary       Date: 03/16/24 Room / Location:     Anesthesia Start: 0243 Anesthesia Stop: 1145    Procedure: LABOR ANALGESIA Diagnosis:     Scheduled Providers:  Provider: Cordell Hernandez CRNA    Anesthesia Type: Not recorded ASA Status: Not recorded            Anesthesia Type: No value filed.    Vitals  Vitals Value Taken Time   /80 03/17/24 1458   Temp 37.1 °C (98.8 °F) 03/17/24 1458   Pulse 92 03/17/24 1458   Resp 16 03/17/24 1458   SpO2 99 % 03/17/24 1458           Post Anesthesia Care and Evaluation    Patient location during evaluation: bedside  Patient participation: complete - patient participated  Level of consciousness: awake and alert  Pain score: 1  Pain management: adequate    Airway patency: patent  Anesthetic complications: No anesthetic complications  PONV Status: none  Cardiovascular status: acceptable  Respiratory status: acceptable  Hydration status: acceptable  Post Neuraxial Block status: Motor and sensory function returned to baseline and No signs or symptoms of PDPHNo anesthesia care post op

## 2024-03-17 NOTE — PROGRESS NOTES
PostPartum/PostOp PROGRESS NOTE        Subjective:  Patient has no complaints  Pain controlled  Tolerating a regular diet  Passing flatus  Ambulating  Urinating spontaneously  Lochia decreasing, no bleeding concerns  Denies HA, vision change, or RUQ/epigastric pain  No lightheadedness or dizziness    Objective:  Vitals: Blood pressure 140/90, pulse 95, temperature 98.1 °F (36.7 °C), temperature source Oral, resp. rate 16, last menstrual period 2023, SpO2 100%, currently breastfeeding.          General: NAD, alert and oriented, appropriate  Psych: Normal mood, appropriate  Abdomen: Fundus firm, non tender, Soft, non distended, non tender, normal active bowel sounds, and Fundus at U-2  Extremeties: +1 edema    Labs:  Lab Results (last 24 hours)       Procedure Component Value Units Date/Time    Blood Gas, Arterial, Cord [[id number]]  (Abnormal) Collected: 24 1148    Specimen: Cord Blood Arterial from Umbilical Cord Updated: 24 1155     pH, Cord Arterial 7.32 pH Units      pCO2, Cord Arterial 40.5 mmHg      Base Exc, Cord Arterial -5.4 mmol/L      pO2, Cord Arterial <40.5 mmHg      HCO3, Cord Arterial 20.3 mmol/L     Blood Gas, Venous, Cord [[id number]]  (Abnormal) Collected: 24 114    Specimen: Cord Blood Venous from Umbilical Cord Updated: 24 1157     pH, Cord Venous 7.351 pH Units      pCO2, Cord Venous 41.3 mm Hg      pO2, Cord Venous <40.5 mm Hg      Base Excess, Cord Venous -3.1 mmol/L      HCO3, Cord Venous 22.3 mmol/L     Hemoglobin & Hematocrit, Blood [[id number]]  (Abnormal) Collected: 24 06    Specimen: Blood from Arm, Right Updated: 24     Hemoglobin 8.2 g/dL      Hematocrit 25.0 %               Assessment:    Post-partum/postop Day:  1  Status post   Chronic hypertension    Plan:   Routine postpartum/postop care  Ambulate, Remove IV, Shower, Sitz baths, PO pain meds, Importance of wound care/keep clean and dry, Breast feeding support  The majority the  patient's blood pressures have been elevated since delivery.  I have recommended going ahead with treatment.  Plan to start labetalol 100 mg p.o. twice daily    Electronically signed by Andres Pruitt MD, 03/17/24, 11:28 AM EDT.

## 2024-03-18 ENCOUNTER — TELEPHONE (OUTPATIENT)
Dept: OBSTETRICS AND GYNECOLOGY | Facility: CLINIC | Age: 29
End: 2024-03-18
Payer: COMMERCIAL

## 2024-03-18 VITALS
DIASTOLIC BLOOD PRESSURE: 62 MMHG | RESPIRATION RATE: 16 BRPM | OXYGEN SATURATION: 99 % | TEMPERATURE: 98.3 F | SYSTOLIC BLOOD PRESSURE: 137 MMHG | HEART RATE: 102 BPM

## 2024-03-18 RX ORDER — IBUPROFEN 600 MG/1
600 TABLET ORAL EVERY 6 HOURS PRN
Qty: 60 TABLET | Refills: 1 | Status: SHIPPED | OUTPATIENT
Start: 2024-03-18

## 2024-03-18 RX ORDER — IBUPROFEN 800 MG/1
800 TABLET ORAL EVERY 6 HOURS PRN
Qty: 50 TABLET | Refills: 0 | Status: SHIPPED | OUTPATIENT
Start: 2024-03-18 | End: 2024-03-18 | Stop reason: HOSPADM

## 2024-03-18 RX ORDER — LABETALOL 200 MG/1
200 TABLET, FILM COATED ORAL 2 TIMES DAILY
Qty: 60 TABLET | Refills: 1 | Status: SHIPPED | OUTPATIENT
Start: 2024-03-18

## 2024-03-18 RX ORDER — DOCUSATE SODIUM 100 MG/1
100 CAPSULE, LIQUID FILLED ORAL 2 TIMES DAILY
Qty: 60 CAPSULE | Refills: 1 | Status: SHIPPED | OUTPATIENT
Start: 2024-03-18

## 2024-03-18 RX ORDER — ACETAMINOPHEN 325 MG/1
1000 TABLET ORAL EVERY 6 HOURS PRN
Qty: 50 TABLET | Refills: 0 | Status: SHIPPED | OUTPATIENT
Start: 2024-03-18

## 2024-03-18 RX ORDER — LABETALOL 100 MG/1
200 TABLET, FILM COATED ORAL EVERY 12 HOURS SCHEDULED
Status: DISCONTINUED | OUTPATIENT
Start: 2024-03-18 | End: 2024-03-18 | Stop reason: HOSPADM

## 2024-03-18 RX ADMIN — IBUPROFEN 600 MG: 600 TABLET, FILM COATED ORAL at 09:59

## 2024-03-18 RX ADMIN — LABETALOL HYDROCHLORIDE 200 MG: 100 TABLET, FILM COATED ORAL at 08:31

## 2024-03-18 RX ADMIN — DOCUSATE SODIUM 100 MG: 100 CAPSULE, LIQUID FILLED ORAL at 08:31

## 2024-03-18 RX ADMIN — VITAMIN A, VITAMIN C, VITAMIN D, VITAMIN E, THIAMINE, RIBOFLAVIN, NIACIN, VITAMIN B6, FOLIC ACID, VITAMIN B12, CALCIUM, IRON, ZINC, COPPER 1 TABLET: 4000; 120; 400; 22; 1.84; 3; 20; 10; 1; 12; 200; 27; 25; 2 TABLET ORAL at 08:31

## 2024-03-18 NOTE — DISCHARGE SUMMARY
[facility]  Delivery Discharge Summary    Patient Name: Sherri SEBASTIAN  : 1995  MRN: [id number]    Date of Admission: 3/15/2024  Date of Discharge:  3/18/2024   Primary Care Physician: Provider, No Known  Consults       No orders found from 2/15/2024 to 3/16/2024.             Procedures:  3/16/2024  - Vaginal, Spontaneous      Admitting Diagnosis:   premature rupture of membranes [O42.919]    Discharge Diagnosis:  Same as Admitting plus:   Pregnancy at 37w0d - Delivered     Delivery Summary     OB Surgeon:  Andres Pruitt MD  Anesthesia: Epidural  Delivery Type:   Perineum: OBPERINEUM: 2nd degree laceration  Feeding method: Breast and Bottle    Infant: male  infant;    Weight: 2740 g (6 lb 0.7 oz)     APGARS: 8  @ 1 minute / 9  @ 5 minutes     Venous Blood Gas:   pH, Cord Venous   Date Value Ref Range Status   2024 7.351 7.310 - 7.370 pH Units Final     Base Excess, Cord Venous   Date Value Ref Range Status   2024 -3.1 (L) -2.0 - 2.0 mmol/L Final      Arterial Blood Gas:   pH, Cord Arterial   Date Value Ref Range Status   2024 7.32 (H) 7.21 - 7.31 pH Units Final     Base Exc, Cord Arterial   Date Value Ref Range Status   2024 -5.4 (L) -2.0 - 2.0 mmol/L Final        Hospital Course     Hospital Course:    Patient is a 29 y.o.  who at 37w0d had a Vaginal delivery birth.  Her postpartum course was without complications.    On PPD # 2 she was ready for discharge.    She had normal lochia and pain well controlled with oral medications    Day of Discharge     Vital Signs:  Temp:  [98 °F (36.7 °C)-98.8 °F (37.1 °C)] 98.3 °F (36.8 °C)  Heart Rate:  [] 102  Resp:  [16] 16  BP: (137-158)/(62-92) 137/62    On the day of discharge POSTPARTUM pt tolerating a regular diet, ambulating, pain well controlled, urinating spontaneously and lochia appropriate.   Vital signs were stable and afebrile.  Exam was within normal limits.  Fundus was below umbilicus and nontender.  Meeting  discharge criteria and desired discharge home.  Postpartum instructions and FU reviewed and questions answered.    Pertinent  and/or Most Recent Results     LAB RESULTS:   Lab Results   Component Value Date    WBC 9.49 03/15/2024    HGB 8.2 (L) 03/17/2024    HCT 25.0 (L) 03/17/2024     03/15/2024       Discharge Details        Discharge Medications        New Medications        Instructions Start Date   acetaminophen 325 MG tablet  Commonly known as: TYLENOL   975 mg, Oral, Every 6 Hours PRN      benzocaine-menthol 20-0.5 % aerosol topical spray  Commonly known as: DERMOPLAST   Topical, As Needed      docusate sodium 100 MG capsule  Commonly known as: Colace   100 mg, Oral, 2 Times Daily      ibuprofen 600 MG tablet  Commonly known as: ADVIL,MOTRIN   600 mg, Oral, Every 6 Hours PRN      labetalol 200 MG tablet  Commonly known as: NORMODYNE   200 mg, Oral, 2 Times Daily             Continue These Medications        Instructions Start Date   prenatal vitamin 27-0.8 27-0.8 MG tablet tablet   1 tablet, Oral, Daily               No Known Allergies    Discharge Disposition:   Home, self-care    Discharge Condition:  Good    Follow Up:  Future Appointments   Date Time Provider Department Center   3/20/2024  1:40 PM Andres Pruitt MD [facility]   4/8/2024  3:00 PM Andres Pruitt MD [facility]       Electronically signed by Kendal Hess MD, 03/18/24, 11:58 AM EDT.

## 2024-03-18 NOTE — TELEPHONE ENCOUNTER
Patient was discharged today from her delivery. They forgot to give her an MMR vaccine today before she left. She called L&D and they told her to call her OB to get a prescription and then she could go to the hospital to get it. Can she get a script for that or does she just need to go to her PCP or [facility]. Please advise.

## 2024-03-18 NOTE — LACTATION NOTE
Pt states she hasn't been feeling well this morning so hasn't latched baby or pumped, baby is taking neosure well. D/C instructions gone over, included hand hygiene, respiratory hygiene and breastfeeding when mom is sick, LC encouraged pt to see pediatrician within two days of discharge for follow up. LC discussed  breastfeeding behaviors, first two weeks of breastfeeding expectations, encouraged her to breastfeed/pump frequently for good milk supply. LC discussed nipple care, plugged ducts, engorgement, and breast infection. LC informed pt that LC was available after D/C for assistance with breastfeeding.

## 2024-03-18 NOTE — PLAN OF CARE
Problem: Adult Inpatient Plan of Care  Goal: Plan of Care Review  Outcome: Ongoing, Progressing  Goal: Patient-Specific Goal (Individualized)  Outcome: Ongoing, Progressing  Goal: Absence of Hospital-Acquired Illness or Injury  Outcome: Ongoing, Progressing  Intervention: Identify and Manage Fall Risk  Recent Flowsheet Documentation  Taken 3/18/2024 0500 by Anamika Houston RN  Safety Promotion/Fall Prevention: safety round/check completed  Taken 3/18/2024 0315 by Anamika Houston RN  Safety Promotion/Fall Prevention: safety round/check completed  Taken 3/17/2024 2300 by Anamika Houston RN  Safety Promotion/Fall Prevention: safety round/check completed  Taken 3/17/2024 2008 by Anamika Houston RN  Safety Promotion/Fall Prevention: safety round/check completed  Intervention: Prevent and Manage VTE (Venous Thromboembolism) Risk  Recent Flowsheet Documentation  Taken 3/17/2024 2008 by Anamika Houston RN  Activity Management: up ad isabella  Goal: Optimal Comfort and Wellbeing  Outcome: Ongoing, Progressing  Goal: Readiness for Transition of Care  Outcome: Ongoing, Progressing     Problem: Hypertensive Disorders in Pregnancy  Goal: Maternal-Fetal Stabilization  Outcome: Ongoing, Progressing     Problem: Behavioral Health Comorbidity  Goal: Maintenance of Behavioral Health Symptom Control  Outcome: Ongoing, Progressing     Problem: Hypertension Comorbidity  Goal: Blood Pressure in Desired Range  Outcome: Ongoing, Progressing     Problem: Breastfeeding  Goal: Effective Breastfeeding  Outcome: Ongoing, Progressing   Goal Outcome Evaluation:

## 2024-03-18 NOTE — PROGRESS NOTES
[facility]  Vaginal Delivery Progress Note    Subjective   Postpartum Day 2: Vaginal Delivery    The patient feels well.  Her pain is well controlled with nonsteroidal anti-inflammatory drugs and Tylenol.   She is ambulating well.  Patient describes her bleeding as moderate lochia.    Feeding:    breast and bottle feeding          Objective     Vital Signs Range for the last 24 hours  Temperature: Temp:  [98 °F (36.7 °C)-98.8 °F (37.1 °C)] 98.3 °F (36.8 °C)   Temp Source: Temp src: Oral   BP: BP: (137-158)/(62-92) 137/62   Pulse: Heart Rate:  [] 102   Respirations: Resp:  [16] 16       Physical Exam:  General:  no acute distress.  Abdomen: abdomen is soft without significant tenderness, masses, organomegaly or guarding.   Fundus: appropriate, firm, non tender, small lochia   Extremities: normal, atraumatic    Rubella:   Rubella Antibodies, IgG   Date Value Ref Range Status   2023 <0.90 (L) Immune >0.99 index Final     Comment:                                     Non-immune       <0.90                                  Equivocal  0.90 - 0.99                                  Immune           >0.99     Rh Status:    RH type   Date Value Ref Range Status   03/15/2024 Positive  Final     Immunizations:   Immunization History   Administered Date(s) Administered    COVID-19 (MODERNA) 1st,2nd,3rd Dose Monovalent 2021, 2021    DTaP, Unspecified 1998    HPV Quadrivalent 2012    MMR 1998    OPV 1998    Varicella 09/10/1997       Assessment & Plan        (normal spontaneous vaginal delivery)    Chronic hypertension affecting pregnancy    Obesity affecting pregnancy, antepartum    Rubella non-immune status, antepartum    Maternal anemia in pregnancy, antepartum     premature rupture of membranes    Second degree perineal laceration during delivery      Day 2  post-partum  Vaginal, Spontaneous   .      Plan:  Continue current care. Labetalol increased to 200 mg  today.      Electronically signed by Kendal Hess MD, 03/18/24, 11:54 AM EDT.

## 2024-03-18 NOTE — PLAN OF CARE
Problem: Adult Inpatient Plan of Care  Goal: Plan of Care Review  Outcome: Met  Goal: Patient-Specific Goal (Individualized)  Outcome: Met  Goal: Absence of Hospital-Acquired Illness or Injury  Outcome: Met  Intervention: Identify and Manage Fall Risk  Description: Perform standard risk assessment on admission using a validated tool or comprehensive approach appropriate to the patient; reassess fall risk frequently, with change in status or transfer to another level of care.  Communicate fall injury risk to interprofessional healthcare team.  Determine need for increased observation, equipment and environmental modification, such as low bed, signage and supportive, nonskid footwear.  Adjust safety measures to individual developmental age, stage and identified risk factors.  Reinforce the importance of safety and physical activity with patient and family.  Perform regular intentional rounding to assess need for position change, pain assessment and personal needs, including assistance with toileting.  Recent Flowsheet Documentation  Taken 3/18/2024 0831 by Silva Alba, RN  Safety Promotion/Fall Prevention: safety round/check completed  Intervention: Prevent and Manage VTE (Venous Thromboembolism) Risk  Description: Assess for VTE (venous thromboembolism) risk.  Encourage and assist with early ambulation.  Initiate and maintain compression or other therapy, as indicated, based on identified risk in accordance with organizational protocol and provider order.  Encourage both active and passive leg exercises while in bed, if unable to ambulate.  Recent Flowsheet Documentation  Taken 3/18/2024 0831 by Silva Alba, RN  Activity Management: up ad isabella  Goal: Optimal Comfort and Wellbeing  Outcome: Met  Intervention: Provide Person-Centered Care  Description: Use a family-focused approach to care.  Develop trust and rapport by proactively providing information, encouraging questions, addressing concerns and offering  reassurance.  Acknowledge emotional response to hospitalization.  Recognize and utilize personal coping strategies.  Honor spiritual and cultural preferences.  Recent Flowsheet Documentation  Taken 3/18/2024 0831 by Silva Alba, RN  Trust Relationship/Rapport:   care explained   choices provided   emotional support provided   empathic listening provided   questions answered   questions encouraged   reassurance provided   thoughts/feelings acknowledged  Goal: Readiness for Transition of Care  Outcome: Met     Problem: Hypertensive Disorders in Pregnancy  Goal: Maternal-Fetal Stabilization  Outcome: Met  Intervention: Monitor and Manage Symptom Progression  Description: Note behavioral changes (e.g., restlessness).  Monitor for report of headache not relieved by pharmacologic therapy or visual disturbance.  Implement seizure precautions.  Evaluate any complaint of epigastric or abdominal pain.  Anticipate initiation and titration of magnesium sulfate infusion for seizure prevention. Note: Magnesium sulfate has also been identified to provide neuroprotection with gestations of less than 32 weeks.  Assess for signs of bleeding (vaginal or other sites, such as intravenous site, gums).  Evaluate for presence of respiratory compromise by regularly auscultating breath sounds and monitoring pulse oximetry (continuous if on magnesium sulfate); report presence of chest pain, decreased oxygen saturation, cough and shortness of breath.  Provide calm, reassuring presence; offer clear explanation of events.  Prepare for delivery, planned or emergent, based on change in maternal-fetal status.  Recent Flowsheet Documentation  Taken 3/18/2024 0831 by Silva Alba, RN  Medication Review/Management: medications reviewed     Problem: Behavioral Health Comorbidity  Goal: Maintenance of Behavioral Health Symptom Control  Outcome: Met  Intervention: Maintain Behavioral Health Symptom Control  Description: Confirm mental health  diagnosis and current treatment.  Evaluate adherence to previously identified self-management plan.  Advocate continuation of home strategies, including medication.  Evaluate effectiveness of self-management strategies and coping skills.  Communicate with providers to ensure continuity and follow-up at transition.  Recent Flowsheet Documentation  Taken 3/18/2024 0831 by Silva Alba RN  Medication Review/Management: medications reviewed     Problem: Hypertension Comorbidity  Goal: Blood Pressure in Desired Range  Outcome: Met  Intervention: Maintain Blood Pressure Management  Description: Evaluate adherence to home antihypertensive regimen (e.g., exercise and activity, diet modification, medication).  Provide scheduled antihypertensive medication; consider administration time and effects (e.g., avoid giving diuretic prior to bedtime).  Monitor response to antihypertensive medication therapy (e.g., blood pressure, electrolyte levels, medication effects).  Minimize risk of orthostatic hypotension; encourage caution with position changes, particularly if elderly.  Recent Flowsheet Documentation  Taken 3/18/2024 0831 by Silva Alba RN  Medication Review/Management: medications reviewed     Problem: Breastfeeding  Goal: Effective Breastfeeding  Outcome: Met   Goal Outcome Evaluation:

## 2024-03-19 LAB
ABO GROUP BLD: NORMAL
BLD GP AB SCN SERPL QL: NEGATIVE
RH BLD: POSITIVE
T&S EXPIRATION DATE: NORMAL

## 2024-03-20 ENCOUNTER — TELEPHONE (OUTPATIENT)
Dept: OBSTETRICS AND GYNECOLOGY | Facility: CLINIC | Age: 29
End: 2024-03-20

## 2024-03-20 NOTE — TELEPHONE ENCOUNTER
Provider: DR DELCID    Caller: SERGIO SEBASTIAN    Relationship to Patient: SELF    Phone Number: [phone]    Reason for Call: PT CALLED TO MAKE SURE SHE DIDN'T HAVE TO COME TO TODAYS APPT SINCE IT WAS A OB FOLLOW UP AND SHE ALREADY HAD THE BABY. I TOLD HER I WOULD CANCEL THE APPT; PT HAS POSTPARTUM APPT SCHEDULED.

## 2024-03-24 ENCOUNTER — TELEPHONE (OUTPATIENT)
Dept: LACTATION | Facility: HOSPITAL | Age: 29
End: 2024-03-24
Payer: COMMERCIAL

## 2024-03-24 NOTE — TELEPHONE ENCOUNTER
Pt states breastfeeding has been going ok, she is pumping after latching and gets about 19-24cc of milk. Baby will take about 40-50cc of supplement   Ebm/formula after feedings. Baby is gaining wt. Pt has no questions or concerns, encouraged to contact LC as needed.

## 2024-03-28 ENCOUNTER — MATERNAL SCREENING (OUTPATIENT)
Dept: CALL CENTER | Facility: HOSPITAL | Age: 29
End: 2024-03-28
Payer: COMMERCIAL

## 2024-03-28 NOTE — OUTREACH NOTE
Maternal Screening Survey      Flowsheet Row Responses   Facility patient discharged from? [facility]   Attempt successful? Yes   Call start time 140   Call end time 1407   Person spoke with today (if not patient) and relationship patient   EPD Scale: Able to Laugh 0-->as much as she always could   EPD Scale: Looked Forward 0-->as much as she ever did   EPD Scale: Blamed Self 0-->no, never   EPD Scale: Been Anxious 0-->no, not at all   EPD Scale: Felt Panicky 0-->no, not at all   EPD Scale: Things Getting on Top 0-->no, has been coping as well as ever   EPD Scale: Difficulty Sleeping 0-->no, not at all   EPD Scale: Sad or Miserable 0-->no, not at all   EPD Scale: Crying 0-->no, never   EPD Scale: Thought of Harming Self 0-->never   [place]  Depression Score 0   Did any of your parents have problems with alcohol or drug use? No   Do any of your peers have problems with alcohol or drug use? No   Does your partner have problems with alcohol or drug use? No   Before you were pregnant did you have problems with alcohol or drug use? (past) No   In the past month, did you drink beer, wine, liquor or use any other drugs? (pregnancy) No   Maternal Screening call completed Yes   Call end time 1403              Emery ZENG - Registered Nurse

## 2024-04-08 ENCOUNTER — POSTPARTUM VISIT (OUTPATIENT)
Dept: OBSTETRICS AND GYNECOLOGY | Facility: CLINIC | Age: 29
End: 2024-04-08
Payer: COMMERCIAL

## 2024-04-08 DIAGNOSIS — I10 PRIMARY HYPERTENSION: ICD-10-CM

## 2024-04-08 PROBLEM — O42.919 PRETERM PREMATURE RUPTURE OF MEMBRANES: Status: RESOLVED | Noted: 2024-03-15 | Resolved: 2024-04-08

## 2024-04-08 PROBLEM — O09.899 RUBELLA NON-IMMUNE STATUS, ANTEPARTUM: Status: RESOLVED | Noted: 2023-10-10 | Resolved: 2024-04-08

## 2024-04-08 PROBLEM — O99.019 MATERNAL ANEMIA IN PREGNANCY, ANTEPARTUM: Status: RESOLVED | Noted: 2024-01-16 | Resolved: 2024-04-08

## 2024-04-08 PROBLEM — O99.210 OBESITY AFFECTING PREGNANCY, ANTEPARTUM: Status: RESOLVED | Noted: 2023-09-19 | Resolved: 2024-04-08

## 2024-04-08 PROBLEM — O10.919 CHRONIC HYPERTENSION AFFECTING PREGNANCY: Status: RESOLVED | Noted: 2023-09-18 | Resolved: 2024-04-08

## 2024-04-08 PROBLEM — Z28.39 RUBELLA NON-IMMUNE STATUS, ANTEPARTUM: Status: RESOLVED | Noted: 2023-10-10 | Resolved: 2024-04-08

## 2024-04-08 PROCEDURE — 0503F POSTPARTUM CARE VISIT: CPT | Performed by: OBSTETRICS & GYNECOLOGY

## 2024-04-08 NOTE — ASSESSMENT & PLAN NOTE
The patient was not on any antihypertensive agents prior to the pregnancy.  She does have a history of chronic hypertension however.  Given her blood pressure is normal, we will reduce labetalol by half.  She will take 200 mg 1/2 tablet oral twice daily.  Will recheck at next office visit.

## 2024-04-08 NOTE — ASSESSMENT & PLAN NOTE
Stable postpartum course  Continue daily prenatal vitamin  Continue pelvic rest until 6 weeks postpartum

## 2024-04-08 NOTE — PROGRESS NOTES
"[facility]  Postpartum Follow Up       CC: BP check    Antepartum or Postpartum Complications: Elevated BMI- pre-pregnancy, CHTN  Delivery type:    Feeding: Bottle, Pumping    Subjective:     Headache:  No  Vision changes:  No  RUQ/epigastric pain:  No  Swelling:  No  Pain:  No  Vaginal Bleeding:  No  Depressed/Anxious:  No  EPDS score: 1  No problems or concerns today.    Objective:   /79   Pulse 80   Ht 167.6 cm (66\")   Wt 93.9 kg (207 lb)   LMP 2023 (Approximate) Comment: Started bleeding with miscarriage this day  Breastfeeding Yes   BMI 33.41 kg/m²     Physical Exam  Vitals and nursing note reviewed.   Constitutional:       General: She is not in acute distress.     Appearance: Normal appearance. She is obese. She is not ill-appearing.   Abdominal:      General: Abdomen is flat. There is no distension.      Palpations: Abdomen is soft. There is no mass.      Tenderness: There is no abdominal tenderness. There is no guarding or rebound.      Hernia: No hernia is present.   Musculoskeletal:         General: No swelling.      Right lower leg: No edema.      Left lower leg: No edema.   Skin:     General: Skin is warm and dry.      Findings: No rash.   Neurological:      Mental Status: She is alert and oriented to person, place, and time.   Psychiatric:         Mood and Affect: Mood normal.         Behavior: Behavior normal.         Thought Content: Thought content normal.         Judgment: Judgment normal.       Last PAP:   Last Completed Pap Smear            PAP SMEAR (Every 3 Years) Next due on 3/14/2025      2022  IGP,rfx Aptima HPV All Pth    2018  SCANNED - PAP SMEAR                    Assessment and Plan:  Diagnoses and all orders for this visit:    1. Encounter for postpartum visit (Primary)  Assessment & Plan:  Stable postpartum course  Continue daily prenatal vitamin  Continue pelvic rest until 6 weeks postpartum      2.  (normal spontaneous vaginal " delivery)    3. Second degree perineal laceration during delivery    4. Primary hypertension  Assessment & Plan:  The patient was not on any antihypertensive agents prior to the pregnancy.  She does have a history of chronic hypertension however.  Given her blood pressure is normal, we will reduce labetalol by half.  She will take 200 mg 1/2 tablet oral twice daily.  Will recheck at next office visit.          Counseling:    Return to office for HA unrelieved w rest/hydration/OTC meds, vision changes, increase in edema, RUQ/epigastric pain, any concerns.  Check BPs at home.  Return to office or L+D if after hours for systolic >155 OR diastolic >105.  Postpartum/Postop  precautions reviewed.    Follow Up:  Return in about 3 weeks (around 2024) for Recheck.    Andres Pruitt MD  2024

## 2024-04-09 VITALS
HEIGHT: 66 IN | WEIGHT: 227 LBS | BODY MASS INDEX: 36.48 KG/M2 | HEART RATE: 80 BPM | SYSTOLIC BLOOD PRESSURE: 118 MMHG | DIASTOLIC BLOOD PRESSURE: 79 MMHG

## 2024-05-13 NOTE — PROGRESS NOTES
"[facility]  Postpartum Follow Up Visit    CC:  Postpartum follow up     Antepartum or Postpartum Complications: Elevated BMI- pre-pregnancy  Delivery type:    Perineum : 2nd degree laceration  Feeding: Bottle    Subjective:     Headache:  No  Vision changes:  No  RUQ/epigastric pain:  No  Swelling:  No  Pain:  No  Vaginal Bleeding:  No  Depressed/Anxious:  No  EPDS score: 0  No problems    Objective:   /82   Pulse 82   Ht 167.6 cm (66\")   Wt 101 kg (223 lb)   Breastfeeding No   BMI 35.99 kg/m²     Physical Exam  Vitals and nursing note reviewed.   Constitutional:       General: She is not in acute distress.     Appearance: Normal appearance. She is not ill-appearing.   Neurological:      Mental Status: She is alert and oriented to person, place, and time.   Psychiatric:         Mood and Affect: Mood normal.         Behavior: Behavior normal.         Thought Content: Thought content normal.         Judgment: Judgment normal.       Last PAP:   Last Completed Pap Smear            PAP SMEAR (Every 3 Years) Next due on 3/14/2025      2022  IGP,rfx Aptima HPV All Pth    2018  SCANNED - PAP SMEAR                    Assessment and Plan:  Diagnoses and all orders for this visit:    1. Encounter for postpartum visit (Primary)  Assessment & Plan:  Stable postpartum course  May resume normal activities  Recommended daily multivitamin with folic acid      2.  (normal spontaneous vaginal delivery)    3. Second degree perineal laceration during delivery    4. Primary hypertension  Assessment & Plan:  BP is normal off all meds      5. Encounter for initial prescription of contraceptive pills  Assessment & Plan:  The risk, benefits, alternatives of operative control options were reviewed.  The patient is interested in an oral contraceptive.  The risks, benefits, alternatives of combined oral contraceptives were discussed including the risks of VTE.  Will start low overall 1 tablet " oral daily.  The patient will start the first day of her next menstrual cycle    Orders:  -     norgestrel-ethinyl estradiol (LOW-OGESTREL,CRYSELLE) 0.3-30 MG-MCG per tablet; Take 1 tablet by mouth Daily.  Dispense: 28 tablet; Refill: 12        Counseling:  All birth control options reviewed in detail.  R/B/A/SE/E of each wrt pts PMHx and prior BC use  DAISY risk w hormonal BIRTH CONTROL reviewed (estrogen containing only), S/Sx to watch for discussed and questions answered.  Newer studies indicate possible increased breast cancer reviewed (both estrogen and progestin only).  May resume intercourse  May resume normal activities  Core strengthening exercises reviewed and recommended  Kegel exercises reviewed and recommended  Ok to return to work/school    Follow Up:  Return for Annual physical.    Andres Pruitt MD  05/14/2024

## 2024-05-14 ENCOUNTER — POSTPARTUM VISIT (OUTPATIENT)
Dept: OBSTETRICS AND GYNECOLOGY | Facility: CLINIC | Age: 29
End: 2024-05-14
Payer: COMMERCIAL

## 2024-05-14 VITALS
HEART RATE: 82 BPM | SYSTOLIC BLOOD PRESSURE: 129 MMHG | WEIGHT: 223 LBS | DIASTOLIC BLOOD PRESSURE: 82 MMHG | BODY MASS INDEX: 35.84 KG/M2 | HEIGHT: 66 IN

## 2024-05-14 DIAGNOSIS — Z30.011 ENCOUNTER FOR INITIAL PRESCRIPTION OF CONTRACEPTIVE PILLS: ICD-10-CM

## 2024-05-14 DIAGNOSIS — I10 PRIMARY HYPERTENSION: ICD-10-CM

## 2024-05-14 PROCEDURE — 0503F POSTPARTUM CARE VISIT: CPT | Performed by: OBSTETRICS & GYNECOLOGY

## 2024-05-14 NOTE — ASSESSMENT & PLAN NOTE
Stable postpartum course  May resume normal activities  Recommended daily multivitamin with folic acid

## 2024-05-14 NOTE — ASSESSMENT & PLAN NOTE
The risk, benefits, alternatives of operative control options were reviewed.  The patient is interested in an oral contraceptive.  The risks, benefits, alternatives of combined oral contraceptives were discussed including the risks of VTE.  Will start low overall 1 tablet oral daily.  The patient will start the first day of her next menstrual cycle

## 2024-05-21 ENCOUNTER — TELEPHONE (OUTPATIENT)
Dept: OBSTETRICS AND GYNECOLOGY | Facility: CLINIC | Age: 29
End: 2024-05-21
Payer: COMMERCIAL

## 2024-05-21 DIAGNOSIS — Z32.00 ENCOUNTER FOR PREGNANCY TEST, RESULT UNKNOWN: Primary | ICD-10-CM

## 2024-05-21 NOTE — TELEPHONE ENCOUNTER
Patient called states she delivered 9 weeks ago. She has been having unprotected intercourse and had a positive test each day since Saturday. She is requesting an HCG test to confirm. Please advise if okay to order and sign attached if so.   Prior to immunization administration, verified patients identity using patient s name and date of birth. Please see Immunization Activity for additional information.     Screening Questionnaire for Adult Immunization    Are you sick today?   No   Do you have allergies to medications, food, a vaccine component or latex?   No   Have you ever had a serious reaction after receiving a vaccination?   No   Do you have a long-term health problem with heart, lung, kidney, or metabolic disease (e.g., diabetes), asthma, a blood disorder, no spleen, complement component deficiency, a cochlear implant, or a spinal fluid leak?  Are you on long-term aspirin therapy?   No   Do you have cancer, leukemia, HIV/AIDS, or any other immune system problem?   No   Do you have a parent, brother, or sister with an immune system problem?   No   In the past 3 months, have you taken medications that affect  your immune system, such as prednisone, other steroids, or anticancer drugs; drugs for the treatment of rheumatoid arthritis, Crohn s disease, or psoriasis; or have you had radiation treatments?   No   Have you had a seizure, or a brain or other nervous system problem?   No   During the past year, have you received a transfusion of blood or blood    products, or been given immune (gamma) globulin or antiviral drug?   No   For women: Are you pregnant or is there a chance you could become       pregnant during the next month?   No   Have you received any vaccinations in the past 4 weeks?   No     Immunization questionnaire answers were all negative.    I have reviewed the following standing orders:   This patient is due and qualifies for the Covid-19 vaccine.     Click here for COVID-19 Standing Order    I have reviewed the vaccines inclusion and exclusion criteria; No concerns regarding eligibility.     This patient is due and qualifies for the Influenza vaccine.    Click here for Influenza Vaccine Standing Order    I have reviewed the  vaccines inclusion and exclusion criteria; No concerns regarding eligibility.     Patient instructed to remain in clinic for 15 minutes afterwards, and to report any adverse reactions.     Screening performed by Juliana Welch on 11/2/2023 at 11:54 AM.

## 2024-05-23 ENCOUNTER — LAB (OUTPATIENT)
Dept: LAB | Facility: HOSPITAL | Age: 29
End: 2024-05-23
Payer: COMMERCIAL

## 2024-05-23 DIAGNOSIS — Z32.00 ENCOUNTER FOR PREGNANCY TEST, RESULT UNKNOWN: ICD-10-CM

## 2024-05-23 LAB — HCG INTACT+B SERPL-ACNC: <1 MIU/ML

## 2024-05-23 PROCEDURE — 36415 COLL VENOUS BLD VENIPUNCTURE: CPT

## 2024-05-23 PROCEDURE — 84702 CHORIONIC GONADOTROPIN TEST: CPT

## 2025-02-12 ENCOUNTER — LAB (OUTPATIENT)
Dept: LAB | Facility: HOSPITAL | Age: 30
End: 2025-02-12
Payer: COMMERCIAL

## 2025-02-12 ENCOUNTER — OFFICE VISIT (OUTPATIENT)
Dept: FAMILY MEDICINE CLINIC | Facility: CLINIC | Age: 30
End: 2025-02-12
Payer: COMMERCIAL

## 2025-02-12 VITALS
HEART RATE: 82 BPM | SYSTOLIC BLOOD PRESSURE: 132 MMHG | DIASTOLIC BLOOD PRESSURE: 82 MMHG | TEMPERATURE: 98 F | OXYGEN SATURATION: 100 % | HEIGHT: 66 IN | WEIGHT: 235.2 LBS | RESPIRATION RATE: 17 BRPM | BODY MASS INDEX: 37.8 KG/M2

## 2025-02-12 DIAGNOSIS — Z13.29 SCREENING FOR THYROID DISORDER: ICD-10-CM

## 2025-02-12 DIAGNOSIS — F32.A DEPRESSION, UNSPECIFIED DEPRESSION TYPE: ICD-10-CM

## 2025-02-12 DIAGNOSIS — Z76.89 ENCOUNTER TO ESTABLISH CARE: Primary | ICD-10-CM

## 2025-02-12 DIAGNOSIS — F41.1 GENERALIZED ANXIETY DISORDER: ICD-10-CM

## 2025-02-12 DIAGNOSIS — Z13.220 SCREENING, LIPID: ICD-10-CM

## 2025-02-12 DIAGNOSIS — E66.812 CLASS 2 OBESITY WITHOUT SERIOUS COMORBIDITY WITH BODY MASS INDEX (BMI) OF 37.0 TO 37.9 IN ADULT, UNSPECIFIED OBESITY TYPE: ICD-10-CM

## 2025-02-12 LAB
ALBUMIN SERPL-MCNC: 4.2 G/DL (ref 3.5–5.2)
ALBUMIN/GLOB SERPL: 1.4 G/DL
ALP SERPL-CCNC: 96 U/L (ref 39–117)
ALT SERPL W P-5'-P-CCNC: 24 U/L (ref 1–33)
ANION GAP SERPL CALCULATED.3IONS-SCNC: 10 MMOL/L (ref 5–15)
AST SERPL-CCNC: 20 U/L (ref 1–32)
BASOPHILS # BLD AUTO: 0.03 10*3/MM3 (ref 0–0.2)
BASOPHILS NFR BLD AUTO: 0.6 % (ref 0–1.5)
BILIRUB SERPL-MCNC: 0.4 MG/DL (ref 0–1.2)
BUN SERPL-MCNC: 12 MG/DL (ref 6–20)
BUN/CREAT SERPL: 10.7 (ref 7–25)
CALCIUM SPEC-SCNC: 9.5 MG/DL (ref 8.6–10.5)
CHLORIDE SERPL-SCNC: 104 MMOL/L (ref 98–107)
CHOLEST SERPL-MCNC: 164 MG/DL (ref 0–200)
CO2 SERPL-SCNC: 24 MMOL/L (ref 22–29)
CREAT SERPL-MCNC: 1.12 MG/DL (ref 0.57–1)
DEPRECATED RDW RBC AUTO: 41.3 FL (ref 37–54)
EGFRCR SERPLBLD CKD-EPI 2021: 67.6 ML/MIN/1.73
EOSINOPHIL # BLD AUTO: 0.06 10*3/MM3 (ref 0–0.4)
EOSINOPHIL NFR BLD AUTO: 1.2 % (ref 0.3–6.2)
ERYTHROCYTE [DISTWIDTH] IN BLOOD BY AUTOMATED COUNT: 12.9 % (ref 12.3–15.4)
GLOBULIN UR ELPH-MCNC: 3.1 GM/DL
GLUCOSE SERPL-MCNC: 81 MG/DL (ref 65–99)
HCT VFR BLD AUTO: 41.4 % (ref 34–46.6)
HDLC SERPL-MCNC: 39 MG/DL (ref 40–60)
HGB BLD-MCNC: 14.1 G/DL (ref 12–15.9)
IMM GRANULOCYTES # BLD AUTO: 0.02 10*3/MM3 (ref 0–0.05)
IMM GRANULOCYTES NFR BLD AUTO: 0.4 % (ref 0–0.5)
LDLC SERPL CALC-MCNC: 106 MG/DL (ref 0–100)
LDLC/HDLC SERPL: 2.66 {RATIO}
LYMPHOCYTES # BLD AUTO: 1.42 10*3/MM3 (ref 0.7–3.1)
LYMPHOCYTES NFR BLD AUTO: 27.3 % (ref 19.6–45.3)
MCH RBC QN AUTO: 29.7 PG (ref 26.6–33)
MCHC RBC AUTO-ENTMCNC: 34.1 G/DL (ref 31.5–35.7)
MCV RBC AUTO: 87.2 FL (ref 79–97)
MONOCYTES # BLD AUTO: 0.25 10*3/MM3 (ref 0.1–0.9)
MONOCYTES NFR BLD AUTO: 4.8 % (ref 5–12)
NEUTROPHILS NFR BLD AUTO: 3.42 10*3/MM3 (ref 1.7–7)
NEUTROPHILS NFR BLD AUTO: 65.7 % (ref 42.7–76)
NRBC BLD AUTO-RTO: 0 /100 WBC (ref 0–0.2)
PLATELET # BLD AUTO: 290 10*3/MM3 (ref 140–450)
PMV BLD AUTO: 9.8 FL (ref 6–12)
POTASSIUM SERPL-SCNC: 4.4 MMOL/L (ref 3.5–5.2)
PROT SERPL-MCNC: 7.3 G/DL (ref 6–8.5)
RBC # BLD AUTO: 4.75 10*6/MM3 (ref 3.77–5.28)
SODIUM SERPL-SCNC: 138 MMOL/L (ref 136–145)
T4 FREE SERPL-MCNC: 1.13 NG/DL (ref 0.92–1.68)
TRIGL SERPL-MCNC: 106 MG/DL (ref 0–150)
TSH SERPL DL<=0.05 MIU/L-ACNC: 1.44 UIU/ML (ref 0.27–4.2)
VLDLC SERPL-MCNC: 19 MG/DL (ref 5–40)
WBC NRBC COR # BLD AUTO: 5.2 10*3/MM3 (ref 3.4–10.8)

## 2025-02-12 PROCEDURE — 36415 COLL VENOUS BLD VENIPUNCTURE: CPT

## 2025-02-12 PROCEDURE — 84439 ASSAY OF FREE THYROXINE: CPT

## 2025-02-12 PROCEDURE — 80061 LIPID PANEL: CPT

## 2025-02-12 PROCEDURE — 99214 OFFICE O/P EST MOD 30 MIN: CPT

## 2025-02-12 PROCEDURE — 80050 GENERAL HEALTH PANEL: CPT

## 2025-02-12 NOTE — ASSESSMENT & PLAN NOTE
Patient's (Body mass index is 37.98 kg/m².) indicates that they are morbidly/severely obese (BMI > 40 or > 35 with obesity - related health condition) with health conditions that include none . Weight is unchanged. BMI  is above average; BMI management plan is completed. We discussed portion control and increasing exercise.

## 2025-02-12 NOTE — PROGRESS NOTES
"Chief Complaint     Establish Care (Pt here to establish care with new provider, states that she's not had a PCP for \"many years\". Pt also hoping to discuss referral to mental health provider.)    History of Present Illness     Sherri SEBASTIAN is a 30 y.o. female who presents to [facility] to establish care.      She has not had a primary care provider in years. She had a baby last March. She does see Dr. Pruitt for gynecology. She has had anxiety for a long time and is having some depression as well. No thoughts of harming herself or others. She lost her mother last February. She has been having some issues because of that.     She does want to see someone for some counseling. She does not really love taking medications but is open to it she needs to.    History      Past Medical History:   Diagnosis Date    Anxiety     Fracture of foot     left foot    Hypertension     Kidney stone      (normal spontaneous vaginal delivery) 2024    Recurrent pregnancy loss, antepartum condition or complication        Past Surgical History:   Procedure Laterality Date    CHOLECYSTECTOMY  2018    URETEROSCOPY LASER LITHOTRIPSY WITH STENT INSERTION Right 2022    Procedure: URETEROSCOPY STONE BASKETING WITH URETERAL STENT INSERTION;  Surgeon: Shanta Jay MD;  Location: [facility] OR;  Service: Urology;  Laterality: Right;       Family History   Problem Relation Age of Onset    Lung cancer Mother         with mets    Stroke Mother         x2    Hypertension Mother     No Known Problems Father     Breast cancer Paternal Aunt 38    Diabetes Maternal Grandmother     Stroke Maternal Grandfather     Skin cancer Maternal Grandfather     Lung cancer Maternal Grandfather     Heart disease Paternal Grandmother     Stroke Paternal Grandmother     Skin cancer Paternal Grandmother     Lymphoma Paternal Grandfather     Malig Hyperthermia Neg Hx     Ovarian cancer Neg Hx     Uterine " "cancer Neg Hx     Cervical cancer Neg Hx     Colon cancer Neg Hx     Stomach cancer Neg Hx     Clotting disorder Neg Hx     Malig Hypertension Neg Hx         Current Medications      No current outpatient medications on file.     Allergies     No Known Allergies    Social History       Social History     Social History Narrative    Not on file       Immunizations     Immunization:  Immunization History   Administered Date(s) Administered    COVID-19 (MODERNA) 1st,2nd,3rd Dose Monovalent 01/18/2021, 02/12/2021    DTaP, Unspecified 02/02/1998    HPV Quadrivalent 07/26/2012    MMR 02/02/1998    OPV 02/02/1998    Varicella 09/10/1997          Objective     Objective     Vital Signs:   /82 (BP Location: Left arm, Patient Position: Sitting, Cuff Size: Adult)   Pulse 82   Temp 98 °F (36.7 °C) (Temporal)   Resp 17   Ht 167.6 cm (65.98\")   Wt 107 kg (235 lb 3.2 oz)   SpO2 100%   BMI 37.98 kg/m²       Physical Exam  Constitutional:       Appearance: Normal appearance.   HENT:      Nose: Nose normal.      Mouth/Throat:      Mouth: Mucous membranes are moist.   Cardiovascular:      Rate and Rhythm: Normal rate and regular rhythm.      Pulses: Normal pulses.      Heart sounds: Normal heart sounds.   Pulmonary:      Effort: Pulmonary effort is normal.      Breath sounds: Normal breath sounds.   Skin:     General: Skin is warm and dry.   Neurological:      General: No focal deficit present.      Mental Status: She is alert and oriented to person, place, and time.   Psychiatric:         Mood and Affect: Mood normal.         Behavior: Behavior normal.         Results    The following data was reviewed by: KECIA Canales on 02/12/2025:             Assessment and Plan        Assessment and Plan    Diagnoses and all orders for this visit:    1. Encounter to establish care (Primary)    2. Generalized anxiety disorder  -     Ambulatory Referral to Behavioral Health  -     CBC w AUTO Differential; Future  -     " Comprehensive metabolic panel; Future    3. Class 2 obesity without serious comorbidity with body mass index (BMI) of 37.0 to 37.9 in adult, unspecified obesity type  Assessment & Plan:  Patient's (Body mass index is 37.98 kg/m².) indicates that they are morbidly/severely obese (BMI > 40 or > 35 with obesity - related health condition) with health conditions that include none . Weight is unchanged. BMI  is above average; BMI management plan is completed. We discussed portion control and increasing exercise.     Orders:  -     CBC w AUTO Differential; Future  -     Comprehensive metabolic panel; Future    4. Depression, unspecified depression type  -     Ambulatory Referral to Psychology  -     Comprehensive metabolic panel; Future    5. Screening, lipid  -     Lipid panel; Future    6. Screening for thyroid disorder  -     TSH+Free T4; Future              Follow Up        Follow Up   Return if symptoms worsen or fail to improve.  Patient was given instructions and counseling regarding her condition or for health maintenance advice. Please see specific information pulled into the AVS if appropriate.

## 2025-04-15 ENCOUNTER — OFFICE VISIT (OUTPATIENT)
Dept: OBSTETRICS AND GYNECOLOGY | Age: 30
End: 2025-04-15
Payer: COMMERCIAL

## 2025-04-15 VITALS
WEIGHT: 228 LBS | HEART RATE: 90 BPM | HEIGHT: 66 IN | SYSTOLIC BLOOD PRESSURE: 127 MMHG | DIASTOLIC BLOOD PRESSURE: 88 MMHG | BODY MASS INDEX: 36.64 KG/M2

## 2025-04-15 DIAGNOSIS — Z01.419 WOMEN'S ANNUAL ROUTINE GYNECOLOGICAL EXAMINATION: Primary | ICD-10-CM

## 2025-04-15 DIAGNOSIS — N93.9 ABNORMAL UTERINE BLEEDING (AUB): ICD-10-CM

## 2025-04-15 PROBLEM — Z30.011 ENCOUNTER FOR INITIAL PRESCRIPTION OF CONTRACEPTIVE PILLS: Status: RESOLVED | Noted: 2024-05-14 | Resolved: 2025-04-15

## 2025-04-15 PROCEDURE — 99459 PELVIC EXAMINATION: CPT | Performed by: OBSTETRICS & GYNECOLOGY

## 2025-04-15 PROCEDURE — G0123 SCREEN CERV/VAG THIN LAYER: HCPCS | Performed by: OBSTETRICS & GYNECOLOGY

## 2025-04-15 PROCEDURE — 99395 PREV VISIT EST AGE 18-39: CPT | Performed by: OBSTETRICS & GYNECOLOGY

## 2025-04-15 NOTE — PROGRESS NOTES
"Well Woman Visit    CC: Well woman visit    Subjective:   30 y.o. who presents for a well woman exam. Menses is irregular. They occur about every 6 weeks. Lasts about 5-6 days. Trying for pregnancy for 9 months with no success. Took over a year to conceive with her last pregnancy.    Last PAP:   Last Completed Pap Smear            Awaiting Completion       PAP SMEAR (Every 3 Years) Order placed this encounter      04/15/2025  Order placed for IGP,rfx Aptima HPV All Pth by Andres Pruitt MD    2023  IGP,CtNgTv,rfx Aptima HPV ASCU    2022  IGP,rfx Aptima HPV All Pth    2018  SCANNED - PAP SMEAR                          Last MMG:   Last Completed Mammogram    This patient has no relevant Health Maintenance data.          History: PMHx, Meds, Allergies, PSHx, Social Hx, and POBHx all reviewed and updated.    /88   Pulse 90   Ht 167.6 cm (65.98\")   Wt 103 kg (228 lb)   LMP 2025   Breastfeeding No   BMI 36.82 kg/m²     Physical Exam  Vitals and nursing note reviewed. Exam conducted with a chaperone present.   Constitutional:       General: She is not in acute distress.     Appearance: Normal appearance. She is not ill-appearing.   HENT:      Head: Normocephalic and atraumatic.      Mouth/Throat:      Mouth: Mucous membranes are moist.      Pharynx: Oropharynx is clear.   Eyes:      Extraocular Movements: Extraocular movements intact.   Neck:      Thyroid: No thyroid mass or thyromegaly.   Chest:   Breasts:     Breasts are symmetrical.      Right: Normal. No swelling, bleeding, inverted nipple, mass, nipple discharge, skin change or tenderness.      Left: Normal. No swelling, bleeding, inverted nipple, mass, nipple discharge, skin change or tenderness.   Abdominal:      General: There is no distension.      Palpations: Abdomen is soft. There is no mass.      Tenderness: There is no abdominal tenderness.      Hernia: There is no hernia in the left inguinal area or right " inguinal area.   Genitourinary:     General: Normal vulva.      Exam position: Lithotomy position.      Pubic Area: No rash.       Labia:         Right: No rash, tenderness, lesion or injury.         Left: No rash, tenderness, lesion or injury.       Urethra: No prolapse, urethral pain or urethral lesion.      Vagina: Normal. No vaginal discharge, erythema, tenderness, bleeding or prolapsed vaginal walls.      Cervix: Normal. No friability, lesion, erythema or cervical bleeding.      Uterus: Normal. Not enlarged, not fixed, not tender and no uterine prolapse.       Adnexa:         Right: No mass or tenderness.          Left: No mass or tenderness.     Musculoskeletal:         General: No swelling.      Right lower leg: No edema.      Left lower leg: No edema.   Lymphadenopathy:      Upper Body:      Right upper body: No supraclavicular or axillary adenopathy.      Left upper body: No supraclavicular or axillary adenopathy.   Skin:     General: Skin is warm and dry.      Findings: No rash.   Neurological:      Mental Status: She is alert and oriented to person, place, and time.   Psychiatric:         Mood and Affect: Mood normal.         Behavior: Behavior normal.         Thought Content: Thought content normal.         Assessment and Plan:  Diagnoses and all orders for this visit:    1. Women's annual routine gynecological examination (Primary)  Assessment & Plan:  Pap  Recommend daily multivitamin with folic acid or daily prenatal vitamin    Orders:  -     IGP,rfx Aptima HPV All Pth    2. Abnormal uterine bleeding (AUB)  Assessment & Plan:  The patient's menstrual cycles are quite abnormal.  She has a very prolonged cycle pattern.  She has also missed cycles in the past even prior to her most recent pregnancy.  Especially given her history of difficulty or prolonged period of time required to conceive this is suggestive of anovulation.  The patient is testing for ovulation at home but very inconsistently.  I  instructed her that she will need to contest consistently so we can determine if she is ovulating and if so when she is ovulating since her cycles are on a 6-week pattern.  Will start with pelvic ultrasound and then further testing as needed.  Recommend a daily multivitamin with folic acid or prenatal vitamin if not preventing pregnancy.  The patient's primary goal is to conceive.    Orders:  -     US Pelvis Complete; Future        Counseling:     Track menses, RTO IF <q21d, >7d long, or heavy    Preventative:   Recommend FLU vaccine this season, R/B discussed  s/p COVID vaccine    She understands the importance of having any ordered tests to be performed in a timely fashion.  The risks of not performing them include, but are not limited to, advanced cancer stages, bone loss from osteoporosis and/or subsequent increase in morbidity and/or mortality.  She is encouraged to review her results online and/or contact or office if she has questions.     Follow Up:  Return in about 4 weeks (around 5/13/2025) for Recheck.    Andres Pruitt MD  04/15/2025

## 2025-04-15 NOTE — ASSESSMENT & PLAN NOTE
The patient's menstrual cycles are quite abnormal.  She has a very prolonged cycle pattern.  She has also missed cycles in the past even prior to her most recent pregnancy.  Especially given her history of difficulty or prolonged period of time required to conceive this is suggestive of anovulation.  The patient is testing for ovulation at home but very inconsistently.  I instructed her that she will need to contest consistently so we can determine if she is ovulating and if so when she is ovulating since her cycles are on a 6-week pattern.  Will start with pelvic ultrasound and then further testing as needed.  Recommend a daily multivitamin with folic acid or prenatal vitamin if not preventing pregnancy.  The patient's primary goal is to conceive.

## 2025-04-18 LAB
CONV .: NORMAL
CYTOLOGIST CVX/VAG CYTO: NORMAL
CYTOLOGY CVX/VAG DOC CYTO: NORMAL
CYTOLOGY CVX/VAG DOC THIN PREP: NORMAL
DX ICD CODE: NORMAL
OTHER STN SPEC: NORMAL
SERVICE CMNT-IMP: NORMAL
STAT OF ADQ CVX/VAG CYTO-IMP: NORMAL

## 2025-05-01 ENCOUNTER — OFFICE VISIT (OUTPATIENT)
Dept: BEHAVIORAL HEALTH | Facility: CLINIC | Age: 30
End: 2025-05-01
Payer: COMMERCIAL

## 2025-05-01 VITALS
SYSTOLIC BLOOD PRESSURE: 140 MMHG | DIASTOLIC BLOOD PRESSURE: 87 MMHG | HEART RATE: 88 BPM | BODY MASS INDEX: 36.8 KG/M2 | WEIGHT: 229 LBS | HEIGHT: 66 IN

## 2025-05-01 DIAGNOSIS — F41.9 ANXIETY: ICD-10-CM

## 2025-05-01 DIAGNOSIS — F33.1 MODERATE EPISODE OF RECURRENT MAJOR DEPRESSIVE DISORDER: Primary | ICD-10-CM

## 2025-05-01 RX ORDER — ESCITALOPRAM OXALATE 5 MG/1
5 TABLET ORAL DAILY
Qty: 30 TABLET | Refills: 2 | Status: SHIPPED | OUTPATIENT
Start: 2025-05-01

## 2025-05-01 NOTE — PROGRESS NOTES
"[facility]  Initial Psychiatric Evaluation    Referring Provider:   Thank you   Marisela Brito, KECIA  [address]  [address]  [place] [address]  Your referral is greatly appreciated.    Vital Signs:   /87   Pulse 88   Ht 167.6 cm (66\")   Wt 104 kg (229 lb)   BMI 36.96 kg/m²      Chief Complaint: Depression. Anxiety.     History of Present Illness:   Sherri SEBASTIAN is a 30 y.o. female who presents today for initial psychiatric evaluation for:     ICD-10-CM ICD-9-CM   1. Moderate episode of recurrent major depressive disorder  F33.1 296.32   2. Anxiety  F41.9 300.00       2025   Depression  Has been dealing with depression and anxiety since adolescence. Several chaotic and situational stressors during childhood. Grieving her mother, has a 13 month old son. Was sole guardian and caregiver of her mother and handled her estate after she . Patient endorses gradually worsening feelings of sadness, low mood, and loss of interest in usual activities. These feelings are accompanied by anhedonia, change in appetite, losing or gaining weight, sleeping too much or not sleeping well (insomnia), fatigue and low energy most days, feeling worthless, guilty, and hopeless. Describes an inability to focus and concentrate that may interfere with daily tasks at home, work, or school. Movements that are unusually slow or agitated (a change which is often noticeable to others). Denies thinking about death and dying, suicidal ideation, planning, or intent to self-harm. These symptoms cause the patient clinically significant distress or impairment in social, occupational, or other important areas of functioning.  PHQ-9 is 5.  Anxiety   Patient experiences excessive anxiety and worry (apprehensive expectation), occurring more days than not for at least 6 months, about a number of events or activities (such as work or school performance). Finds it difficult to control the worry. The " "anxiety and worry are associated with restlessness or feeling keyed up or on edge, being easily fatigued, difficulty concentrating or mind going blank, irritability, muscle tension, and sleep disturbance (difficulty falling or staying asleep, or restless, unsatisfying sleep). The anxiety, worry, or physical symptoms cause clinically significant distress or impairment in social, occupational, or other important areas of functioning.   EVELYN-7 is 5.    Per Referring Provider 2025 She has not had a primary care provider in years. She had a baby last March. She does see Dr. Pruitt for gynecology. She has had anxiety for a long time and is having some depression as well. No thoughts of harming herself or others. She lost her mother last February. She has been having some issues because of that. She does want to see someone for some counseling. She does not really love taking medications but is open to it she needs to.    Past Psychiatric History:  Began Treatment: Adolescence  Diagnoses: Depression  Psychiatrist: Denies  Therapist: Denies  Admission History: Denies  Medication Trials: zoloft  Family history suicide attempts: Denies  Family history suicide completions: Denies  Suicide Attempts: Denies  Self Harm: Denies  Substance use/abuse: Denies  Withdrawal Symptoms: Not applicable  Longest Period Sober: Not applicable  AA: Not applicable  Trauma/Childhood/ACE: \"Chaotic\" since childhood, parents  age 2, older brother was SA her sister, he was recently released from long-term, was sole guardian of her mother and handled her estate after she   Access to Firearms: Denies    Safety/Risk Assessment: Risk of self-harm acutely and chronically is mild to moderate.    Static/Dynamic risk factors include diagnosis of mental disorder, psychosocial stressors,Recent stressor or loss and Social factors.    Record Review for 2025 : I have thoroughly reviewed the patient's electronic medical record to include " previous encounters, care everywhere, notes, medications, labs, NATALI and UDS, imaging, and EKG's.  Pertinent information is included in this note.  2/12/2025 TSH is 1.440, free T4 is 1.13, CBC and CMP are otherwise reassuring  EKG Results:  No current or pertinent labs in record  Head Imaging:  No current or pertinent labs in record    MENTAL STATUS EXAM   General Appearance:  Cleanly groomed and dressed and well developed  Eye Contact:  Good eye contact  Attitude:  Cooperative and polite  Motor Activity:  Normal gait, posture  Speech:  Normal rate, tone, volume  Mood and affect:  Normal, pleasant and euthymic  Hopelessness:  Denies  Thought Process:  Logical and goal-directed  Associations/ Thought Content:  No delusions  Hallucinations:  None  Suicidal Ideations:  Not present  Homicidal Ideation:  Not present  Sensorium:  Alert  Orientation:  Person, place, time and situation  Immediate Recall, Recent, and Remote Memory:  Intact  Attention Span/ Concentration:  Good  Fund of Knowledge:  Appropriate for age and educational level  Intellectual Functioning:  Average range  Insight:  Good  Judgement:  Good  Reliability:  Good  Impulse Control:  Good     PHQ-9 Depression Screening  PHQ-9 Total Score: 5    Little interest or pleasure in doing things? Several days   Feeling down, depressed, or hopeless? Several days   PHQ-2 Total Score 2   Trouble falling or staying asleep, or sleeping too much? Over half   Feeling tired or having little energy? Several days   Poor appetite or overeating? Not at all   Feeling bad about yourself - or that you are a failure or have let yourself or your family down? Not at all   Trouble concentrating on things, such as reading the newspaper or watching television? Not at all   Moving or speaking so slowly that other people could have noticed? Or the opposite - being so fidgety or restless that you have been moving around a lot more than usual? Not at all   Thoughts that you would be  better off dead, or of hurting yourself in some way? Not at all   PHQ-9 Total Score 5   If you checked off any problems, how difficult have these problems made it for you to do your work, take care of things at home, or get along with other people? Somewhat difficult       EVELYN-7  Feeling nervous, anxious or on edge: Not at all  Not being able to stop or control worrying: Several days  Worrying too much about different things: Several days  Trouble Relaxing: Several days  Being so restless that it is hard to sit still: Several days  Feeling afraid as if something awful might happen: Not at all  Becoming easily annoyed or irritable: Several days  EVELYN 7 Total Score: 5  If you checked any problems, how difficult have these problems made it for you to do your work, take care of things at home, or get along with other people: Not difficult at all  Review of systems is negative except for as noted in HPI.  Labs:  WBC   Date Value Ref Range Status   02/12/2025 5.20 3.40 - 10.80 10*3/mm3 Final     Platelets   Date Value Ref Range Status   02/12/2025 290 140 - 450 10*3/mm3 Final     Hemoglobin   Date Value Ref Range Status   02/12/2025 14.1 12.0 - 15.9 g/dL Final     Hematocrit   Date Value Ref Range Status   02/12/2025 41.4 34.0 - 46.6 % Final     Glucose   Date Value Ref Range Status   02/12/2025 81 65 - 99 mg/dL Final     Creatinine   Date Value Ref Range Status   02/12/2025 1.12 (H) 0.57 - 1.00 mg/dL Final     ALT (SGPT)   Date Value Ref Range Status   02/12/2025 24 1 - 33 U/L Final     AST (SGOT)   Date Value Ref Range Status   02/12/2025 20 1 - 32 U/L Final     BUN   Date Value Ref Range Status   02/12/2025 12 6 - 20 mg/dL Final     eGFR   Date Value Ref Range Status   02/12/2025 67.6 >60.0 mL/min/1.73 Final     Total Cholesterol   Date Value Ref Range Status   02/12/2025 164 0 - 200 mg/dL Final     Triglycerides   Date Value Ref Range Status   02/12/2025 106 0 - 150 mg/dL Final     HDL Cholesterol   Date Value Ref  Range Status   02/12/2025 39 (L) 40 - 60 mg/dL Final     LDL Cholesterol    Date Value Ref Range Status   02/12/2025 106 (H) 0 - 100 mg/dL Final     VLDL Cholesterol   Date Value Ref Range Status   02/12/2025 19 5 - 40 mg/dL Final     LDL/HDL Ratio   Date Value Ref Range Status   02/12/2025 2.66  Final     Hemoglobin A1C   Date Value Ref Range Status   09/16/2022 4.80 4.80 - 5.60 % Final     TSH   Date Value Ref Range Status   02/12/2025 1.440 0.270 - 4.200 uIU/mL Final     Free T4   Date Value Ref Range Status   02/12/2025 1.13 0.92 - 1.68 ng/dL Final     Last Urine Toxicity  More data exists         Latest Ref Rng & Units 3/15/2024 3/6/2024   LAST URINE TOXICITY RESULTS   Creatinine, Urine mg/dL - 60.5    Barbiturates Screen, Urine Negative Negative  -   Benzodiazepine Screen, Urine Negative Negative  -   Cocaine Screen, Urine Negative Negative  -   Fentanyl, Urine Negative Negative  -   Methadone Screen , Urine Negative Negative  -      Imaging Results:  No Images in the past 120 days found..  Allergy:   No Known Allergies   Problem List:  Patient Active Problem List   Diagnosis    Anxiety    Class 2 obesity without serious comorbidity with body mass index (BMI) of 37.0 to 37.9 in adult    Women's annual routine gynecological examination    Abnormal uterine bleeding (AUB)     Current Medications:   Current Outpatient Medications   Medication Sig Dispense Refill    escitalopram (Lexapro) 5 MG tablet Take 1 tablet by mouth Daily. 30 tablet 2     No current facility-administered medications for this visit.     Discontinued Medications:  There are no discontinued medications.  Past Surgical History:  Past Surgical History:   Procedure Laterality Date    CHOLECYSTECTOMY  05/2018    URETEROSCOPY LASER LITHOTRIPSY WITH STENT INSERTION Right 08/18/2022    Procedure: URETEROSCOPY STONE BASKETING WITH URETERAL STENT INSERTION;  Surgeon: Shanta Jay MD;  Location: [facility] MAIN OR;  Service: Urology;  Laterality:  Right;     Past Medical History:  Past Medical History:   Diagnosis Date    Anxiety     Cholelithiasis 2018    Gallbladder removed same year    Fracture of foot     left foot    Hypertension     Kidney stone      (normal spontaneous vaginal delivery) 2024    Recurrent pregnancy loss, antepartum condition or complication      Social History     Socioeconomic History    Marital status:      Spouse name: Mariusz   Tobacco Use    Smoking status: Never     Passive exposure: Never    Smokeless tobacco: Never   Vaping Use    Vaping status: Never Used   Substance and Sexual Activity    Alcohol use: Not Currently     Alcohol/week: 2.0 standard drinks of alcohol     Types: 2 Standard drinks or equivalent per week    Drug use: Never    Sexual activity: Yes     Partners: Male     Birth control/protection: None     Family History   Problem Relation Age of Onset    Lung cancer Mother         with mets    Stroke Mother         x2    Hypertension Mother     Anxiety disorder Mother     Arthritis Mother     Cancer Mother     Depression Mother     Drug abuse Mother     Early death Mother     Hyperlipidemia Mother     Miscarriages / Stillbirths Mother     No Known Problems Father     Breast cancer Paternal Aunt 38    Diabetes Maternal Grandmother     Stroke Maternal Grandfather     Skin cancer Maternal Grandfather     Lung cancer Maternal Grandfather     Cancer Maternal Grandfather     Heart disease Paternal Grandmother     Stroke Paternal Grandmother     Skin cancer Paternal Grandmother     Lymphoma Paternal Grandfather     Cancer Paternal Grandfather     Alcohol abuse Paternal Uncle         2 uncles.  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 Moderate episode of recurrent major depressive disorder (Primary)  -     escitalopram (Lexapro) 5 MG tablet; Take 1 tablet by mouth Daily.  Dispense: 30 tablet; Refill: 2    2. Anxiety  -     escitalopram (Lexapro) 5 MG tablet; Take 1 tablet by mouth Daily.  Dispense: 30 tablet; Refill: 2       Start Lexapro 5 mg daily  Please have patient go to [email or web address] and enter city or ZIP Code to find a local therapist.  They may also filter for insurance coverage, specialties, types of therapy, and choose either in person/telehealth.  Patient will need to contact the therapist to schedule an appointment.  If a referral is necessary please have patient contact the office so that we may provide the referral.  Medication Education:   LEXAPRO (ESCITALOPRAM)  Risks, benefits, alternatives discussed with patient including GI upset, nausea vomiting diarrhea, theoretical decrease of seizure threshold predisposing the patient to a slightly higher seizure risk, headaches, sexual dysfunction, serotonin syndrome, bleeding risk.  After discussion of these risks and benefits, the patient voiced understanding and agreed to proceed.    Medications:   New Medications Ordered This Visit   Medications    escitalopram (Lexapro) 5 MG tablet     Sig: Take 1 tablet by mouth Daily.     Dispense:  30 tablet     Refill:  2      NATALI  reviewed.   Discussed medication options and treatment plan of prescribed medication as well as the risks, benefits, and side effects.  Patient is agreeable to call the office with any worsening of symptoms or onset of side effects.   Patient is agreeable to call 911 or go to the nearest ER should he/she begin having SI/HI.   Patient acknowledged, is agreeable to continue with current treatment plan, and was educated on the importance of compliance with treatment and follow-up appointments.  Addressed all questions and concerns.    Psychotherapy:    Will continue therapy at future encounters.   Functional status: Moderate symptoms OR moderate difficulty in social occupational or social functioning (51-60)  Prognosis: Fair with expectation for some response to treatment  Progress: Will address progress at follow-up visits.    Follow-up: Return in about 1 month (around 6/1/2025).       This document has been electronically signed by KECIA Dawson  May 1, 2025 10:18 EDT    Please note that portions of this note were completed with a voice recognition program.  Copied text in this note has been reviewed and is accurate as of 05/01/25

## 2025-05-01 NOTE — PATIENT INSTRUCTIONS
1.  Please return to clinic at your next scheduled visit.  Please contact the clinic ([phone]) at least 24 hours prior in the event you need to cancel.  2.  Do no harm to yourself or others.    3.  Avoid alcohol and drugs.    4.  Take all medications as prescribed.  Please contact the clinic with any concerns. If you are in need of medication refills, please call the clinic at [phone].    5. Should you want to get in touch with your provider, KECIA Dawson, please contact the office ([phone]), and staff will be able to page Anali directly.  6. In the event you have personal crisis, contact the following crisis numbers: Suicide Prevention Hotline 1-410.967.4202; ISAI Helpline 4-481-628-YTRF; [facility] Emergency Room [phone]; text HELLO to 653456; or 213.     SPECIFIC RECOMMENDATIONS:     1.      Medications discussed at this encounter:     New Medications Ordered This Visit   Medications    escitalopram (Lexapro) 5 MG tablet     Sig: Take 1 tablet by mouth Daily.     Dispense:  30 tablet     Refill:  2                       2.      Psychotherapy recommendations: We will continue therapy at future visits.     3.     Return to clinic: Return in about 1 month (around 6/1/2025).

## 2025-05-15 ENCOUNTER — HOSPITAL ENCOUNTER (OUTPATIENT)
Dept: ULTRASOUND IMAGING | Facility: HOSPITAL | Age: 30
Discharge: HOME | End: 2025-05-15
Admitting: OBSTETRICS & GYNECOLOGY
Payer: COMMERCIAL

## 2025-05-15 DIAGNOSIS — N93.9 ABNORMAL UTERINE BLEEDING (AUB): ICD-10-CM

## 2025-05-15 PROCEDURE — 76830 TRANSVAGINAL US NON-OB: CPT

## 2025-06-04 ENCOUNTER — OFFICE VISIT (OUTPATIENT)
Dept: OBSTETRICS AND GYNECOLOGY | Age: 30
End: 2025-06-04
Payer: COMMERCIAL

## 2025-06-04 VITALS
BODY MASS INDEX: 35.84 KG/M2 | DIASTOLIC BLOOD PRESSURE: 87 MMHG | HEIGHT: 66 IN | WEIGHT: 223 LBS | HEART RATE: 76 BPM | SYSTOLIC BLOOD PRESSURE: 137 MMHG

## 2025-06-04 DIAGNOSIS — F41.9 ANXIETY: ICD-10-CM

## 2025-06-04 DIAGNOSIS — N93.9 ABNORMAL UTERINE BLEEDING (AUB): Primary | ICD-10-CM

## 2025-06-04 LAB — PROGEST SERPL-MCNC: 6.03 NG/ML

## 2025-06-04 PROCEDURE — 82397 CHEMILUMINESCENT ASSAY: CPT | Performed by: OBSTETRICS & GYNECOLOGY

## 2025-06-04 PROCEDURE — 84144 ASSAY OF PROGESTERONE: CPT | Performed by: OBSTETRICS & GYNECOLOGY

## 2025-06-04 PROCEDURE — 99214 OFFICE O/P EST MOD 30 MIN: CPT | Performed by: OBSTETRICS & GYNECOLOGY

## 2025-06-04 NOTE — PROGRESS NOTES
"[facility]  Gynecological Visit    CC: Follow-up ultrasound    Subjective:   30 y.o. who presents in follow-up of a pelvic ultrasound.  The patient reports she did have a menstrual cycle in May.  Her cycles still continue to be irregular.  She also had a positive ovulation predictor about 7 to 10 days ago.  No new problems or concerns    History:   Past medical history, medications, allergies, surgical history, social history, and obstetrical history all reviewed and updated.    Last Completed Pap Smear            Upcoming       PAP SMEAR (Every 3 Years) Next due on 4/15/2028      04/15/2025  IGP,rfx Aptima HPV All Pth    2023  IGP,CtNgTv,rfx Aptima HPV ASCU    2022  IGP,rfx Aptima HPV All Pth    2018  SCANNED - PAP SMEAR                          Objective:/87   Pulse 76   Ht 167.6 cm (66\")   Wt 101 kg (223 lb)   LMP 2025   Breastfeeding No   BMI 35.99 kg/m²     Physical Exam  Vitals and nursing note reviewed.   Constitutional:       General: She is not in acute distress.     Appearance: Normal appearance. She is not ill-appearing.   Neurological:      Mental Status: She is alert and oriented to person, place, and time.   Psychiatric:         Mood and Affect: Mood normal.         Behavior: Behavior normal.         Thought Content: Thought content normal.         Judgment: Judgment normal.     Pelvic ultrasound 5/15/2025 reviewed  Assessment and Plan:  Diagnoses and all orders for this visit:    1. Abnormal uterine bleeding (AUB) (Primary)  Assessment & Plan:  I reviewed the patient's pelvic ultrasound with her.  Her ultrasound findings are all reassuring.  Her clinical history suggests anovulation given her very irregular cycling.  We discussed that even if patients who have anovulatory cycling can ovulate but may ovulate very irregularly.  Recommend checking a cycle day 21 progesterone today.  Regardless of that result I would recommend repeating 1 next " month as well.  This can help establish a baseline of whether she is mostly ovulatory or mostly anovulatory.  If we are seeing consistent anovulation I would recommend ovulation induction.  If we are seeing consistent ovulation I think the next step would then be a semen analysis.  Continue to monitor menses for now.  If the patient has any suspicion for pregnancy she should take a pregnancy test and notify me ASAP of the result.  I have also recommended a daily prenatal vitamin if not preventing pregnancy.    Orders:  -     Antimullerian Hormone (AMH)  -     Progesterone    2. Anxiety  Assessment & Plan:  The patient is currently on Lexapro 5 mg daily.  We have discussed Lexapro in pregnancy given that patient would like to become pregnant.  The risk, benefits, and alternatives were reviewed.  We discussed that Lexapro is not thought to increase the risks of any congenital anomalies.  We discussed that the [employer] College of [employer] and gynecology supports the use of medication such as Lexapro during pregnancy if the patient needs such medications.  We did discuss the risks of  withdrawal symptoms postdelivery if exposing the third trimester, although these are felt to be fairly minor and short-term.  Given the patient was just started on the Lexapro by her mental health provider I would recommend she stay on this medication.  Continue Lexapro 5 mg daily          Counseling: TRACK MENSES, RTO if <q21 days (frequent) or >q3mo (infrequent IF not on hormonal BC), >7d long, heavy, or painful.      Follow Up:  Return if symptoms worsen or fail to improve.    Andres Pruitt MD  2025

## 2025-06-05 ENCOUNTER — OFFICE VISIT (OUTPATIENT)
Dept: BEHAVIORAL HEALTH | Facility: CLINIC | Age: 30
End: 2025-06-05
Payer: COMMERCIAL

## 2025-06-05 VITALS
WEIGHT: 226 LBS | HEART RATE: 74 BPM | HEIGHT: 66 IN | BODY MASS INDEX: 36.32 KG/M2 | DIASTOLIC BLOOD PRESSURE: 73 MMHG | SYSTOLIC BLOOD PRESSURE: 137 MMHG

## 2025-06-05 DIAGNOSIS — F41.9 ANXIETY: ICD-10-CM

## 2025-06-05 DIAGNOSIS — F33.1 MODERATE EPISODE OF RECURRENT MAJOR DEPRESSIVE DISORDER: Primary | ICD-10-CM

## 2025-06-05 PROBLEM — Z01.419 WOMEN'S ANNUAL ROUTINE GYNECOLOGICAL EXAMINATION: Status: RESOLVED | Noted: 2025-04-15 | Resolved: 2025-06-05

## 2025-06-05 NOTE — PROGRESS NOTES
"[facility]  Medication Management Follow-up      Record Review is below for 2025 :   2025TSH is 1.440, free T4 is 1.13, CBC and CMP are otherwise reassuring  EKG Results:  No current or pertinent labs in record  Head Imaging:  No current or pertinent labs in record  Vital Signs:   /73   Pulse 74   Ht 167.6 cm (65.98\")   Wt 103 kg (226 lb)   BMI 36.50 kg/m²     Chief Complaint: Depression. Anxiety.     History of Present Illness:   Sherri Braun is a 30 y.o. female who presents today for follow-up and medication management for:    ICD-10-CM ICD-9-CM   1. Moderate episode of recurrent major depressive disorder  F33.1 296.32   2. Anxiety  F41.9 300.00       2025 Patient is taking medications as prescribed and is tolerating them well.   Depression and Anxiety  Has noticed some improved motivation, \"not much deep thought.\" Progression of symptoms, frequency, and intensity is gradually improving. Patient continues to experience feelings of sadness, low mood, anxiety, difficulty controlling the worry, and these symptoms are causing significant distress or impairment. Patient denies feeling worthless, guilty, hopelessness, psychomotor agitation, excessive anxiety and worry,. Denies thinking about death and dying, suicidal ideation, planning, or intent to self-harm.  Denies AVH.  Clinically significant distress or impairment in social, occupational, or other important areas of functioning is gradually improving.    2025   Depression  Has been dealing with depression and anxiety since adolescence. Several chaotic and situational stressors during childhood. Grieving her mother, has a 13 month old son. Was sole guardian and caregiver of her mother and handled her estate after she . Patient endorses gradually worsening feelings of sadness, low mood, and loss of interest in usual activities. These feelings are accompanied by anhedonia, change in appetite, losing or " gaining weight, sleeping too much or not sleeping well (insomnia), fatigue and low energy most days, feeling worthless, guilty, and hopeless. Describes an inability to focus and concentrate that may interfere with daily tasks at home, work, or school. Movements that are unusually slow or agitated (a change which is often noticeable to others). Denies thinking about death and dying, suicidal ideation, planning, or intent to self-harm. These symptoms cause the patient clinically significant distress or impairment in social, occupational, or other important areas of functioning.  PHQ-9 is 5.  Anxiety   Patient experiences excessive anxiety and worry (apprehensive expectation), occurring more days than not for at least 6 months, about a number of events or activities (such as work or school performance). Finds it difficult to control the worry. The anxiety and worry are associated with restlessness or feeling keyed up or on edge, being easily fatigued, difficulty concentrating or mind going blank, irritability, muscle tension, and sleep disturbance (difficulty falling or staying asleep, or restless, unsatisfying sleep). The anxiety, worry, or physical symptoms cause clinically significant distress or impairment in social, occupational, or other important areas of functioning.   EVELYN-7 is 5.  Start Lexapro 5 mg daily      Per Referring Provider 2/12/2025 She has not had a primary care provider in years. She had a baby last March. She does see Dr. Pruitt for gynecology. She has had anxiety for a long time and is having some depression as well. No thoughts of harming herself or others. She lost her mother last February. She has been having some issues because of that. She does want to see someone for some counseling. She does not really love taking medications but is open to it she needs to.    Past Psychiatric History:  Began Treatment: Adolescence  Diagnoses: Depression  Psychiatrist: Denies  Therapist: Denies  Admission  "History: Denies  Medication Trials: zoloft  Family history suicide attempts: Denies  Family history suicide completions: Denies  Suicide Attempts: Denies  Self Harm: Denies  Substance use/abuse: Denies  Withdrawal Symptoms: Not applicable  Longest Period Sober: Not applicable  AA: Not applicable  Trauma/Childhood/ACE: \"Chaotic\" since childhood, parents  age 2, older brother was SA her sister, he was recently released from alf, was sole guardian of her mother and handled her estate after she   Access to Firearms: Denies    Safety/Risk Assessment: Risk of self-harm acutely and chronically is mild to moderate.    Static/Dynamic risk factors include diagnosis of mental disorder, psychosocial stressors,Recent stressor or loss and Social factors.      MENTAL STATUS EXAM   General Appearance:  Cleanly groomed and dressed and well developed  Eye Contact:  Good eye contact  Attitude:  Cooperative and polite  Motor Activity:  Normal gait, posture  Speech:  Normal rate, tone, volume  Mood and affect:  Normal, pleasant and euthymic  Hopelessness:  Denies  Thought Process:  Logical and goal-directed  Associations/ Thought Content:  No delusions  Hallucinations:  None  Suicidal Ideations:  Not present  Homicidal Ideation:  Not present  Sensorium:  Alert  Orientation:  Person, place, time and situation  Immediate Recall, Recent, and Remote Memory:  Intact  Attention Span/ Concentration:  Good  Fund of Knowledge:  Appropriate for age and educational level  Intellectual Functioning:  Average range  Insight:  Good  Judgement:  Good  Reliability:  Good  Impulse Control:  Good       Review of systems is negative except as noted in HPI.  Labs:  WBC   Date Value Ref Range Status   2025 5.20 3.40 - 10.80 10*3/mm3 Final     Platelets   Date Value Ref Range Status   2025 290 140 - 450 10*3/mm3 Final     Hemoglobin   Date Value Ref Range Status   2025 14.1 12.0 - 15.9 g/dL Final     Hematocrit   Date Value " Ref Range Status   02/12/2025 41.4 34.0 - 46.6 % Final     Glucose   Date Value Ref Range Status   02/12/2025 81 65 - 99 mg/dL Final     Creatinine   Date Value Ref Range Status   02/12/2025 1.12 (H) 0.57 - 1.00 mg/dL Final     ALT (SGPT)   Date Value Ref Range Status   02/12/2025 24 1 - 33 U/L Final     AST (SGOT)   Date Value Ref Range Status   02/12/2025 20 1 - 32 U/L Final     BUN   Date Value Ref Range Status   02/12/2025 12 6 - 20 mg/dL Final     eGFR   Date Value Ref Range Status   02/12/2025 67.6 >60.0 mL/min/1.73 Final     Total Cholesterol   Date Value Ref Range Status   02/12/2025 164 0 - 200 mg/dL Final     Triglycerides   Date Value Ref Range Status   02/12/2025 106 0 - 150 mg/dL Final     HDL Cholesterol   Date Value Ref Range Status   02/12/2025 39 (L) 40 - 60 mg/dL Final     LDL Cholesterol    Date Value Ref Range Status   02/12/2025 106 (H) 0 - 100 mg/dL Final     VLDL Cholesterol   Date Value Ref Range Status   02/12/2025 19 5 - 40 mg/dL Final     LDL/HDL Ratio   Date Value Ref Range Status   02/12/2025 2.66  Final     Hemoglobin A1C   Date Value Ref Range Status   09/16/2022 4.80 4.80 - 5.60 % Final     TSH   Date Value Ref Range Status   02/12/2025 1.440 0.270 - 4.200 uIU/mL Final     Free T4   Date Value Ref Range Status   02/12/2025 1.13 0.92 - 1.68 ng/dL Final      Pain Management Panel  More data exists         Latest Ref Rng & Units 3/15/2024 3/6/2024   Pain Management Panel   Creatinine, Urine mg/dL - 60.5    Barbiturates Screen, Urine Negative Negative  -   Benzodiazepine Screen, Urine Negative Negative  -   Cocaine Screen, Urine Negative Negative  -   Fentanyl, Urine Negative Negative  -   Methadone Screen , Urine Negative Negative  -      Imaging Results:  US Non-ob Transvaginal  Result Date: 5/19/2025  Impression: Unremarkable pelvic ultrasound. Electronically Signed: Yulisa Massey MD  5/19/2025 10:23 PM EDT  Workstation ID: [id number]    Current Medications:   Current Outpatient  Medications   Medication Sig Dispense Refill    escitalopram (Lexapro) 5 MG tablet Take 1 tablet by mouth Daily. 30 tablet 2     No current facility-administered medications for this visit.     Problem List:  Patient Active Problem List   Diagnosis    Anxiety    Class 2 obesity without serious comorbidity with body mass index (BMI) of 37.0 to 37.9 in adult    Abnormal uterine bleeding (AUB)     Allergy:   No Known Allergies   Discontinued Medications:  There are no discontinued medications.    PLAN:   Presentation meets DSM-V criteria for:  Diagnoses and all orders for this visit:    1. Moderate episode of recurrent major depressive disorder (Primary)    2. Anxiety          Start Lexapro 5 mg daily  Please have patient go to [email or web address] and enter city or ZIP Code to find a local therapist.  They may also filter for insurance coverage, specialties, types of therapy, and choose either in person/telehealth.  Patient will need to contact the therapist to schedule an appointment.  If a referral is necessary please have patient contact the office so that we may provide the referral.  Medication Education:   LEXAPRO (ESCITALOPRAM)  Risks, benefits, alternatives discussed with patient including GI upset, nausea vomiting diarrhea, theoretical decrease of seizure threshold predisposing the patient to a slightly higher seizure risk, headaches, sexual dysfunction, serotonin syndrome, bleeding risk.  After discussion of these risks and benefits, the patient voiced understanding and agreed to proceed.  Medications: No orders of the defined types were placed in this encounter.     NATALI reviewed.   Discussed medication options and treatment plan of prescribed medication as well as the risks, benefits, and side effects.  Patient is agreeable to call the office with any worsening of symptoms or onset of side effects.   Patient is agreeable to call 911 or go to the nearest ER should he/she begin having SI/HI.   Patient  acknowledged, is agreeable to continue with current treatment plan, and was educated on the importance of compliance with treatment and follow-up appointments.  Addressed all questions and concerns.     Psychotherapy:    Provided minimal supportive therapy.  Functional status: Some mild symptoms or some difficulty in social, occupational, or school functioning, but generally functioning pretty well, has some meaningful interpersonal relationships (61-78)  Prognosis: Good chance of responding well to treatment   Progress: gradually improving      Follow-up: Return in about 2 months (around 8/5/2025).     This document has been electronically signed by KECIA Dawson  June 5, 2025 09:06 EDT  Please note that portions of this note were completed with a voice recognition program.  Copied text in this note has been reviewed and is accurate as of 06/05/25

## 2025-06-05 NOTE — ASSESSMENT & PLAN NOTE
The patient is currently on Lexapro 5 mg daily.  We have discussed Lexapro in pregnancy given that patient would like to become pregnant.  The risk, benefits, and alternatives were reviewed.  We discussed that Lexapro is not thought to increase the risks of any congenital anomalies.  We discussed that the [employer] and gyne[employer] supports the use of medication such as Lexapro during pregnancy if the patient needs such medications.  We did discuss the risks of  withdrawal symptoms postdelivery if exposing the third trimester, although these are felt to be fairly minor and short-term.  Given the patient was just started on the Lexapro by her mental health provider I would recommend she stay on this medication.  Continue Lexapro 5 mg daily

## 2025-06-05 NOTE — ASSESSMENT & PLAN NOTE
I reviewed the patient's pelvic ultrasound with her.  Her ultrasound findings are all reassuring.  Her clinical history suggests anovulation given her very irregular cycling.  We discussed that even if patients who have anovulatory cycling can ovulate but may ovulate very irregularly.  Recommend checking a cycle day 21 progesterone today.  Regardless of that result I would recommend repeating 1 next month as well.  This can help establish a baseline of whether she is mostly ovulatory or mostly anovulatory.  If we are seeing consistent anovulation I would recommend ovulation induction.  If we are seeing consistent ovulation I think the next step would then be a semen analysis.  Continue to monitor menses for now.  If the patient has any suspicion for pregnancy she should take a pregnancy test and notify me ASAP of the result.  I have also recommended a daily prenatal vitamin if not preventing pregnancy.

## 2025-06-05 NOTE — PATIENT INSTRUCTIONS
1.  Please return to clinic at your next scheduled visit.  Please contact the clinic ([phone]) at least 24 hours prior in the event you need to cancel.  2.  Do no harm to yourself or others.    3.  Avoid alcohol and drugs.    4.  Take all medications as prescribed.  Please contact the clinic with any concerns. If you are in need of medication refills, please call the clinic at [phone].    5. Should you want to get in touch with your provider, KECIA Dawson, please contact the office ([phone]), and staff will be able to page Anali directly.  6. In the event you have personal crisis, contact the following crisis numbers: Suicide Prevention Hotline 1-854.853.2427; [facility] Helpline 6-948-780-CCWB; [facility] Emergency Room [phone]; text HELLO to 142343; or 781.     SPECIFIC RECOMMENDATIONS:     1.      Medications discussed at this encounter:     No orders of the defined types were placed in this encounter.                      2.      Psychotherapy recommendations: We will continue therapy at future visits.     3.     Return to clinic: Return in about 2 months (around 8/5/2025).

## 2025-06-09 LAB — MIS SERPL-MCNC: 9.06 NG/ML

## 2025-06-25 ENCOUNTER — CLINICAL SUPPORT (OUTPATIENT)
Dept: OBSTETRICS AND GYNECOLOGY | Age: 30
End: 2025-06-25
Payer: COMMERCIAL

## 2025-06-25 DIAGNOSIS — N93.9 ABNORMAL UTERINE BLEEDING (AUB): ICD-10-CM

## 2025-06-25 LAB — PROGEST SERPL-MCNC: 0.17 NG/ML

## 2025-06-25 PROCEDURE — 84144 ASSAY OF PROGESTERONE: CPT | Performed by: OBSTETRICS & GYNECOLOGY

## 2025-06-25 NOTE — PROGRESS NOTES
Venipuncture Blood Specimen Collection  Venipuncture performed in left arm by Nevaeh Yang with good hemostasis. Patient tolerated the procedure well without complications.   06/25/25   Nevaeh Yang

## 2025-06-27 ENCOUNTER — TELEPHONE (OUTPATIENT)
Dept: OBSTETRICS AND GYNECOLOGY | Age: 30
End: 2025-06-27
Payer: COMMERCIAL

## 2025-07-02 ENCOUNTER — LAB (OUTPATIENT)
Dept: LAB | Facility: HOSPITAL | Age: 30
End: 2025-07-02
Payer: COMMERCIAL

## 2025-07-02 DIAGNOSIS — N93.9 ABNORMAL UTERINE BLEEDING (AUB): ICD-10-CM

## 2025-07-02 LAB — PROGEST SERPL-MCNC: 0.14 NG/ML

## 2025-07-02 PROCEDURE — 84144 ASSAY OF PROGESTERONE: CPT

## 2025-07-28 ENCOUNTER — TELEPHONE (OUTPATIENT)
Dept: BEHAVIORAL HEALTH | Facility: CLINIC | Age: 30
End: 2025-07-28
Payer: COMMERCIAL

## 2025-07-28 DIAGNOSIS — F41.9 ANXIETY: ICD-10-CM

## 2025-07-28 DIAGNOSIS — F33.1 MODERATE EPISODE OF RECURRENT MAJOR DEPRESSIVE DISORDER: ICD-10-CM

## 2025-07-28 RX ORDER — ESCITALOPRAM OXALATE 5 MG/1
5 TABLET ORAL DAILY
Qty: 30 TABLET | Refills: 2 | Status: SHIPPED | OUTPATIENT
Start: 2025-07-28

## 2025-07-28 NOTE — TELEPHONE ENCOUNTER
Medication (escitalopram) sent to patient's preferred pharmacy in record.   normal/clear to auscultation bilaterally/no wheezes/no rales/no rhonchi

## 2025-08-01 ENCOUNTER — LAB (OUTPATIENT)
Dept: LAB | Facility: HOSPITAL | Age: 30
End: 2025-08-01
Payer: COMMERCIAL

## 2025-08-01 DIAGNOSIS — N93.9 ABNORMAL UTERINE BLEEDING (AUB): ICD-10-CM

## 2025-08-01 LAB — PROGEST SERPL-MCNC: 8.48 NG/ML

## 2025-08-01 PROCEDURE — 84144 ASSAY OF PROGESTERONE: CPT

## 2025-08-07 ENCOUNTER — OFFICE VISIT (OUTPATIENT)
Dept: BEHAVIORAL HEALTH | Facility: CLINIC | Age: 30
End: 2025-08-07
Payer: COMMERCIAL

## 2025-08-07 VITALS
HEIGHT: 66 IN | HEART RATE: 91 BPM | DIASTOLIC BLOOD PRESSURE: 99 MMHG | SYSTOLIC BLOOD PRESSURE: 131 MMHG | WEIGHT: 230 LBS | BODY MASS INDEX: 36.96 KG/M2

## 2025-08-07 DIAGNOSIS — F33.1 MODERATE EPISODE OF RECURRENT MAJOR DEPRESSIVE DISORDER: Primary | ICD-10-CM

## 2025-08-07 DIAGNOSIS — F41.1 GENERALIZED ANXIETY DISORDER: ICD-10-CM

## 2025-08-07 DIAGNOSIS — Z63.6 CAREGIVER STRESS: ICD-10-CM

## 2025-08-07 RX ORDER — VILAZODONE HYDROCHLORIDE 10 MG/1
10 TABLET ORAL DAILY
Qty: 30 TABLET | Refills: 1 | Status: SHIPPED | OUTPATIENT
Start: 2025-08-07

## 2025-08-07 SDOH — SOCIAL STABILITY - SOCIAL INSECURITY: DEPENDENT RELATIVE NEEDING CARE AT HOME: Z63.6

## 2025-08-08 ENCOUNTER — TELEPHONE (OUTPATIENT)
Dept: OBSTETRICS AND GYNECOLOGY | Age: 30
End: 2025-08-08
Payer: COMMERCIAL

## 2025-08-08 DIAGNOSIS — N93.9 ABNORMAL UTERINE BLEEDING (AUB): Primary | ICD-10-CM

## 2025-08-13 ENCOUNTER — OFFICE VISIT (OUTPATIENT)
Dept: FAMILY MEDICINE CLINIC | Facility: CLINIC | Age: 30
End: 2025-08-13
Payer: COMMERCIAL

## 2025-08-13 VITALS
BODY MASS INDEX: 37.99 KG/M2 | DIASTOLIC BLOOD PRESSURE: 67 MMHG | WEIGHT: 228 LBS | TEMPERATURE: 98.1 F | HEIGHT: 65 IN | HEART RATE: 78 BPM | SYSTOLIC BLOOD PRESSURE: 108 MMHG | OXYGEN SATURATION: 100 %

## 2025-08-13 DIAGNOSIS — E66.812 CLASS 2 OBESITY WITHOUT SERIOUS COMORBIDITY WITH BODY MASS INDEX (BMI) OF 37.0 TO 37.9 IN ADULT, UNSPECIFIED OBESITY TYPE: ICD-10-CM

## 2025-08-13 DIAGNOSIS — Z51.81 MEDICATION MONITORING ENCOUNTER: Primary | ICD-10-CM

## 2025-08-13 LAB
AMPHET+METHAMPHET UR QL: NEGATIVE
AMPHETAMINE INTERNAL CONTROL: ABNORMAL
AMPHETAMINES UR QL: NEGATIVE
BARBITURATE INTERNAL CONTROL: ABNORMAL
BARBITURATES UR QL SCN: NEGATIVE
BENZODIAZ UR QL SCN: NEGATIVE
BENZODIAZEPINE INTERNAL CONTROL: ABNORMAL
BUPRENORPHINE INTERNAL CONTROL: ABNORMAL
BUPRENORPHINE SERPL-MCNC: NEGATIVE NG/ML
CANNABINOIDS SERPL QL: NEGATIVE
COCAINE INTERNAL CONTROL: ABNORMAL
COCAINE UR QL: NEGATIVE
EXPIRATION DATE: ABNORMAL
Lab: ABNORMAL
MDMA (ECSTASY) INTERNAL CONTROL: ABNORMAL
MDMA UR QL SCN: NEGATIVE
METHADONE INTERNAL CONTROL: ABNORMAL
METHADONE UR QL SCN: NEGATIVE
METHAMPHETAMINE INTERNAL CONTROL: ABNORMAL
MORPHINE INTERNAL CONTROL: ABNORMAL
MORPHINE/OPIATES SCREEN, URINE: POSITIVE
OXYCODONE INTERNAL CONTROL: ABNORMAL
OXYCODONE UR QL SCN: NEGATIVE
PCP UR QL SCN: NEGATIVE
PHENCYCLIDINE INTERNAL CONTROL: ABNORMAL
THC INTERNAL CONTROL: ABNORMAL

## 2025-08-13 PROCEDURE — 80307 DRUG TEST PRSMV CHEM ANLYZR: CPT

## 2025-08-13 RX ORDER — PRENATAL VIT NO.126/IRON/FOLIC 28MG-0.8MG
1 TABLET ORAL DAILY
COMMUNITY

## 2025-08-13 RX ORDER — PHENTERMINE HYDROCHLORIDE 37.5 MG/1
37.5 CAPSULE ORAL EVERY MORNING
Qty: 30 CAPSULE | Refills: 0 | Status: SHIPPED | OUTPATIENT
Start: 2025-08-13

## 2025-08-22 LAB — OPIATES UR QL SCN: NEGATIVE NG/ML

## 2025-08-29 ENCOUNTER — LAB (OUTPATIENT)
Dept: LAB | Facility: HOSPITAL | Age: 30
End: 2025-08-29
Payer: COMMERCIAL

## 2025-08-29 DIAGNOSIS — N93.9 ABNORMAL UTERINE BLEEDING (AUB): ICD-10-CM

## 2025-08-29 LAB — PROGEST SERPL-MCNC: 0.18 NG/ML

## 2025-08-29 PROCEDURE — 84144 ASSAY OF PROGESTERONE: CPT

## (undated) DEVICE — TRY PREP SCRB VAG PVP

## (undated) DEVICE — CYSTO PACK: Brand: MEDLINE INDUSTRIES, INC.

## (undated) DEVICE — Y-TYPE TUR/BLADDER IRRIGATION SET, REGULATING CLAMP

## (undated) DEVICE — Device

## (undated) DEVICE — GW SUREGLIDE FLXTIP STR/TP .035 3X150CM EA/5

## (undated) DEVICE — ENDOSCOPIC VALVE WITH ADAPTER.: Brand: SURSEAL® II

## (undated) DEVICE — BASIC SINGLE BASIN-LF: Brand: MEDLINE INDUSTRIES, INC.

## (undated) DEVICE — SYS IRR PUMP SGL ACTN VAC SYR 10CC

## (undated) DEVICE — NITINOL STONE RETRIEVAL BASKET: Brand: ESCAPE

## (undated) DEVICE — SHEATH ACC URETRL UROPASS 12/14F 24CM EA/5

## (undated) DEVICE — GLV SURG SENSICARE SLT PF LF 6.5 STRL

## (undated) DEVICE — SOL IRR NACL 0.9PCT 3000ML

## (undated) DEVICE — DUAL LUMEN URETERAL CATHETER